# Patient Record
Sex: FEMALE | Race: WHITE | NOT HISPANIC OR LATINO | Employment: UNEMPLOYED | URBAN - METROPOLITAN AREA
[De-identification: names, ages, dates, MRNs, and addresses within clinical notes are randomized per-mention and may not be internally consistent; named-entity substitution may affect disease eponyms.]

---

## 2017-08-29 ENCOUNTER — APPOINTMENT (OUTPATIENT)
Dept: PREADMISSION TESTING | Facility: HOSPITAL | Age: 52
End: 2017-08-29

## 2017-08-29 VITALS — BODY MASS INDEX: 42.75 KG/M2 | WEIGHT: 266 LBS | HEIGHT: 66 IN

## 2017-08-29 LAB
ANION GAP SERPL CALCULATED.3IONS-SCNC: 2 MMOL/L (ref 3–11)
BUN BLD-MCNC: 9 MG/DL (ref 9–23)
BUN/CREAT SERPL: 12.9 (ref 7–25)
CALCIUM SPEC-SCNC: 9 MG/DL (ref 8.7–10.4)
CHLORIDE SERPL-SCNC: 107 MMOL/L (ref 99–109)
CO2 SERPL-SCNC: 33 MMOL/L (ref 20–31)
CREAT BLD-MCNC: 0.7 MG/DL (ref 0.6–1.3)
DEPRECATED RDW RBC AUTO: 48.1 FL (ref 37–54)
ERYTHROCYTE [DISTWIDTH] IN BLOOD BY AUTOMATED COUNT: 15.7 % (ref 11.3–14.5)
GFR SERPL CREATININE-BSD FRML MDRD: 88 ML/MIN/1.73
GLUCOSE BLD-MCNC: 108 MG/DL (ref 70–100)
HCT VFR BLD AUTO: 39.7 % (ref 34.5–44)
HGB BLD-MCNC: 12.6 G/DL (ref 11.5–15.5)
MCH RBC QN AUTO: 26.5 PG (ref 27–31)
MCHC RBC AUTO-ENTMCNC: 31.7 G/DL (ref 32–36)
MCV RBC AUTO: 83.4 FL (ref 80–99)
PLATELET # BLD AUTO: 281 10*3/MM3 (ref 150–450)
PMV BLD AUTO: 9.4 FL (ref 6–12)
POTASSIUM BLD-SCNC: 3.5 MMOL/L (ref 3.5–5.5)
RBC # BLD AUTO: 4.76 10*6/MM3 (ref 3.89–5.14)
SODIUM BLD-SCNC: 142 MMOL/L (ref 132–146)
WBC NRBC COR # BLD: 5.89 10*3/MM3 (ref 3.5–10.8)

## 2017-08-29 PROCEDURE — 93010 ELECTROCARDIOGRAM REPORT: CPT | Performed by: INTERNAL MEDICINE

## 2017-08-29 RX ORDER — SERTRALINE HYDROCHLORIDE 100 MG/1
150 TABLET, FILM COATED ORAL DAILY
COMMUNITY

## 2017-08-29 RX ORDER — ATORVASTATIN CALCIUM 10 MG/1
10 TABLET, FILM COATED ORAL DAILY
Status: ON HOLD | COMMUNITY
End: 2017-11-03 | Stop reason: ALTCHOICE

## 2017-08-29 RX ORDER — CETIRIZINE HYDROCHLORIDE 10 MG/1
5 TABLET ORAL DAILY
COMMUNITY

## 2017-08-29 RX ORDER — OMEPRAZOLE 20 MG/1
20 CAPSULE, DELAYED RELEASE ORAL DAILY
COMMUNITY
End: 2017-11-02

## 2017-08-29 RX ORDER — LEVOTHYROXINE SODIUM 0.12 MG/1
125 TABLET ORAL DAILY
COMMUNITY

## 2017-08-30 ENCOUNTER — ANESTHESIA (OUTPATIENT)
Dept: PERIOP | Facility: HOSPITAL | Age: 52
End: 2017-08-30

## 2017-08-30 ENCOUNTER — HOSPITAL ENCOUNTER (OUTPATIENT)
Facility: HOSPITAL | Age: 52
Discharge: HOME | End: 2017-08-30
Attending: OBSTETRICS & GYNECOLOGY | Admitting: OBSTETRICS & GYNECOLOGY

## 2017-08-30 ENCOUNTER — ANESTHESIA EVENT (OUTPATIENT)
Dept: PERIOP | Facility: HOSPITAL | Age: 52
End: 2017-08-30

## 2017-08-30 VITALS
OXYGEN SATURATION: 92 % | DIASTOLIC BLOOD PRESSURE: 86 MMHG | WEIGHT: 266 LBS | HEART RATE: 74 BPM | SYSTOLIC BLOOD PRESSURE: 126 MMHG | RESPIRATION RATE: 16 BRPM | TEMPERATURE: 98 F | HEIGHT: 66 IN | BODY MASS INDEX: 42.75 KG/M2

## 2017-08-30 DIAGNOSIS — N92.0 MENORRHAGIA: ICD-10-CM

## 2017-08-30 PROBLEM — N93.9 ABNORMAL UTERINE BLEEDING: Status: ACTIVE | Noted: 2017-08-30

## 2017-08-30 LAB
B-HCG UR QL: NEGATIVE
GLUCOSE BLDC GLUCOMTR-MCNC: 84 MG/DL (ref 70–130)
INTERNAL NEGATIVE CONTROL: NEGATIVE
INTERNAL POSITIVE CONTROL: POSITIVE
Lab: NORMAL

## 2017-08-30 PROCEDURE — 25010000003 CEFAZOLIN IN DEXTROSE 2-4 GM/100ML-% SOLUTION: Performed by: OBSTETRICS & GYNECOLOGY

## 2017-08-30 PROCEDURE — 25010000002 NEOSTIGMINE PER 0.5 MG: Performed by: NURSE ANESTHETIST, CERTIFIED REGISTERED

## 2017-08-30 PROCEDURE — 82962 GLUCOSE BLOOD TEST: CPT

## 2017-08-30 PROCEDURE — 25010000002 PROPOFOL 1000 MG/ML EMULSION: Performed by: NURSE ANESTHETIST, CERTIFIED REGISTERED

## 2017-08-30 PROCEDURE — 25010000002 ONDANSETRON PER 1 MG: Performed by: NURSE ANESTHETIST, CERTIFIED REGISTERED

## 2017-08-30 PROCEDURE — 88305 TISSUE EXAM BY PATHOLOGIST: CPT | Performed by: OBSTETRICS & GYNECOLOGY

## 2017-08-30 PROCEDURE — 94799 UNLISTED PULMONARY SVC/PX: CPT

## 2017-08-30 PROCEDURE — C1782 MORCELLATOR: HCPCS | Performed by: OBSTETRICS & GYNECOLOGY

## 2017-08-30 PROCEDURE — 25010000002 DEXAMETHASONE PER 1 MG: Performed by: NURSE ANESTHETIST, CERTIFIED REGISTERED

## 2017-08-30 PROCEDURE — 25010000002 KETOROLAC TROMETHAMINE PER 15 MG: Performed by: NURSE ANESTHETIST, CERTIFIED REGISTERED

## 2017-08-30 PROCEDURE — 25010000002 FENTANYL CITRATE (PF) 100 MCG/2ML SOLUTION: Performed by: NURSE ANESTHETIST, CERTIFIED REGISTERED

## 2017-08-30 PROCEDURE — 25010000002 PROPOFOL 10 MG/ML EMULSION: Performed by: NURSE ANESTHETIST, CERTIFIED REGISTERED

## 2017-08-30 RX ORDER — FAMOTIDINE 20 MG/1
20 TABLET, FILM COATED ORAL ONCE
Status: COMPLETED | OUTPATIENT
Start: 2017-08-30 | End: 2017-08-30

## 2017-08-30 RX ORDER — PROMETHAZINE HYDROCHLORIDE 25 MG/1
25 TABLET ORAL ONCE AS NEEDED
Status: DISCONTINUED | OUTPATIENT
Start: 2017-08-30 | End: 2017-08-30 | Stop reason: HOSPADM

## 2017-08-30 RX ORDER — FENTANYL CITRATE 50 UG/ML
50 INJECTION, SOLUTION INTRAMUSCULAR; INTRAVENOUS
Status: DISCONTINUED | OUTPATIENT
Start: 2017-08-30 | End: 2017-08-30 | Stop reason: HOSPADM

## 2017-08-30 RX ORDER — GLYCOPYRROLATE 0.2 MG/ML
INJECTION INTRAMUSCULAR; INTRAVENOUS AS NEEDED
Status: DISCONTINUED | OUTPATIENT
Start: 2017-08-30 | End: 2017-08-30 | Stop reason: SURG

## 2017-08-30 RX ORDER — ONDANSETRON 2 MG/ML
4 INJECTION INTRAMUSCULAR; INTRAVENOUS ONCE AS NEEDED
Status: DISCONTINUED | OUTPATIENT
Start: 2017-08-30 | End: 2017-08-30 | Stop reason: HOSPADM

## 2017-08-30 RX ORDER — PROMETHAZINE HYDROCHLORIDE 25 MG/ML
6.25 INJECTION, SOLUTION INTRAMUSCULAR; INTRAVENOUS ONCE AS NEEDED
Status: DISCONTINUED | OUTPATIENT
Start: 2017-08-30 | End: 2017-08-30 | Stop reason: HOSPADM

## 2017-08-30 RX ORDER — PROMETHAZINE HYDROCHLORIDE 25 MG/1
25 SUPPOSITORY RECTAL ONCE AS NEEDED
Status: DISCONTINUED | OUTPATIENT
Start: 2017-08-30 | End: 2017-08-30 | Stop reason: HOSPADM

## 2017-08-30 RX ORDER — LIDOCAINE HYDROCHLORIDE 40 MG/ML
SOLUTION TOPICAL AS NEEDED
Status: DISCONTINUED | OUTPATIENT
Start: 2017-08-30 | End: 2017-08-30 | Stop reason: SURG

## 2017-08-30 RX ORDER — HYDROMORPHONE HYDROCHLORIDE 1 MG/ML
0.5 INJECTION, SOLUTION INTRAMUSCULAR; INTRAVENOUS; SUBCUTANEOUS
Status: DISCONTINUED | OUTPATIENT
Start: 2017-08-30 | End: 2017-08-30 | Stop reason: HOSPADM

## 2017-08-30 RX ORDER — SODIUM CHLORIDE, SODIUM LACTATE, POTASSIUM CHLORIDE, CALCIUM CHLORIDE 600; 310; 30; 20 MG/100ML; MG/100ML; MG/100ML; MG/100ML
9 INJECTION, SOLUTION INTRAVENOUS CONTINUOUS
Status: DISCONTINUED | OUTPATIENT
Start: 2017-08-30 | End: 2017-08-30 | Stop reason: HOSPADM

## 2017-08-30 RX ORDER — FENTANYL CITRATE 50 UG/ML
INJECTION, SOLUTION INTRAMUSCULAR; INTRAVENOUS AS NEEDED
Status: DISCONTINUED | OUTPATIENT
Start: 2017-08-30 | End: 2017-08-30 | Stop reason: SURG

## 2017-08-30 RX ORDER — SCOLOPAMINE TRANSDERMAL SYSTEM 1 MG/1
1 PATCH, EXTENDED RELEASE TRANSDERMAL ONCE
Status: DISCONTINUED | OUTPATIENT
Start: 2017-08-30 | End: 2017-08-30 | Stop reason: HOSPADM

## 2017-08-30 RX ORDER — LIDOCAINE HYDROCHLORIDE 10 MG/ML
0.5 INJECTION, SOLUTION EPIDURAL; INFILTRATION; INTRACAUDAL; PERINEURAL ONCE AS NEEDED
Status: DISCONTINUED | OUTPATIENT
Start: 2017-08-30 | End: 2017-08-30 | Stop reason: HOSPADM

## 2017-08-30 RX ORDER — FAMOTIDINE 10 MG/ML
20 INJECTION, SOLUTION INTRAVENOUS ONCE
Status: DISCONTINUED | OUTPATIENT
Start: 2017-08-30 | End: 2017-08-30

## 2017-08-30 RX ORDER — CEFAZOLIN SODIUM 2 G/100ML
2 INJECTION, SOLUTION INTRAVENOUS ONCE
Status: DISCONTINUED | OUTPATIENT
Start: 2017-08-30 | End: 2017-08-30 | Stop reason: HOSPADM

## 2017-08-30 RX ORDER — HYDROCODONE BITARTRATE AND ACETAMINOPHEN 5; 325 MG/1; MG/1
1 TABLET ORAL ONCE AS NEEDED
Status: DISCONTINUED | OUTPATIENT
Start: 2017-08-30 | End: 2017-08-30 | Stop reason: HOSPADM

## 2017-08-30 RX ORDER — SODIUM CHLORIDE, SODIUM LACTATE, POTASSIUM CHLORIDE, CALCIUM CHLORIDE 600; 310; 30; 20 MG/100ML; MG/100ML; MG/100ML; MG/100ML
9 INJECTION, SOLUTION INTRAVENOUS CONTINUOUS
Status: DISCONTINUED | OUTPATIENT
Start: 2017-08-30 | End: 2017-08-30

## 2017-08-30 RX ORDER — ATRACURIUM BESYLATE 10 MG/ML
INJECTION, SOLUTION INTRAVENOUS AS NEEDED
Status: DISCONTINUED | OUTPATIENT
Start: 2017-08-30 | End: 2017-08-30 | Stop reason: SURG

## 2017-08-30 RX ORDER — SODIUM CHLORIDE 0.9 % (FLUSH) 0.9 %
1-10 SYRINGE (ML) INJECTION AS NEEDED
Status: DISCONTINUED | OUTPATIENT
Start: 2017-08-30 | End: 2017-08-30 | Stop reason: HOSPADM

## 2017-08-30 RX ORDER — LIDOCAINE HYDROCHLORIDE 10 MG/ML
0.2 INJECTION, SOLUTION INFILTRATION; PERINEURAL ONCE
Status: COMPLETED | OUTPATIENT
Start: 2017-08-30 | End: 2017-08-30

## 2017-08-30 RX ORDER — CEFAZOLIN SODIUM 2 G/100ML
2 INJECTION, SOLUTION INTRAVENOUS ONCE
Status: COMPLETED | OUTPATIENT
Start: 2017-08-30 | End: 2017-08-30

## 2017-08-30 RX ORDER — DEXAMETHASONE SODIUM PHOSPHATE 4 MG/ML
INJECTION, SOLUTION INTRA-ARTICULAR; INTRALESIONAL; INTRAMUSCULAR; INTRAVENOUS; SOFT TISSUE AS NEEDED
Status: DISCONTINUED | OUTPATIENT
Start: 2017-08-30 | End: 2017-08-30 | Stop reason: SURG

## 2017-08-30 RX ORDER — FAMOTIDINE 20 MG/1
20 TABLET, FILM COATED ORAL ONCE
Status: DISCONTINUED | OUTPATIENT
Start: 2017-08-30 | End: 2017-08-30

## 2017-08-30 RX ORDER — ONDANSETRON 2 MG/ML
INJECTION INTRAMUSCULAR; INTRAVENOUS AS NEEDED
Status: DISCONTINUED | OUTPATIENT
Start: 2017-08-30 | End: 2017-08-30 | Stop reason: SURG

## 2017-08-30 RX ORDER — LIDOCAINE HYDROCHLORIDE 10 MG/ML
INJECTION, SOLUTION INFILTRATION; PERINEURAL AS NEEDED
Status: DISCONTINUED | OUTPATIENT
Start: 2017-08-30 | End: 2017-08-30 | Stop reason: SURG

## 2017-08-30 RX ORDER — PROPOFOL 10 MG/ML
VIAL (ML) INTRAVENOUS AS NEEDED
Status: DISCONTINUED | OUTPATIENT
Start: 2017-08-30 | End: 2017-08-30 | Stop reason: SURG

## 2017-08-30 RX ORDER — KETOROLAC TROMETHAMINE 30 MG/ML
INJECTION, SOLUTION INTRAMUSCULAR; INTRAVENOUS AS NEEDED
Status: DISCONTINUED | OUTPATIENT
Start: 2017-08-30 | End: 2017-08-30 | Stop reason: SURG

## 2017-08-30 RX ADMIN — ROBINUL 0.4 MG: 0.2 INJECTION INTRAMUSCULAR; INTRAVENOUS at 10:50

## 2017-08-30 RX ADMIN — DEXAMETHASONE SODIUM PHOSPHATE 8 MG: 4 INJECTION, SOLUTION INTRAMUSCULAR; INTRAVENOUS at 10:30

## 2017-08-30 RX ADMIN — SODIUM CHLORIDE, POTASSIUM CHLORIDE, SODIUM LACTATE AND CALCIUM CHLORIDE 9 ML/HR: 600; 310; 30; 20 INJECTION, SOLUTION INTRAVENOUS at 08:55

## 2017-08-30 RX ADMIN — ATRACURIUM BESYLATE 30 MG: 10 INJECTION, SOLUTION INTRAVENOUS at 10:23

## 2017-08-30 RX ADMIN — ONDANSETRON 4 MG: 2 INJECTION INTRAMUSCULAR; INTRAVENOUS at 10:30

## 2017-08-30 RX ADMIN — Medication 2.5 MG: at 10:50

## 2017-08-30 RX ADMIN — PROPOFOL 200 MG: 10 INJECTION, EMULSION INTRAVENOUS at 10:22

## 2017-08-30 RX ADMIN — FAMOTIDINE 20 MG: 20 TABLET ORAL at 09:13

## 2017-08-30 RX ADMIN — LIDOCAINE HYDROCHLORIDE 50 MG: 10 INJECTION, SOLUTION INFILTRATION; PERINEURAL at 10:22

## 2017-08-30 RX ADMIN — KETOROLAC TROMETHAMINE 15 MG: 30 INJECTION, SOLUTION INTRAMUSCULAR at 10:47

## 2017-08-30 RX ADMIN — SCOPALAMINE 1 PATCH: 1 PATCH, EXTENDED RELEASE TRANSDERMAL at 09:00

## 2017-08-30 RX ADMIN — LIDOCAINE HYDROCHLORIDE 1 EACH: 40 SOLUTION TOPICAL at 10:25

## 2017-08-30 RX ADMIN — FENTANYL CITRATE 50 MCG: 50 INJECTION, SOLUTION INTRAMUSCULAR; INTRAVENOUS at 10:22

## 2017-08-30 RX ADMIN — CEFAZOLIN SODIUM 2 G: 2 INJECTION, SOLUTION INTRAVENOUS at 10:16

## 2017-08-30 RX ADMIN — LIDOCAINE HYDROCHLORIDE 0.2 ML: 10 INJECTION, SOLUTION EPIDURAL; INFILTRATION; INTRACAUDAL; PERINEURAL at 08:55

## 2017-08-30 RX ADMIN — FENTANYL CITRATE 50 MCG: 50 INJECTION, SOLUTION INTRAMUSCULAR; INTRAVENOUS at 10:20

## 2017-08-30 RX ADMIN — PROPOFOL 25 MCG/KG/MIN: 10 INJECTION, EMULSION INTRAVENOUS at 10:30

## 2017-08-31 LAB
CYTO UR: NORMAL
LAB AP CASE REPORT: NORMAL
LAB AP CLINICAL INFORMATION: NORMAL
Lab: NORMAL
PATH REPORT.FINAL DX SPEC: NORMAL
PATH REPORT.GROSS SPEC: NORMAL

## 2017-09-26 ENCOUNTER — PREP FOR SURGERY (OUTPATIENT)
Dept: GYNECOLOGIC ONCOLOGY | Facility: CLINIC | Age: 52
End: 2017-09-26

## 2017-09-26 ENCOUNTER — OFFICE VISIT (OUTPATIENT)
Dept: GYNECOLOGIC ONCOLOGY | Facility: CLINIC | Age: 52
End: 2017-09-26

## 2017-09-26 VITALS
HEIGHT: 66 IN | WEIGHT: 260 LBS | SYSTOLIC BLOOD PRESSURE: 142 MMHG | OXYGEN SATURATION: 98 % | TEMPERATURE: 98 F | HEART RATE: 85 BPM | BODY MASS INDEX: 41.78 KG/M2 | DIASTOLIC BLOOD PRESSURE: 83 MMHG | RESPIRATION RATE: 14 BRPM

## 2017-09-26 DIAGNOSIS — N85.02 COMPLEX ATYPICAL ENDOMETRIAL HYPERPLASIA: Primary | ICD-10-CM

## 2017-09-26 PROCEDURE — 99244 OFF/OP CNSLTJ NEW/EST MOD 40: CPT | Performed by: OBSTETRICS & GYNECOLOGY

## 2017-09-28 RX ORDER — CELECOXIB 100 MG/1
200 CAPSULE ORAL ONCE
Status: CANCELLED | OUTPATIENT
Start: 2017-09-28 | End: 2017-09-28

## 2017-09-28 RX ORDER — ACETAMINOPHEN 325 MG/1
650 TABLET ORAL ONCE
Status: CANCELLED | OUTPATIENT
Start: 2017-09-28 | End: 2017-09-28

## 2017-09-28 RX ORDER — PREGABALIN 25 MG/1
150 CAPSULE ORAL ONCE
Status: CANCELLED | OUTPATIENT
Start: 2017-09-28 | End: 2017-09-28

## 2017-09-28 RX ORDER — SODIUM CHLORIDE, SODIUM LACTATE, POTASSIUM CHLORIDE, CALCIUM CHLORIDE 600; 310; 30; 20 MG/100ML; MG/100ML; MG/100ML; MG/100ML
100 INJECTION, SOLUTION INTRAVENOUS CONTINUOUS
Status: CANCELLED | OUTPATIENT
Start: 2017-09-28

## 2017-10-02 ENCOUNTER — TELEPHONE (OUTPATIENT)
Dept: GYNECOLOGIC ONCOLOGY | Facility: CLINIC | Age: 52
End: 2017-10-02

## 2017-10-02 NOTE — TELEPHONE ENCOUNTER
Phoned pt to schedule date for surgery with Dr. Faulkner, left message to call.  Returned pt's call from her returning mine and leaving a message.  Left message to call.

## 2017-10-03 ENCOUNTER — PREP FOR SURGERY (OUTPATIENT)
Dept: GYNECOLOGIC ONCOLOGY | Facility: CLINIC | Age: 52
End: 2017-10-03

## 2017-10-03 NOTE — PATIENT INSTRUCTIONS
Gynecologic Oncology  Inpatient Pre-op Patient Education  *See checked boxes for your instructions*    Patient Name:  Maria Dolores Sandoval  [id number]  1965    Surgeon:  Dr. Faulkner    Appointment  [x]  1. Your surgery has been scheduled on 11-3-17. You will need to be at the second floor surgery registration of the [facility] hospital on that day at 6:00 AM.   [x] 2.  You have a pre-admission testing (PAT) appointment for labs and possibly chest xray and EKG, on 11-2-17 at 12:30 PM.  You will need to be at hospital registration on the first floor, 10 minutes before that time.     [x] 3.  The hospital registration department is located in the long hallway between the [address] buildings.     The Day(s) Before Surgery  [x] 1. On 11-2-17, the day prior to surgery, eat lightly.  No solid food after midnight on 11-2-17, including NO MILK, CREAM, OR ORANGE JUICE.  You may have sips of clear fluids up until two-three hours prior to your arrival to the hospital on the morning of surgery.     [] 2.  You may need to use a stool softener, the day prior to surgery to help with existing constipation and to clean out your bowels.  You can purchase Miralax over-the-counter at the pharmacy and follow the directions on the back.  (Do not do this step unless the box is checked).   [x] 3.  Do not take vitamins or full dose aspirin one week before surgery.  If you normally take a blood thinning medication such as Warfarin, Eliquis, or Xarelto, we will give you specific instructions regarding these medications and we may need to talk with your other doctors.   [x] 4.  On the morning of your surgery, you may likely take your routine prescription medications with a sip of water as reviewed with you by your surgeon.  Bring your home medications with you to the hospital as we may need to reference these.  In particular be sure to bring any inhalers.     Post-surgery Instructions  [x] 1.  The length of stay for your type of surgery is typically  one hospital night, however it is also possible that you could be discharged home in the evening on the same day depending on the nature of your surgery.  All rooms are private, so family member may stay with you.     [x] 2.  Do not take your own home prescription medication while you are in the hospital unless otherwise instructed.  These will be provided to you.         Comments:

## 2017-11-02 ENCOUNTER — HOSPITAL ENCOUNTER (OUTPATIENT)
Dept: GENERAL RADIOLOGY | Facility: HOSPITAL | Age: 52
Discharge: HOME | End: 2017-11-02
Admitting: OBSTETRICS & GYNECOLOGY

## 2017-11-02 ENCOUNTER — APPOINTMENT (OUTPATIENT)
Dept: PREADMISSION TESTING | Facility: HOSPITAL | Age: 52
End: 2017-11-02

## 2017-11-02 ENCOUNTER — ANESTHESIA EVENT (OUTPATIENT)
Dept: PERIOP | Facility: HOSPITAL | Age: 52
End: 2017-11-02

## 2017-11-02 VITALS — WEIGHT: 263.23 LBS | BODY MASS INDEX: 42.3 KG/M2 | HEIGHT: 66 IN

## 2017-11-02 DIAGNOSIS — N85.02 COMPLEX ATYPICAL ENDOMETRIAL HYPERPLASIA: ICD-10-CM

## 2017-11-02 LAB
ABO GROUP BLD: NORMAL
ALBUMIN SERPL-MCNC: 4.1 G/DL (ref 3.2–4.8)
ALBUMIN/GLOB SERPL: 1.6 G/DL (ref 1.5–2.5)
ALP SERPL-CCNC: 108 U/L (ref 25–100)
ALT SERPL W P-5'-P-CCNC: 119 U/L (ref 7–40)
ANION GAP SERPL CALCULATED.3IONS-SCNC: 9 MMOL/L (ref 3–11)
AST SERPL-CCNC: 84 U/L (ref 0–33)
BASOPHILS # BLD AUTO: 0.04 10*3/MM3 (ref 0–0.2)
BASOPHILS NFR BLD AUTO: 0.5 % (ref 0–1)
BILIRUB SERPL-MCNC: 0.4 MG/DL (ref 0.3–1.2)
BLD GP AB SCN SERPL QL: NEGATIVE
BUN BLD-MCNC: 9 MG/DL (ref 9–23)
BUN/CREAT SERPL: 11.3 (ref 7–25)
CALCIUM SPEC-SCNC: 8.7 MG/DL (ref 8.7–10.4)
CHLORIDE SERPL-SCNC: 105 MMOL/L (ref 99–109)
CO2 SERPL-SCNC: 26 MMOL/L (ref 20–31)
CREAT BLD-MCNC: 0.8 MG/DL (ref 0.6–1.3)
DEPRECATED RDW RBC AUTO: 47.4 FL (ref 37–54)
EOSINOPHIL # BLD AUTO: 0.21 10*3/MM3 (ref 0–0.3)
EOSINOPHIL NFR BLD AUTO: 2.8 % (ref 0–3)
ERYTHROCYTE [DISTWIDTH] IN BLOOD BY AUTOMATED COUNT: 15.8 % (ref 11.3–14.5)
GFR SERPL CREATININE-BSD FRML MDRD: 75 ML/MIN/1.73
GLOBULIN UR ELPH-MCNC: 2.5 GM/DL
GLUCOSE BLD-MCNC: 107 MG/DL (ref 70–100)
HBA1C MFR BLD: 6 % (ref 4.8–5.6)
HCG INTACT+B SERPL-ACNC: <5 MIU/ML
HCT VFR BLD AUTO: 40.1 % (ref 34.5–44)
HGB BLD-MCNC: 12.7 G/DL (ref 11.5–15.5)
IMM GRANULOCYTES # BLD: 0.02 10*3/MM3 (ref 0–0.03)
IMM GRANULOCYTES NFR BLD: 0.3 % (ref 0–0.6)
LYMPHOCYTES # BLD AUTO: 1.63 10*3/MM3 (ref 0.6–4.8)
LYMPHOCYTES NFR BLD AUTO: 22.1 % (ref 24–44)
MCH RBC QN AUTO: 25.9 PG (ref 27–31)
MCHC RBC AUTO-ENTMCNC: 31.7 G/DL (ref 32–36)
MCV RBC AUTO: 81.7 FL (ref 80–99)
MONOCYTES # BLD AUTO: 0.74 10*3/MM3 (ref 0–1)
MONOCYTES NFR BLD AUTO: 10 % (ref 0–12)
NEUTROPHILS # BLD AUTO: 4.74 10*3/MM3 (ref 1.5–8.3)
NEUTROPHILS NFR BLD AUTO: 64.3 % (ref 41–71)
PLATELET # BLD AUTO: 311 10*3/MM3 (ref 150–450)
PMV BLD AUTO: 9.7 FL (ref 6–12)
POTASSIUM BLD-SCNC: 3.7 MMOL/L (ref 3.5–5.5)
PROT SERPL-MCNC: 6.6 G/DL (ref 5.7–8.2)
RBC # BLD AUTO: 4.91 10*6/MM3 (ref 3.89–5.14)
RH BLD: POSITIVE
SODIUM BLD-SCNC: 140 MMOL/L (ref 132–146)
WBC NRBC COR # BLD: 7.38 10*3/MM3 (ref 3.5–10.8)

## 2017-11-02 NOTE — DISCHARGE INSTRUCTIONS
The following information and instructions were given:    NPO after MN except sips of water with routine prescribed medication (except blood thinner, diabetes, or weight reducing medication) unless otherwise instructed by your physician.  Do not eat, drink, smoke or chew gum after MN the night before surgery. This also includes no mints.    DO NOT shave, wear makeup or dark nail polish.    Remove all jewelry (advised to go to jeweler if unable to remove).    Leave anything you consider valuable at home.    Leave your suitcase in the car until after your surgery.    Bring the following with you (if applicable)   -picture ID and insurance cards   -Co-pay/deductible required by insurance   -Medications in the original bottles (not a list) including all over-the-counter  medications if not brought to PAT   -Copy of advance directive, living will or power of  documents if not  brought to PAT   -CPAP or BIPAP mask and tubing (do not bring machine)   -Skin prep instructions sheet   -PAT Pass   Education booklet, brochure, handout or binder given to patient.    Pain Control After Surgery handout given to patient.    Respirex use (handout given to patient) and pneumonia prevention.    Signs and Symptoms of infection.    DVT Prevention stressing the importance of ambulation.    Patient to apply Chlorhexadine wipes to surgical area (as instructed) the night before procedure and the AM of procedure.      Hysterectomy book given

## 2017-11-03 ENCOUNTER — ANESTHESIA (OUTPATIENT)
Dept: PERIOP | Facility: HOSPITAL | Age: 52
End: 2017-11-03

## 2017-11-03 ENCOUNTER — HOSPITAL ENCOUNTER (OUTPATIENT)
Facility: HOSPITAL | Age: 52
Setting detail: OBSERVATION
Discharge: HOME | End: 2017-11-04
Attending: OBSTETRICS & GYNECOLOGY | Admitting: OBSTETRICS & GYNECOLOGY

## 2017-11-03 DIAGNOSIS — N85.02 COMPLEX ATYPICAL ENDOMETRIAL HYPERPLASIA: ICD-10-CM

## 2017-11-03 LAB
ABO GROUP BLD: NORMAL
B-HCG UR QL: NEGATIVE
GLUCOSE BLDC GLUCOMTR-MCNC: 140 MG/DL (ref 70–130)
GLUCOSE BLDC GLUCOMTR-MCNC: 95 MG/DL (ref 70–130)
INTERNAL NEGATIVE CONTROL: NORMAL
INTERNAL POSITIVE CONTROL: REACTIVE
Lab: NORMAL
RH BLD: POSITIVE

## 2017-11-03 PROCEDURE — 88331 PATH CONSLTJ SURG 1 BLK 1SPC: CPT | Performed by: OBSTETRICS & GYNECOLOGY

## 2017-11-03 PROCEDURE — G0378 HOSPITAL OBSERVATION PER HR: HCPCS

## 2017-11-03 PROCEDURE — 25010000002 NEOSTIGMINE 10 MG/10ML SOLUTION: Performed by: NURSE ANESTHETIST, CERTIFIED REGISTERED

## 2017-11-03 PROCEDURE — 25010000002 ONDANSETRON PER 1 MG: Performed by: NURSE ANESTHETIST, CERTIFIED REGISTERED

## 2017-11-03 PROCEDURE — 82962 GLUCOSE BLOOD TEST: CPT

## 2017-11-03 PROCEDURE — 25010000002 PROPOFOL 10 MG/ML EMULSION: Performed by: NURSE ANESTHETIST, CERTIFIED REGISTERED

## 2017-11-03 PROCEDURE — 88307 TISSUE EXAM BY PATHOLOGIST: CPT | Performed by: OBSTETRICS & GYNECOLOGY

## 2017-11-03 PROCEDURE — 25010000002 FENTANYL CITRATE (PF) 100 MCG/2ML SOLUTION: Performed by: NURSE ANESTHETIST, CERTIFIED REGISTERED

## 2017-11-03 PROCEDURE — 25010000002 PROPOFOL 1000 MG/ML EMULSION: Performed by: NURSE ANESTHETIST, CERTIFIED REGISTERED

## 2017-11-03 PROCEDURE — 25010000002 DEXAMETHASONE PER 1 MG: Performed by: NURSE ANESTHETIST, CERTIFIED REGISTERED

## 2017-11-03 PROCEDURE — 86901 BLOOD TYPING SEROLOGIC RH(D): CPT

## 2017-11-03 PROCEDURE — 25010000002 CEFOXITIN PER 1 G: Performed by: OBSTETRICS & GYNECOLOGY

## 2017-11-03 PROCEDURE — 94799 UNLISTED PULMONARY SVC/PX: CPT

## 2017-11-03 PROCEDURE — 58571 TLH W/T/O 250 G OR LESS: CPT | Performed by: OBSTETRICS & GYNECOLOGY

## 2017-11-03 PROCEDURE — 86900 BLOOD TYPING SEROLOGIC ABO: CPT

## 2017-11-03 PROCEDURE — 94660 CPAP INITIATION&MGMT: CPT

## 2017-11-03 RX ORDER — DOCUSATE SODIUM 100 MG/1
100 CAPSULE, LIQUID FILLED ORAL 2 TIMES DAILY PRN
Status: DISCONTINUED | OUTPATIENT
Start: 2017-11-03 | End: 2017-11-04 | Stop reason: HOSPADM

## 2017-11-03 RX ORDER — PROMETHAZINE HYDROCHLORIDE 12.5 MG/1
12.5 TABLET ORAL EVERY 6 HOURS PRN
Status: DISCONTINUED | OUTPATIENT
Start: 2017-11-03 | End: 2017-11-04 | Stop reason: HOSPADM

## 2017-11-03 RX ORDER — FAMOTIDINE 10 MG/ML
20 INJECTION, SOLUTION INTRAVENOUS ONCE
Status: DISCONTINUED | OUTPATIENT
Start: 2017-11-03 | End: 2017-11-03 | Stop reason: HOSPADM

## 2017-11-03 RX ORDER — ONDANSETRON 4 MG/1
4 TABLET, FILM COATED ORAL EVERY 6 HOURS PRN
Status: DISCONTINUED | OUTPATIENT
Start: 2017-11-03 | End: 2017-11-04 | Stop reason: HOSPADM

## 2017-11-03 RX ORDER — SCOLOPAMINE TRANSDERMAL SYSTEM 1 MG/1
PATCH, EXTENDED RELEASE TRANSDERMAL AS NEEDED
Status: DISCONTINUED | OUTPATIENT
Start: 2017-11-03 | End: 2017-11-03 | Stop reason: SURG

## 2017-11-03 RX ORDER — LEVOTHYROXINE SODIUM 0.12 MG/1
125 TABLET ORAL
Status: DISCONTINUED | OUTPATIENT
Start: 2017-11-04 | End: 2017-11-04 | Stop reason: HOSPADM

## 2017-11-03 RX ORDER — PROMETHAZINE HYDROCHLORIDE 12.5 MG/1
12.5 SUPPOSITORY RECTAL EVERY 6 HOURS PRN
Status: DISCONTINUED | OUTPATIENT
Start: 2017-11-03 | End: 2017-11-04 | Stop reason: HOSPADM

## 2017-11-03 RX ORDER — ACETAMINOPHEN 325 MG/1
650 TABLET ORAL ONCE
Status: DISCONTINUED | OUTPATIENT
Start: 2017-11-03 | End: 2017-11-03 | Stop reason: HOSPADM

## 2017-11-03 RX ORDER — LIDOCAINE HYDROCHLORIDE 10 MG/ML
0.5 INJECTION, SOLUTION EPIDURAL; INFILTRATION; INTRACAUDAL; PERINEURAL ONCE AS NEEDED
Status: COMPLETED | OUTPATIENT
Start: 2017-11-03 | End: 2017-11-03

## 2017-11-03 RX ORDER — CALCIUM CARBONATE 200(500)MG
1 TABLET,CHEWABLE ORAL 3 TIMES DAILY PRN
Status: DISCONTINUED | OUTPATIENT
Start: 2017-11-03 | End: 2017-11-04 | Stop reason: HOSPADM

## 2017-11-03 RX ORDER — PROMETHAZINE HYDROCHLORIDE 25 MG/ML
12.5 INJECTION, SOLUTION INTRAMUSCULAR; INTRAVENOUS EVERY 6 HOURS PRN
Status: DISCONTINUED | OUTPATIENT
Start: 2017-11-03 | End: 2017-11-04 | Stop reason: HOSPADM

## 2017-11-03 RX ORDER — DEXAMETHASONE SODIUM PHOSPHATE 4 MG/ML
INJECTION, SOLUTION INTRA-ARTICULAR; INTRALESIONAL; INTRAMUSCULAR; INTRAVENOUS; SOFT TISSUE AS NEEDED
Status: DISCONTINUED | OUTPATIENT
Start: 2017-11-03 | End: 2017-11-03 | Stop reason: SURG

## 2017-11-03 RX ORDER — ONDANSETRON 2 MG/ML
INJECTION INTRAMUSCULAR; INTRAVENOUS AS NEEDED
Status: DISCONTINUED | OUTPATIENT
Start: 2017-11-03 | End: 2017-11-03 | Stop reason: SURG

## 2017-11-03 RX ORDER — SODIUM CHLORIDE, SODIUM LACTATE, POTASSIUM CHLORIDE, CALCIUM CHLORIDE 600; 310; 30; 20 MG/100ML; MG/100ML; MG/100ML; MG/100ML
100 INJECTION, SOLUTION INTRAVENOUS CONTINUOUS
Status: DISCONTINUED | OUTPATIENT
Start: 2017-11-03 | End: 2017-11-03

## 2017-11-03 RX ORDER — OXYCODONE HYDROCHLORIDE 5 MG/1
10 TABLET ORAL EVERY 4 HOURS PRN
Status: DISCONTINUED | OUTPATIENT
Start: 2017-11-03 | End: 2017-11-04 | Stop reason: HOSPADM

## 2017-11-03 RX ORDER — FAMOTIDINE 20 MG/1
20 TABLET, FILM COATED ORAL 2 TIMES DAILY
Status: DISCONTINUED | OUTPATIENT
Start: 2017-11-03 | End: 2017-11-04 | Stop reason: HOSPADM

## 2017-11-03 RX ORDER — PROPOFOL 10 MG/ML
VIAL (ML) INTRAVENOUS AS NEEDED
Status: DISCONTINUED | OUTPATIENT
Start: 2017-11-03 | End: 2017-11-03 | Stop reason: SURG

## 2017-11-03 RX ORDER — LIDOCAINE HYDROCHLORIDE 10 MG/ML
INJECTION, SOLUTION INFILTRATION; PERINEURAL AS NEEDED
Status: DISCONTINUED | OUTPATIENT
Start: 2017-11-03 | End: 2017-11-03 | Stop reason: SURG

## 2017-11-03 RX ORDER — FAMOTIDINE 20 MG/1
20 TABLET, FILM COATED ORAL ONCE
Status: COMPLETED | OUTPATIENT
Start: 2017-11-03 | End: 2017-11-03

## 2017-11-03 RX ORDER — SODIUM CHLORIDE, SODIUM LACTATE, POTASSIUM CHLORIDE, CALCIUM CHLORIDE 600; 310; 30; 20 MG/100ML; MG/100ML; MG/100ML; MG/100ML
9 INJECTION, SOLUTION INTRAVENOUS CONTINUOUS
Status: DISCONTINUED | OUTPATIENT
Start: 2017-11-03 | End: 2017-11-03

## 2017-11-03 RX ORDER — PROMETHAZINE HYDROCHLORIDE 25 MG/ML
6.25 INJECTION, SOLUTION INTRAMUSCULAR; INTRAVENOUS ONCE AS NEEDED
Status: DISCONTINUED | OUTPATIENT
Start: 2017-11-03 | End: 2017-11-03 | Stop reason: HOSPADM

## 2017-11-03 RX ORDER — PROMETHAZINE HYDROCHLORIDE 25 MG/1
25 TABLET ORAL ONCE AS NEEDED
Status: DISCONTINUED | OUTPATIENT
Start: 2017-11-03 | End: 2017-11-03 | Stop reason: HOSPADM

## 2017-11-03 RX ORDER — BUPIVACAINE HYDROCHLORIDE AND EPINEPHRINE 5; 5 MG/ML; UG/ML
INJECTION, SOLUTION PERINEURAL AS NEEDED
Status: DISCONTINUED | OUTPATIENT
Start: 2017-11-03 | End: 2017-11-03 | Stop reason: HOSPADM

## 2017-11-03 RX ORDER — FENTANYL CITRATE 50 UG/ML
50 INJECTION, SOLUTION INTRAMUSCULAR; INTRAVENOUS
Status: DISCONTINUED | OUTPATIENT
Start: 2017-11-03 | End: 2017-11-03 | Stop reason: HOSPADM

## 2017-11-03 RX ORDER — CETIRIZINE HYDROCHLORIDE 10 MG/1
5 TABLET ORAL DAILY
Status: DISCONTINUED | OUTPATIENT
Start: 2017-11-04 | End: 2017-11-04 | Stop reason: HOSPADM

## 2017-11-03 RX ORDER — GLYCOPYRROLATE 0.2 MG/ML
INJECTION INTRAMUSCULAR; INTRAVENOUS AS NEEDED
Status: DISCONTINUED | OUTPATIENT
Start: 2017-11-03 | End: 2017-11-03 | Stop reason: SURG

## 2017-11-03 RX ORDER — SODIUM CHLORIDE 9 MG/ML
INJECTION, SOLUTION INTRAVENOUS AS NEEDED
Status: DISCONTINUED | OUTPATIENT
Start: 2017-11-03 | End: 2017-11-03 | Stop reason: HOSPADM

## 2017-11-03 RX ORDER — NEOSTIGMINE METHYLSULFATE 1 MG/ML
INJECTION, SOLUTION INTRAVENOUS AS NEEDED
Status: DISCONTINUED | OUTPATIENT
Start: 2017-11-03 | End: 2017-11-03 | Stop reason: SURG

## 2017-11-03 RX ORDER — ONDANSETRON 2 MG/ML
4 INJECTION INTRAMUSCULAR; INTRAVENOUS EVERY 6 HOURS PRN
Status: DISCONTINUED | OUTPATIENT
Start: 2017-11-03 | End: 2017-11-04 | Stop reason: HOSPADM

## 2017-11-03 RX ORDER — MAGNESIUM HYDROXIDE 1200 MG/15ML
LIQUID ORAL AS NEEDED
Status: DISCONTINUED | OUTPATIENT
Start: 2017-11-03 | End: 2017-11-03 | Stop reason: HOSPADM

## 2017-11-03 RX ORDER — HYDROMORPHONE HYDROCHLORIDE 1 MG/ML
0.5 INJECTION, SOLUTION INTRAMUSCULAR; INTRAVENOUS; SUBCUTANEOUS
Status: DISCONTINUED | OUTPATIENT
Start: 2017-11-03 | End: 2017-11-03 | Stop reason: HOSPADM

## 2017-11-03 RX ORDER — SODIUM CHLORIDE, SODIUM LACTATE, POTASSIUM CHLORIDE, CALCIUM CHLORIDE 600; 310; 30; 20 MG/100ML; MG/100ML; MG/100ML; MG/100ML
75 INJECTION, SOLUTION INTRAVENOUS CONTINUOUS
Status: DISCONTINUED | OUTPATIENT
Start: 2017-11-03 | End: 2017-11-04 | Stop reason: HOSPADM

## 2017-11-03 RX ORDER — PROMETHAZINE HYDROCHLORIDE 25 MG/1
25 SUPPOSITORY RECTAL ONCE AS NEEDED
Status: DISCONTINUED | OUTPATIENT
Start: 2017-11-03 | End: 2017-11-03 | Stop reason: HOSPADM

## 2017-11-03 RX ORDER — SODIUM CHLORIDE 0.9 % (FLUSH) 0.9 %
1-10 SYRINGE (ML) INJECTION AS NEEDED
Status: DISCONTINUED | OUTPATIENT
Start: 2017-11-03 | End: 2017-11-03 | Stop reason: HOSPADM

## 2017-11-03 RX ORDER — FENTANYL CITRATE 50 UG/ML
INJECTION, SOLUTION INTRAMUSCULAR; INTRAVENOUS AS NEEDED
Status: DISCONTINUED | OUTPATIENT
Start: 2017-11-03 | End: 2017-11-03 | Stop reason: SURG

## 2017-11-03 RX ORDER — SIMETHICONE 80 MG
80 TABLET,CHEWABLE ORAL 4 TIMES DAILY PRN
Status: DISCONTINUED | OUTPATIENT
Start: 2017-11-03 | End: 2017-11-04 | Stop reason: HOSPADM

## 2017-11-03 RX ORDER — IBUPROFEN 400 MG/1
400 TABLET ORAL EVERY 6 HOURS PRN
Status: DISCONTINUED | OUTPATIENT
Start: 2017-11-03 | End: 2017-11-04 | Stop reason: HOSPADM

## 2017-11-03 RX ORDER — CELECOXIB 200 MG/1
200 CAPSULE ORAL ONCE
Status: COMPLETED | OUTPATIENT
Start: 2017-11-03 | End: 2017-11-03

## 2017-11-03 RX ORDER — PREGABALIN 75 MG/1
150 CAPSULE ORAL ONCE
Status: COMPLETED | OUTPATIENT
Start: 2017-11-03 | End: 2017-11-03

## 2017-11-03 RX ORDER — OXYCODONE HYDROCHLORIDE 5 MG/1
5 TABLET ORAL EVERY 4 HOURS PRN
Status: DISCONTINUED | OUTPATIENT
Start: 2017-11-03 | End: 2017-11-04 | Stop reason: HOSPADM

## 2017-11-03 RX ADMIN — PREGABALIN 150 MG: 75 CAPSULE ORAL at 06:39

## 2017-11-03 RX ADMIN — WATER 2 G: 1 INJECTION INTRAMUSCULAR; INTRAVENOUS; SUBCUTANEOUS at 08:06

## 2017-11-03 RX ADMIN — EPHEDRINE SULFATE 10 MG: 50 INJECTION INTRAMUSCULAR; INTRAVENOUS; SUBCUTANEOUS at 08:18

## 2017-11-03 RX ADMIN — DEXAMETHASONE SODIUM PHOSPHATE 8 MG: 4 INJECTION, SOLUTION INTRAMUSCULAR; INTRAVENOUS at 08:26

## 2017-11-03 RX ADMIN — GLYCOPYRROLATE 0.4 MG: 0.2 INJECTION, SOLUTION INTRAMUSCULAR; INTRAVENOUS at 10:09

## 2017-11-03 RX ADMIN — SODIUM CHLORIDE, POTASSIUM CHLORIDE, SODIUM LACTATE AND CALCIUM CHLORIDE 75 ML/HR: 600; 310; 30; 20 INJECTION, SOLUTION INTRAVENOUS at 11:41

## 2017-11-03 RX ADMIN — ONDANSETRON 4 MG: 2 INJECTION INTRAMUSCULAR; INTRAVENOUS at 10:09

## 2017-11-03 RX ADMIN — LIDOCAINE HYDROCHLORIDE 50 MG: 10 INJECTION, SOLUTION INFILTRATION; PERINEURAL at 08:06

## 2017-11-03 RX ADMIN — SODIUM CHLORIDE, POTASSIUM CHLORIDE, SODIUM LACTATE AND CALCIUM CHLORIDE: 600; 310; 30; 20 INJECTION, SOLUTION INTRAVENOUS at 10:03

## 2017-11-03 RX ADMIN — SCOPALAMINE 1 PATCH: 1 PATCH, EXTENDED RELEASE TRANSDERMAL at 07:41

## 2017-11-03 RX ADMIN — PROPOFOL 200 MG: 10 INJECTION, EMULSION INTRAVENOUS at 08:06

## 2017-11-03 RX ADMIN — CELECOXIB 200 MG: 200 CAPSULE ORAL at 06:44

## 2017-11-03 RX ADMIN — SODIUM CHLORIDE, POTASSIUM CHLORIDE, SODIUM LACTATE AND CALCIUM CHLORIDE 9 ML/HR: 600; 310; 30; 20 INJECTION, SOLUTION INTRAVENOUS at 06:39

## 2017-11-03 RX ADMIN — IBUPROFEN 400 MG: 400 TABLET ORAL at 16:45

## 2017-11-03 RX ADMIN — NEOSTIGMINE METHYLSULFATE 3 MG: 1 INJECTION, SOLUTION INTRAVENOUS at 10:09

## 2017-11-03 RX ADMIN — PROPOFOL 25 MCG/KG/MIN: 10 INJECTION, EMULSION INTRAVENOUS at 08:21

## 2017-11-03 RX ADMIN — SODIUM CHLORIDE, POTASSIUM CHLORIDE, SODIUM LACTATE AND CALCIUM CHLORIDE: 600; 310; 30; 20 INJECTION, SOLUTION INTRAVENOUS at 08:00

## 2017-11-03 RX ADMIN — FAMOTIDINE 20 MG: 20 TABLET, FILM COATED ORAL at 16:45

## 2017-11-03 RX ADMIN — SODIUM CHLORIDE, POTASSIUM CHLORIDE, SODIUM LACTATE AND CALCIUM CHLORIDE 75 ML/HR: 600; 310; 30; 20 INJECTION, SOLUTION INTRAVENOUS at 20:00

## 2017-11-03 RX ADMIN — FENTANYL CITRATE 100 MCG: 50 INJECTION, SOLUTION INTRAMUSCULAR; INTRAVENOUS at 08:06

## 2017-11-03 RX ADMIN — LIDOCAINE HYDROCHLORIDE 0.5 ML: 10 INJECTION, SOLUTION EPIDURAL; INFILTRATION; INTRACAUDAL; PERINEURAL at 06:39

## 2017-11-03 RX ADMIN — FAMOTIDINE 20 MG: 20 TABLET, FILM COATED ORAL at 06:39

## 2017-11-03 NOTE — ANESTHESIA PREPROCEDURE EVALUATION
Anesthesia Evaluation     Patient summary reviewed and Nursing notes reviewed          Airway   Mallampati: I  TM distance: >3 FB  Neck ROM: full  no difficulty expected  Dental      Pulmonary    (+) sleep apnea,   Cardiovascular - negative cardio ROS    ECG reviewed        Neuro/Psych  (+) headaches, psychiatric history Depression,    GI/Hepatic/Renal/Endo    (+) obesity, morbid obesity, hiatal hernia, GERD, liver disease, diabetes mellitus,     Musculoskeletal     (+) back pain,   Abdominal    Substance History - negative use     OB/GYN negative ob/gyn ROS         Other                                        Anesthesia Plan    ASA 3     general     intravenous induction     Plan discussed with CRNA.

## 2017-11-03 NOTE — ANESTHESIA POSTPROCEDURE EVALUATION
Patient: Maria Dolores Sandoval    Procedure Summary     Date Anesthesia Start Anesthesia Stop Room / Location    11/03/17 0802   [facility]       Procedure Diagnosis Surgeon Provider    TOTAL LAPAROSCOPIC HYSTERECTOMY, BILATERAL SALPINGO OOPHORECTOMY WITH DAVINCI ROBOT (N/A Abdomen) Complex atypical endometrial hyperplasia  (Complex atypical endometrial hyperplasia [N85.02]) MD Isaac Lopez MD          Anesthesia Type: general  Last vitals  BP   137/86 (11/03/17 0633)   Temp   98.4 °F (36.9 °C) (11/03/17 0633)   Pulse       Resp         SpO2         Post Anesthesia Care and Evaluation    Patient location during evaluation: PACU  Patient participation: complete - patient participated  Level of consciousness: awake and alert  Pain score: 0  Pain management: adequate  Airway patency: patent  Anesthetic complications: No anesthetic complications  PONV Status: none  Cardiovascular status: hemodynamically stable and acceptable  Respiratory status: nonlabored ventilation, acceptable and nasal cannula  Hydration status: acceptable    Comments: BP 93/60   Temp 97.6 °F (36.9 °C) SpO2 96% Resp 12 HR 69 (Temporal Artery )   [facility] 10/26/2017

## 2017-11-03 NOTE — OP NOTE
Subjective     Date of Service:  11/03/17  Time of Service:  10:17 AM    Surgical Staff: Surgeon(s) and Role:     * Ni Faulkner MD - Primary   Additional Staff: Jamie GRIFFITHS,  resident   Pre-operative diagnosis(es): Pre-Op Diagnosis Codes:     * Complex atypical endometrial hyperplasia [N85.02]  BMI = 42.49     Post-operative diagnosis(es): Post-Op Diagnosis Codes:   same     Procedure(s): Procedure(s):  TOTAL LAPAROSCOPIC HYSTERECTOMY, BILATERAL SALPINGO OOPHORECTOMY WITH DAVINCI ROBOT     Antibiotics: cefoxitin (Mefoxin) ordered on call to OR     Anesthesia: Type: General  ASA:  III     Objective      Operative findings: At laparoscopy, uterus was enlarged for age.  Adnexa were unremarkable.  Limited visualization of the abdominal cavity was unremarkable.  On frozen section, no cancer was identified.     Specimens removed:   ID Type Source Tests Collected by Time Destination   A :  Tissue Uterus with Cervix, Bilateral Tubes and Ovaries TISSUE EXAM Ni Faulkner MD 11/3/2017 0914       Fluid Intake:    Output:     I/O this shift:  In: 1000 [I.V.:1000]  Out: 300 [Urine:300]     Blood products used: No   Drains: Urethral Catheter 11/03/17 0826 100% silicone 16 (Active)          Implant Information: Nothing was implanted during the procedure   Complications: No immediate   Intraoperative consult(s):    Condition: stable   Disposition: to PACU and then admit to  medical - surgical floor       Indications:    Patient is a 52 year old woman with CAH.  Risks and benefits of procedure were discussed.  Consent was signed and on chart.  Plan was made for 23 hour observation due to patient's comorbidities including morbid obesity and obstructive sleep apnea.    Procedures:    Procedure:   After obtaining informed consent, the patient was  taken to the operating room and underwent general endotracheal anesthesia after  patient and site verification. The feet were placed in Julio César stirrups. The arms were tucked at the  sides. Steep Trendelenburg  positioning was tested and found to be adequate. The abdomen, perineum, and  vagina were prepped and draped in the usual sterile fashion. Umanzor catheter was  anchored. Retractors were used to visualize the cervix. Cervix was sounded and the appropriate uterine manipulator was called for.  Uterine manipulator was secured with out difficulty.  Attention was turned to the abdomen. Prior  to each incision, skin was injected with 0.5% Marcaine with epinephrine. A 12  mm trocar was inserted at the umbilical midline position in the open  technique without difficulty. Laparoscope was introduced to confirm  positioning. Steep Trendelenburg was called for. The abdomen was insufflated to  a pressure of 15 mmHg with CO2 gas.  2 left-sided 8 mm and 2 right-sided 8 mm trochars were all placed under direct visualization.  The above findings were noted.  [employer] robot was docked to the patient and surgeon retired to the operating console.    The peritoneum overlying the pelvic sidewalls was divided with monopolar scissors.  Infundibulopelvic ligaments were isolated from surrounding structures and  pedicles were created using bipolar cautery and scissors. Likewise, the round  ligaments were divided. Anterior and posterior leaves of the broad ligament  were divided with monopolar scissors. A bladder  flap was developed.  Uterine vessels were isolated. Pedicles were created at the level of the uterine vessels using bipolar cautery and scissors. Cardinal ligament and uterosacral ligament complexes were divided in a similar fashion. Monopolar scissors were used to perform a circumferential colpotomy and the specimen was handed off intact via the vagina for frozen section with the above noted results.     The vaginal cuff was closed with 0 Vicryl suture in a running locking fashion x2 layers.Good hemostasis was noted. Additional hemostasis  was achieved at the pelvis as needed using bipolar cautery. [employer]  robot  was undocked from the patient and the surgeon returned to the bedside.  0 Vicryl suture at the umbilical midline fascial incision. At that point, no fascial defects could be visualized.  The skin was closed with 3-0  Monocryl in subcuticular stitch and glue was placed at the skin. The vagina was  Inspected.  Occluder and all instruments had been removed from the vagina. The patient was taken to the recovery room in good condition. There were no immediate complications. All counts were correct.            Ni Faulkner MD  11/03/17  10:17 AM

## 2017-11-03 NOTE — H&P
Patient Care Team:      Chief complaint: abnormal uterine bleeding    Subjective:  Patient is a 52 y.o.female presents with long history of abnormal uterine bleeding. She denies any recent changes in presenting symptoms.  She underwent D&C  In August.  Previous biopsy has revealed atypical hyperplasia. She presents today for hysterectomy.  She denies any recent fever or chills.    Review of Systems:  General ROS: negative  Cardiovascular ROS: no chest pain or dyspnea on exertion  Respiratory ROS: positive for - shortness of breath...chronic.  Denies cough      Allergies:   Allergies   Allergen Reactions   • Codeine GI Intolerance     vomiting          Latex: no  Contrast Dye: no    Home Meds    Prescriptions Prior to Admission   Medication Sig Dispense Refill Last Dose   • cetirizine (zyrTEC) 10 MG tablet Take 5 mg by mouth Daily.   11/3/2017 at 0530   • levothyroxine (SYNTHROID, LEVOTHROID) 125 MCG tablet Take 125 mcg by mouth Daily.   11/3/2017 at 0530   • sertraline (ZOLOFT) 100 MG tablet Take 150 mg by mouth Daily.   11/3/2017 at 0530     PMH:   Past Medical History:   Diagnosis Date   • Arthritis    • Back pain    • Depression    • Diabetes mellitus     no meds currently; checks blood sugars at home occasioannly   • Diarrhea following gastrointestinal surgery     since cholecystectomy    • Disease of thyroid gland     hypothyroidism    • Fatty liver    • Fatty liver    • GERD (gastroesophageal reflux disease)    • Headache    • Hiatal hernia    • History of anemia     due to heavy periods    • History of depression    • History of migraine     early adulthood after a concussion-none recently    • Insomnia    • Polycystic disease, ovaries    • Presenile cataract    • Sleep apnea with use of continuous positive airway pressure (CPAP)     auto range of 6-16   • Wears glasses      PSH:    Past Surgical History:   Procedure Laterality Date   • CHOLECYSTECTOMY  2014   • COLONOSCOPY  2017    polyps remoed    • D&C  HYSTEROSCOPY ENDOMETRIAL ABLATION N/A 8/30/2017    Procedure: HYSTEROSCOPY DILATATION AND CURETTAGE, ENDOMETRIAL ABLATION, POSSIBLE REMOVAL OF LEIOMYOMATA;  Surgeon: Veronica Riley MD;  [facility];  Service:    • D&C HYSTEROSCOPY MYOSURE N/A 8/30/2017    Procedure: DILATATION AND CURETTAGE HYSTEROSCOPY WITH MYOSURE;  Surgeon: Veronica Riley MD;  Location:  [facility];  Service:    • DIAGNOSTIC LAPAROSCOPY  2007    removed uterine septum    • HERNIA REPAIR  2014    no mesh used   • VAGINA SURGERY      removal of polyps   • WISDOM TOOTH EXTRACTION       Immunization History: pneumo: 2016   Flu: 2017  Tetanus: 2017  Social History:   Tobacco: no   Alcohol: no      Physical Exam:/86 (BP Location: Right arm, Patient Position: Lying)  Temp 98.4 °F (36.9 °C) (Temporal Artery )   LMP 10/26/2017      General Appearance:    Alert, cooperative, no distress, appears stated age   Head:    Normocephalic, without obvious abnormality, atraumatic   Lungs:     Clear to auscultation bilaterally, respirations unlabored    Heart: Regular rate and rhythm, S1 and S2 normal, no murmur, rub    or gallop    Abdomen:    Soft without tenderness, obese   Breast Exam:    deferred   Genitalia:    deferred   Extremities:   Extremities normal, atraumatic, no cyanosis or edema   Skin:   Skin color, texture, turgor normal, no rashes or lesions   Neurologic:   Grossly intact     Results Review: Chem profile( elevated ALT,AST), CBC, EKG and CXR on chart.     Impression: Atypical hyperplasia, Mennorrhagia    Plan: For Laparoscopic hysterectomy, Bilateral Salpingo/Oopherectomy, possible lymph node dissection today.  ELISA Acosta 11/3/2017 6:51 AM          I saw and evaluated the patient. I agree with the findings and the plan of care as documented in the note.    If cancer, clinical stage I ([id number])    Ni Faulkner MD  11/03/17  7:13 AM

## 2017-11-03 NOTE — ANESTHESIA PROCEDURE NOTES
Airway  Urgency: elective    Airway not difficult    General Information and Staff    Patient location during procedure: OR  CRNA: SHANIA MARY    Indications and Patient Condition  Indications for airway management: airway protection    Preoxygenated: yes  MILS not maintained throughout  Mask difficulty assessment: 2 - vent by mask + OA or adjuvant +/- NMBA    Final Airway Details  Final airway type: endotracheal airway      Successful airway: ETT  Cuffed: yes   Successful intubation technique: direct laryngoscopy  Facilitating devices/methods: cricoid pressure  Endotracheal tube insertion site: oral  Blade: Erika  Blade size: #3  ETT size: 7.5 mm  Cormack-Lehane Classification: grade IIa - partial view of glottis  Placement verified by: chest auscultation and capnometry   Measured from: lips  ETT to lips (cm): 20  Number of attempts at approach: 1    Additional Comments  Negative epigastric sounds, Breath sound equal bilaterally with symmetric chest rise and fall, lips and teeth as preop, atraumatic

## 2017-11-03 NOTE — PLAN OF CARE
Problem: Perioperative Period (Adult)  Goal: Signs and Symptoms of Listed Potential Problems Will be Absent or Manageable (Perioperative Period)    11/03/17 1109   Perioperative Period   Problems Assessed (Perioperative Period) all   Problems Present (Perioperative Period) none

## 2017-11-04 VITALS
WEIGHT: 263 LBS | OXYGEN SATURATION: 92 % | TEMPERATURE: 98.5 F | DIASTOLIC BLOOD PRESSURE: 59 MMHG | SYSTOLIC BLOOD PRESSURE: 112 MMHG | RESPIRATION RATE: 18 BRPM | HEART RATE: 80 BPM | BODY MASS INDEX: 42.27 KG/M2 | HEIGHT: 66 IN

## 2017-11-04 PROBLEM — F32.A DEPRESSION: Status: ACTIVE | Noted: 2017-11-04

## 2017-11-04 PROBLEM — Z91.09 ENVIRONMENTAL ALLERGIES: Status: ACTIVE | Noted: 2017-11-04

## 2017-11-04 PROBLEM — G47.33 OSA (OBSTRUCTIVE SLEEP APNEA): Status: ACTIVE | Noted: 2017-11-04

## 2017-11-04 PROBLEM — E03.9 ACQUIRED HYPOTHYROIDISM: Status: ACTIVE | Noted: 2017-11-04

## 2017-11-04 LAB
ANION GAP SERPL CALCULATED.3IONS-SCNC: 8 MMOL/L (ref 3–11)
BASOPHILS # BLD AUTO: 0.02 10*3/MM3 (ref 0–0.2)
BASOPHILS NFR BLD AUTO: 0.2 % (ref 0–1)
BUN BLD-MCNC: 10 MG/DL (ref 9–23)
BUN/CREAT SERPL: 12.5 (ref 7–25)
CALCIUM SPEC-SCNC: 8.7 MG/DL (ref 8.7–10.4)
CHLORIDE SERPL-SCNC: 104 MMOL/L (ref 99–109)
CO2 SERPL-SCNC: 26 MMOL/L (ref 20–31)
CREAT BLD-MCNC: 0.8 MG/DL (ref 0.6–1.3)
DEPRECATED RDW RBC AUTO: 49.1 FL (ref 37–54)
EOSINOPHIL # BLD AUTO: 0.12 10*3/MM3 (ref 0–0.3)
EOSINOPHIL NFR BLD AUTO: 1 % (ref 0–3)
ERYTHROCYTE [DISTWIDTH] IN BLOOD BY AUTOMATED COUNT: 16.2 % (ref 11.3–14.5)
GFR SERPL CREATININE-BSD FRML MDRD: 75 ML/MIN/1.73
GLUCOSE BLD-MCNC: 113 MG/DL (ref 70–100)
HCT VFR BLD AUTO: 36.8 % (ref 34.5–44)
HGB BLD-MCNC: 11.1 G/DL (ref 11.5–15.5)
IMM GRANULOCYTES # BLD: 0.04 10*3/MM3 (ref 0–0.03)
IMM GRANULOCYTES NFR BLD: 0.3 % (ref 0–0.6)
LYMPHOCYTES # BLD AUTO: 1.24 10*3/MM3 (ref 0.6–4.8)
LYMPHOCYTES NFR BLD AUTO: 9.9 % (ref 24–44)
MCH RBC QN AUTO: 24.9 PG (ref 27–31)
MCHC RBC AUTO-ENTMCNC: 30.2 G/DL (ref 32–36)
MCV RBC AUTO: 82.5 FL (ref 80–99)
MONOCYTES # BLD AUTO: 0.9 10*3/MM3 (ref 0–1)
MONOCYTES NFR BLD AUTO: 7.2 % (ref 0–12)
NEUTROPHILS # BLD AUTO: 10.25 10*3/MM3 (ref 1.5–8.3)
NEUTROPHILS NFR BLD AUTO: 81.4 % (ref 41–71)
PLATELET # BLD AUTO: 294 10*3/MM3 (ref 150–450)
PMV BLD AUTO: 9.6 FL (ref 6–12)
POTASSIUM BLD-SCNC: 3.3 MMOL/L (ref 3.5–5.5)
RBC # BLD AUTO: 4.46 10*6/MM3 (ref 3.89–5.14)
SODIUM BLD-SCNC: 138 MMOL/L (ref 132–146)
WBC NRBC COR # BLD: 12.57 10*3/MM3 (ref 3.5–10.8)

## 2017-11-04 PROCEDURE — 94799 UNLISTED PULMONARY SVC/PX: CPT

## 2017-11-04 PROCEDURE — 94660 CPAP INITIATION&MGMT: CPT

## 2017-11-04 PROCEDURE — 85025 COMPLETE CBC W/AUTO DIFF WBC: CPT | Performed by: OBSTETRICS & GYNECOLOGY

## 2017-11-04 PROCEDURE — 80048 BASIC METABOLIC PNL TOTAL CA: CPT | Performed by: OBSTETRICS & GYNECOLOGY

## 2017-11-04 PROCEDURE — G0378 HOSPITAL OBSERVATION PER HR: HCPCS

## 2017-11-04 PROCEDURE — 25010000002 ENOXAPARIN PER 10 MG: Performed by: OBSTETRICS & GYNECOLOGY

## 2017-11-04 RX ORDER — DOCUSATE SODIUM 250 MG
250 CAPSULE ORAL 2 TIMES DAILY PRN
Qty: 60 CAPSULE | Refills: 3 | Status: SHIPPED | OUTPATIENT
Start: 2017-11-04

## 2017-11-04 RX ORDER — POTASSIUM CHLORIDE 750 MG/1
40 CAPSULE, EXTENDED RELEASE ORAL AS NEEDED
Status: DISCONTINUED | OUTPATIENT
Start: 2017-11-04 | End: 2017-11-04 | Stop reason: HOSPADM

## 2017-11-04 RX ORDER — IBUPROFEN 600 MG/1
600 TABLET ORAL EVERY 6 HOURS PRN
Qty: 30 TABLET | Refills: 1 | Status: SHIPPED | OUTPATIENT
Start: 2017-11-04

## 2017-11-04 RX ORDER — POTASSIUM CHLORIDE 750 MG/1
40 CAPSULE, EXTENDED RELEASE ORAL ONCE
Qty: 4 CAPSULE | Refills: 0 | Status: SHIPPED | OUTPATIENT
Start: 2017-11-04 | End: 2017-11-04

## 2017-11-04 RX ORDER — POTASSIUM CHLORIDE 1.5 G/1.77G
40 POWDER, FOR SOLUTION ORAL AS NEEDED
Status: DISCONTINUED | OUTPATIENT
Start: 2017-11-04 | End: 2017-11-04 | Stop reason: HOSPADM

## 2017-11-04 RX ORDER — OXYCODONE HYDROCHLORIDE 5 MG/1
5 TABLET ORAL EVERY 4 HOURS PRN
Qty: 20 TABLET | Refills: 0 | Status: SHIPPED | OUTPATIENT
Start: 2017-11-04 | End: 2017-11-13

## 2017-11-04 RX ADMIN — CETIRIZINE HYDROCHLORIDE 5 MG: 10 TABLET, FILM COATED ORAL at 08:58

## 2017-11-04 RX ADMIN — FAMOTIDINE 20 MG: 20 TABLET, FILM COATED ORAL at 08:58

## 2017-11-04 RX ADMIN — IBUPROFEN 400 MG: 400 TABLET ORAL at 08:58

## 2017-11-04 RX ADMIN — ENOXAPARIN SODIUM 40 MG: 40 INJECTION SUBCUTANEOUS at 08:58

## 2017-11-04 RX ADMIN — LEVOTHYROXINE SODIUM 125 MCG: 125 TABLET ORAL at 05:57

## 2017-11-04 RX ADMIN — OXYCODONE HYDROCHLORIDE 5 MG: 5 TABLET ORAL at 04:18

## 2017-11-04 RX ADMIN — SERTRALINE HYDROCHLORIDE 150 MG: 100 TABLET ORAL at 08:56

## 2017-11-04 RX ADMIN — DOCUSATE SODIUM 100 MG: 100 CAPSULE, LIQUID FILLED ORAL at 08:58

## 2017-11-04 NOTE — PROGRESS NOTES
Gynecologic Oncology   Daily Progress Note    Chief Complaint: post op    Subjective   Patient did well overnight.  Her pain is controlled.  She is not having nausea or emesis.  She is tolerating a regular diet.  She is voiding spontaneously and is passing gas.  She is ambulating.      Objective   Temp:  [97 °F (36.1 °C)-98.5 °F (36.9 °C)] 98.5 °F (36.9 °C)  Heart Rate:  [65-86] 80  Resp:  [12-18] 18  BP: ()/(58-82) 112/59  Vitals:    11/04/17 0906   BP: 112/59   Pulse: 80   Resp: 18   Temp: 98.5 °F (36.9 °C)   SpO2: 92%     I/O last 3 completed shifts:  In: 2097.7 [P.O.:420; I.V.:1677.7]  Out: 2880 [Urine:2850; Blood:30]     GENERAL: Alert, well-appearing female in no apparent distress.    CARDIOVASCULAR: Normal rate, regular rhythm, no murmurs, rubs, or gallops.    RESPIRATORY: Clear to auscultation bilaterally, normal respiratory effort  GASTROINTESTINAL:  Soft, appropriately tender, non-distended, no rebound or guarding.  Positive bowel sounds.  Incision(s) c/d/i.  GENITOURINARY: guzman previously removed.    SKIN:  Warm, dry, well-perfused.    PSYCHIATRIC: AO x3, with appropriate affect, normal thought processes  EXREMITIES: Symmetric. No peripheral edema.    Lab Results   Component Value Date    WBC 12.57 (H) 11/04/2017    HGB 11.1 (L) 11/04/2017    HCT 36.8 11/04/2017    MCV 82.5 11/04/2017     11/04/2017    NEUTROABS 10.25 (H) 11/04/2017    GLUCOSE 113 (H) 11/04/2017    BUN 10 11/04/2017    CREATININE 0.80 11/04/2017    EGFRIFNONA 75 11/04/2017     11/04/2017    K 3.3 (L) 11/04/2017     11/04/2017    CO2 26.0 11/04/2017    CALCIUM 8.7 11/04/2017         Assessment/Plan   Maria Dolores Sandoval is a 52 y.o. female POD1 s/p Robotic assisted total laparoscopic hysterectomy, bilateral salpingo-oophorectomy complex atypical hyperplasia    1.  Post-operative care  -Routine care (encourage ambulation, IS use, advance diet as tolerated, saline lock IV, bowel regimen, VTE prophylaxis)    2.  Medical  problems being addressed this hospital stay  Depression- Zoloft  Hypothyroid - synthroid  Allergies - zyrtec  MELISA- cpap last pm  Fatty liver - no tylenol  Hypokalemia - replacement ordered    3.  Disposition  -d/c home today     OR and precautions reviewed        Ni Faulkner MD  11/04/17  10:18 AM

## 2017-11-04 NOTE — DISCHARGE INSTRUCTIONS
1) No driving for 1-2 weeks and no longer taking narcotics.   2) May shower / sponge bathe >24 hours after surgery, No tub baths/soaking  3) Do not lift / push / pull more then 20 lb. for 6 weeks  4) Pelvic rest for 6 weeks

## 2017-11-04 NOTE — DISCHARGE SUMMARY
Gynecologic Oncology   [facility]   Discharge Summary    Date of Admission: 11/3/2017    Date of Discharge: 11/4/2017    Admission Diagnoses:  Complex atypical hyperplasia  Morbid obesity  Hypothyroidism  Depression  Obstructive sleep apnea  Environmental allergies  Fatty liver      Discharge Diagnoses: Same + hypokalemia      Hospital Course:  Maria Dolores Sandoval is a 52 y.o. female who presented with a diagnosis of complex atypical hyperplasia and plan was made for surgical intervention.      On 11/3/2017 she was admitted and underwent robotic-assisted total laparoscopic hysterectomy, bilateral salpingo-oophorectomy.  Please refer to dictated operative note for further details.  Post-operatively she did well.  Her diet was advanced.  Due to fatty liver patient was not given Tylenol.  Nonsurgical medical illnesses were appropriately managed during her hospital stay.  CPAP was administered overnight.  Her POD1 labs showed stable H/H.  Chemistries were remarkable for hypokalemia.  Replacement was ordered..  By POD1 she was tolerating a general diet, voiding spontaneously, having her pain controlled with oral medications, and otherwise meeting criteria for discharge and she was discharged home in stable condition.    Discharge Medications:   Maria Dolores Sandoval   Home Medication Instructions SHAVONNE:[id number]    Printed on:11/04/17 1030   Medication Information                      cetirizine (zyrTEC) 10 MG tablet  Take 5 mg by mouth Daily.             docusate sodium (COLACE) 250 MG capsule  Take 1 capsule by mouth 2 (Two) Times a Day As Needed for Constipation.             ibuprofen (ADVIL,MOTRIN) 600 MG tablet  Take 1 tablet by mouth Every 6 (Six) Hours As Needed for Mild Pain .             levothyroxine (SYNTHROID, LEVOTHROID) 125 MCG tablet  Take 125 mcg by mouth Daily.             oxyCODONE (ROXICODONE) 5 MG immediate release tablet  Take 1 tablet by mouth Every 4 (Four) Hours As Needed for Severe Pain  for up  to 9 days.             potassium chloride (MICRO-K) 10 MEQ CR capsule  Take 4 capsules by mouth 1 (One) Time for 1 dose. 4 hours after previous dose             sertraline (ZOLOFT) 100 MG tablet  Take 150 mg by mouth Daily.                 Discharge Instructions:  She was instructed to call or return to medical attention for fever, severe pain, persistent nausea and vomiting, questions regarding medications, concerns regarding her incisions, excessive vaginal bleeding or discharge, or for any other acute concerns.  She was also instructed not to drive while taking narcotic pain medications, to abide by pelvic rest, avoid tub baths, and to avoid heavy lifting for at least 6 weeks.  Patient was educated regarding constipation prevention.      Condition at Discharge: Stable    Discharge Destination: Home    Results pending at time of discharge: Final surgical pathology     Follow Up: 3 weeks with Lucie    Electronically Signed by: Ni Faulkner MD  Date: 11/4/2017      Time:  10:30 AM

## 2017-11-06 LAB
CYTO UR: NORMAL
LAB AP CASE REPORT: NORMAL
LAB AP CLINICAL INFORMATION: NORMAL
Lab: NORMAL
Lab: NORMAL
PATH REPORT.FINAL DX SPEC: NORMAL
PATH REPORT.GROSS SPEC: NORMAL

## 2017-11-07 ENCOUNTER — TELEPHONE (OUTPATIENT)
Dept: GYNECOLOGIC ONCOLOGY | Facility: CLINIC | Age: 52
End: 2017-11-07

## 2017-11-07 NOTE — TELEPHONE ENCOUNTER
Phoned pt per Dr. Faulkner's request, informed her of pathology showing no cancer, only hyperplasia, same as before surgery.  Pt v/u, asked her about her post op appt needing to be scheduled, pt says she will call back.  She will call before if needed.

## 2017-11-21 ENCOUNTER — TELEPHONE (OUTPATIENT)
Dept: GYNECOLOGIC ONCOLOGY | Facility: CLINIC | Age: 52
End: 2017-11-21

## 2017-11-21 NOTE — TELEPHONE ENCOUNTER
"Patient had called and stated that she fell. She took the brunt of the fall on her hands and elbow. After that she had some light spotting. She denies pain or saturating a pad. She denies fever. Her bleeding has slowed down. She states she is having drainage from her belly button. It is not hard nor hot to touch with no fever. She notes it seems superficial with a little \"pus\". I told her to clean with normal soap and water, watch for changes and fever. She has a follow up appointment on 11/30/2017. She is to call if any of her symptoms worsen before then.   "

## 2017-11-30 ENCOUNTER — OFFICE VISIT (OUTPATIENT)
Dept: GYNECOLOGIC ONCOLOGY | Facility: CLINIC | Age: 52
End: 2017-11-30

## 2017-11-30 VITALS
WEIGHT: 259 LBS | SYSTOLIC BLOOD PRESSURE: 150 MMHG | TEMPERATURE: 97.7 F | OXYGEN SATURATION: 97 % | DIASTOLIC BLOOD PRESSURE: 81 MMHG | BODY MASS INDEX: 41.8 KG/M2 | RESPIRATION RATE: 16 BRPM | HEART RATE: 84 BPM

## 2017-11-30 DIAGNOSIS — Z98.890 POST-OPERATIVE STATE: Primary | ICD-10-CM

## 2017-11-30 PROCEDURE — 99024 POSTOP FOLLOW-UP VISIT: CPT | Performed by: OBSTETRICS & GYNECOLOGY

## 2017-12-02 PROBLEM — Z98.890 POST-OPERATIVE STATE: Status: ACTIVE | Noted: 2017-12-02

## 2024-07-29 ENCOUNTER — APPOINTMENT (OUTPATIENT)
Dept: CT IMAGING | Facility: HOSPITAL | Age: 59
End: 2024-07-29
Payer: COMMERCIAL

## 2024-07-29 ENCOUNTER — APPOINTMENT (OUTPATIENT)
Dept: GENERAL RADIOLOGY | Facility: HOSPITAL | Age: 59
End: 2024-07-29
Payer: COMMERCIAL

## 2024-07-29 ENCOUNTER — HOSPITAL ENCOUNTER (EMERGENCY)
Facility: HOSPITAL | Age: 59
Discharge: HOME | End: 2024-07-29
Attending: STUDENT IN AN ORGANIZED HEALTH CARE EDUCATION/TRAINING PROGRAM
Payer: COMMERCIAL

## 2024-07-29 VITALS
RESPIRATION RATE: 18 BRPM | BODY MASS INDEX: 34.39 KG/M2 | DIASTOLIC BLOOD PRESSURE: 82 MMHG | HEART RATE: 77 BPM | HEIGHT: 66 IN | WEIGHT: 214 LBS | SYSTOLIC BLOOD PRESSURE: 116 MMHG | TEMPERATURE: 98.2 F | OXYGEN SATURATION: 98 %

## 2024-07-29 DIAGNOSIS — K21.9 GASTROESOPHAGEAL REFLUX DISEASE, UNSPECIFIED WHETHER ESOPHAGITIS PRESENT: ICD-10-CM

## 2024-07-29 DIAGNOSIS — R07.9 CHEST PAIN, UNSPECIFIED TYPE: Primary | ICD-10-CM

## 2024-07-29 DIAGNOSIS — R91.1 LUNG NODULE: ICD-10-CM

## 2024-07-29 LAB
ALBUMIN SERPL-MCNC: 4 G/DL (ref 3.5–5.2)
ALBUMIN/GLOB SERPL: 1.7 G/DL
ALP SERPL-CCNC: 94 U/L (ref 39–117)
ALT SERPL W P-5'-P-CCNC: 22 U/L (ref 1–33)
ANION GAP SERPL CALCULATED.3IONS-SCNC: 13 MMOL/L (ref 5–15)
AST SERPL-CCNC: 23 U/L (ref 1–32)
ATMOSPHERIC PRESS: ABNORMAL MM[HG]
BASE EXCESS BLDV CALC-SCNC: 2.4 MMOL/L (ref -2–2)
BASOPHILS # BLD AUTO: 0.03 10*3/MM3 (ref 0–0.2)
BASOPHILS NFR BLD AUTO: 0.4 % (ref 0–1.5)
BDY SITE: ABNORMAL
BILIRUB SERPL-MCNC: 0.3 MG/DL (ref 0–1.2)
BODY TEMPERATURE: 37
BUN SERPL-MCNC: 13 MG/DL (ref 6–20)
BUN/CREAT SERPL: 13.1 (ref 7–25)
CALCIUM SPEC-SCNC: 10.1 MG/DL (ref 8.6–10.5)
CHLORIDE SERPL-SCNC: 106 MMOL/L (ref 98–107)
CO2 BLDA-SCNC: 28.3 MMOL/L (ref 22–33)
CO2 SERPL-SCNC: 23 MMOL/L (ref 22–29)
COHGB MFR BLD: 1.1 %
CREAT SERPL-MCNC: 0.99 MG/DL (ref 0.57–1)
D DIMER PPP FEU-MCNC: <0.27 MCGFEU/ML (ref 0–0.59)
DEPRECATED RDW RBC AUTO: 44.4 FL (ref 37–54)
EGFRCR SERPLBLD CKD-EPI 2021: 65.8 ML/MIN/1.73
EOSINOPHIL # BLD AUTO: 0.42 10*3/MM3 (ref 0–0.4)
EOSINOPHIL NFR BLD AUTO: 6.2 % (ref 0.3–6.2)
EPAP: 0
ERYTHROCYTE [DISTWIDTH] IN BLOOD BY AUTOMATED COUNT: 13.8 % (ref 12.3–15.4)
GLOBULIN UR ELPH-MCNC: 2.3 GM/DL
GLUCOSE SERPL-MCNC: 169 MG/DL (ref 65–99)
HCO3 BLDV-SCNC: 27 MMOL/L (ref 22–28)
HCT VFR BLD AUTO: 44.4 % (ref 34–46.6)
HGB BLD-MCNC: 14.2 G/DL (ref 12–15.9)
HGB BLDA-MCNC: 14.9 G/DL (ref 14–18)
HOLD SPECIMEN: NORMAL
IMM GRANULOCYTES # BLD AUTO: 0.04 10*3/MM3 (ref 0–0.05)
IMM GRANULOCYTES NFR BLD AUTO: 0.6 % (ref 0–0.5)
INHALED O2 CONCENTRATION: 21 %
IPAP: 0
LIPASE SERPL-CCNC: 41 U/L (ref 13–60)
LYMPHOCYTES # BLD AUTO: 1.55 10*3/MM3 (ref 0.7–3.1)
LYMPHOCYTES NFR BLD AUTO: 22.8 % (ref 19.6–45.3)
MAGNESIUM SERPL-MCNC: 2 MG/DL (ref 1.6–2.6)
MCH RBC QN AUTO: 27.8 PG (ref 26.6–33)
MCHC RBC AUTO-ENTMCNC: 32 G/DL (ref 31.5–35.7)
MCV RBC AUTO: 86.9 FL (ref 79–97)
METHGB BLD QL: 0.1 %
MODALITY: ABNORMAL
MONOCYTES # BLD AUTO: 0.38 10*3/MM3 (ref 0.1–0.9)
MONOCYTES NFR BLD AUTO: 5.6 % (ref 5–12)
NEUTROPHILS NFR BLD AUTO: 4.38 10*3/MM3 (ref 1.7–7)
NEUTROPHILS NFR BLD AUTO: 64.4 % (ref 42.7–76)
NRBC BLD AUTO-RTO: 0 /100 WBC (ref 0–0.2)
NT-PROBNP SERPL-MCNC: <36 PG/ML (ref 0–900)
OXYHGB MFR BLDV: 90.6 %
PAW @ PEAK INSP FLOW SETTING VENT: 0 CMH2O
PCO2 BLDV: 41 MM HG (ref 41–51)
PH BLDV: 7.43 PH UNITS (ref 7.31–7.41)
PLATELET # BLD AUTO: 286 10*3/MM3 (ref 140–450)
PMV BLD AUTO: 9.6 FL (ref 6–12)
PO2 BLDV: 59.4 MM HG (ref 27–53)
POTASSIUM SERPL-SCNC: 4 MMOL/L (ref 3.5–5.2)
PROT SERPL-MCNC: 6.3 G/DL (ref 6–8.5)
RBC # BLD AUTO: 5.11 10*6/MM3 (ref 3.77–5.28)
SODIUM SERPL-SCNC: 142 MMOL/L (ref 136–145)
T4 FREE SERPL-MCNC: 1.11 NG/DL (ref 0.92–1.68)
TOTAL RATE: 0 BREATHS/MINUTE
TROPONIN T SERPL HS-MCNC: 8 NG/L
TSH SERPL DL<=0.05 MIU/L-ACNC: 0.45 UIU/ML (ref 0.27–4.2)
WBC NRBC COR # BLD AUTO: 6.8 10*3/MM3 (ref 3.4–10.8)
WHOLE BLOOD HOLD COAG: NORMAL
WHOLE BLOOD HOLD SPECIMEN: NORMAL

## 2024-07-29 PROCEDURE — 71045 X-RAY EXAM CHEST 1 VIEW: CPT

## 2024-07-29 PROCEDURE — 71260 CT THORAX DX C+: CPT

## 2024-07-29 PROCEDURE — 82805 BLOOD GASES W/O2 SATURATION: CPT

## 2024-07-29 PROCEDURE — 25510000001 IOPAMIDOL 61 % SOLUTION: Performed by: STUDENT IN AN ORGANIZED HEALTH CARE EDUCATION/TRAINING PROGRAM

## 2024-07-29 PROCEDURE — 83880 ASSAY OF NATRIURETIC PEPTIDE: CPT | Performed by: STUDENT IN AN ORGANIZED HEALTH CARE EDUCATION/TRAINING PROGRAM

## 2024-07-29 PROCEDURE — 80050 GENERAL HEALTH PANEL: CPT | Performed by: STUDENT IN AN ORGANIZED HEALTH CARE EDUCATION/TRAINING PROGRAM

## 2024-07-29 PROCEDURE — 85379 FIBRIN DEGRADATION QUANT: CPT | Performed by: STUDENT IN AN ORGANIZED HEALTH CARE EDUCATION/TRAINING PROGRAM

## 2024-07-29 PROCEDURE — 93005 ELECTROCARDIOGRAM TRACING: CPT | Performed by: STUDENT IN AN ORGANIZED HEALTH CARE EDUCATION/TRAINING PROGRAM

## 2024-07-29 PROCEDURE — 83735 ASSAY OF MAGNESIUM: CPT | Performed by: STUDENT IN AN ORGANIZED HEALTH CARE EDUCATION/TRAINING PROGRAM

## 2024-07-29 PROCEDURE — 83690 ASSAY OF LIPASE: CPT | Performed by: STUDENT IN AN ORGANIZED HEALTH CARE EDUCATION/TRAINING PROGRAM

## 2024-07-29 PROCEDURE — 84484 ASSAY OF TROPONIN QUANT: CPT | Performed by: STUDENT IN AN ORGANIZED HEALTH CARE EDUCATION/TRAINING PROGRAM

## 2024-07-29 PROCEDURE — 84439 ASSAY OF FREE THYROXINE: CPT | Performed by: STUDENT IN AN ORGANIZED HEALTH CARE EDUCATION/TRAINING PROGRAM

## 2024-07-29 PROCEDURE — 99285 EMERGENCY DEPT VISIT HI MDM: CPT

## 2024-07-29 RX ORDER — ASPIRIN 81 MG/1
324 TABLET, CHEWABLE ORAL ONCE
Status: COMPLETED | OUTPATIENT
Start: 2024-07-29 | End: 2024-07-29

## 2024-07-29 RX ORDER — SODIUM CHLORIDE 0.9 % (FLUSH) 0.9 %
10 SYRINGE (ML) INJECTION AS NEEDED
Status: DISCONTINUED | OUTPATIENT
Start: 2024-07-29 | End: 2024-07-29 | Stop reason: HOSPADM

## 2024-07-29 RX ADMIN — IOPAMIDOL 80 ML: 612 INJECTION, SOLUTION INTRAVENOUS at 14:56

## 2024-07-29 RX ADMIN — ASPIRIN 324 MG: 81 TABLET, CHEWABLE ORAL at 12:46

## 2024-07-29 NOTE — DISCHARGE INSTRUCTIONS
Follow-up with your primary care doctor discussed both the lung nodule with them as well as whether it would be beneficial to follow-up with a GI doctor and perhaps have an endoscopy as the GERD symptoms seem to be worsening.  You will be contacted with follow-up in the lung nodule clinic and with cardiology follow-up.  While today's workup was reassuring if your symptoms change or worsen please return to the ED or seek other medical care.

## 2024-07-29 NOTE — ED PROVIDER NOTES
EMERGENCY DEPARTMENT ENCOUNTER    Pt Name: Maria Dolores Sandoval  MRN: [id number]  Pt :   1965  Room Number:    Date of encounter:  2024  PCP: Christiano Lamb DO  ED Provider: Seferino Baumann MD    Historian: Patient      HPI:  Chief Complaint: Chest pain        Context: Maria Dolores Sandoval is a 59-year-old woman who presents the emergency department for evaluation of now resolved chest pain.  She has history of obesity, hypertension, prediabetes, hypothyroidism.  She also has history of GERD and thinks that may be what the pain was she was woken at 1:30 AM with severe substernal chest pain as well as diaphoresis.  The pain did not radiate and she did not appreciate to be exertional and did get better with Tums. She has been pain-free since the 30-minute episode of pain but family wanted her to be checked out she currently denies symptoms.  No other complaints at this time       PAST MEDICAL HISTORY  Past Medical History:   Diagnosis Date    Arthritis     Back pain     Depression     Diabetes mellitus     no meds currently; checks blood sugars at home occasioannly    Diarrhea following gastrointestinal surgery     since cholecystectomy     Disease of thyroid gland     hypothyroidism     Fatty liver     Fatty liver     GERD (gastroesophageal reflux disease)     Headache     Hiatal hernia     History of anemia     due to heavy periods     History of depression     History of migraine     early adulthood after a concussion-none recently     Insomnia     Polycystic disease, ovaries     Presenile cataract     Sleep apnea with use of continuous positive airway pressure (CPAP)     auto range of 6-16    Wears glasses          PAST SURGICAL HISTORY  Past Surgical History:   Procedure Laterality Date    CHOLECYSTECTOMY      COLONOSCOPY  2017    polyps remoed     D & C HYSTEROSCOPY ENDOMETRIAL ABLATION N/A 2017    Procedure: HYSTEROSCOPY DILATATION AND CURETTAGE, ENDOMETRIAL ABLATION, POSSIBLE  REMOVAL OF LEIOMYOMATA;  Surgeon: Veronica Riley MD;  Location:  [facility];  Service:     D & C HYSTEROSCOPY MYOSURE N/A 8/30/2017    Procedure: DILATATION AND CURETTAGE HYSTEROSCOPY WITH MYOSURE;  Surgeon: Veronica Riley MD;  Location:  [facility];  Service:     DIAGNOSTIC LAPAROSCOPY  2007    removed uterine septum     HERNIA REPAIR  2014    no mesh used    TOTAL LAPAROSCOPIC HYSTERECTOMY N/A 11/3/2017    Procedure: TOTAL LAPAROSCOPIC HYSTERECTOMY, BILATERAL SALPINGO OOPHORECTOMY WITH DAVINCI ROBOT;  Surgeon: Ni Faulkner MD;  Location:  [facility];  Service:     VAGINA SURGERY      removal of polyps    WISDOM TOOTH EXTRACTION           FAMILY HISTORY  Family History   Problem Relation Age of Onset    Diabetes Mother     Heart disease Mother     Cirrhosis Father     Ovarian cancer Sister 30        half sister    Breast cancer Paternal Aunt 69    Colon cancer Maternal Uncle          SOCIAL HISTORY  Social History     Socioeconomic History    Marital status:     Number of children: 0   Tobacco Use    Smoking status: Never    Smokeless tobacco: Never   Substance and Sexual Activity    Alcohol use: No    Drug use: No    Sexual activity: Defer         ALLERGIES  Codeine        REVIEW OF SYSTEMS  Review of Systems       All systems reviewed and negative except for those discussed in HPI.       PHYSICAL EXAM    I have reviewed the triage vital signs and nursing notes.    ED Triage Vitals [07/29/24 1230]   Temp Heart Rate Resp BP SpO2   98.2 °F (36.8 °C) 97 18 142/95 99 %      Temp src Heart Rate Source Patient Position BP Location FiO2 (%)   Oral Monitor -- Right arm --       Physical Exam  GENERAL:   Appears in no acute distress.   HENT: Nares patent.  EYES: No scleral icterus.  CV: Regular rhythm, regular rate.  RESPIRATORY: Normal effort.  No audible wheezes, rales or rhonchi.  ABDOMEN: Soft, nontender  MUSCULOSKELETAL: No deformities.   NEURO: Alert, moves all extremities, follows  commands.  SKIN: Warm, dry, no rash visualized.      LAB RESULTS  Recent Results (from the past 24 hour(s))   ECG 12 Lead ED Triage Standing Order; Chest Pain    Collection Time: 07/29/24 12:37 PM   Result Value Ref Range    QT Interval 386 ms    QTC Interval 436 ms   High Sensitivity Troponin T    Collection Time: 07/29/24 12:45 PM    Specimen: Blood   Result Value Ref Range    HS Troponin T 8 <14 ng/L   Comprehensive Metabolic Panel    Collection Time: 07/29/24 12:45 PM    Specimen: Blood   Result Value Ref Range    Glucose 169 (H) 65 - 99 mg/dL    BUN 13 6 - 20 mg/dL    Creatinine 0.99 0.57 - 1.00 mg/dL    Sodium 142 136 - 145 mmol/L    Potassium 4.0 3.5 - 5.2 mmol/L    Chloride 106 98 - 107 mmol/L    CO2 23.0 22.0 - 29.0 mmol/L    Calcium 10.1 8.6 - 10.5 mg/dL    Total Protein 6.3 6.0 - 8.5 g/dL    Albumin 4.0 3.5 - 5.2 g/dL    ALT (SGPT) 22 1 - 33 U/L    AST (SGOT) 23 1 - 32 U/L    Alkaline Phosphatase 94 39 - 117 U/L    Total Bilirubin 0.3 0.0 - 1.2 mg/dL    Globulin 2.3 gm/dL    A/G Ratio 1.7 g/dL    BUN/Creatinine Ratio 13.1 7.0 - 25.0    Anion Gap 13.0 5.0 - 15.0 mmol/L    eGFR 65.8 >60.0 mL/min/1.73   Lipase    Collection Time: 07/29/24 12:45 PM    Specimen: Blood   Result Value Ref Range    Lipase 41 13 - 60 U/L   BNP    Collection Time: 07/29/24 12:45 PM    Specimen: Blood   Result Value Ref Range    proBNP <36.0 0.0 - 900.0 pg/mL   Green Top (Gel)    Collection Time: 07/29/24 12:45 PM   Result Value Ref Range    Extra Tube Hold for add-ons.    Lavender Top    Collection Time: 07/29/24 12:45 PM   Result Value Ref Range    Extra Tube hold for add-on    Gold Top - SST    Collection Time: 07/29/24 12:45 PM   Result Value Ref Range    Extra Tube Hold for add-ons.    Gray Top    Collection Time: 07/29/24 12:45 PM   Result Value Ref Range    Extra Tube Hold for add-ons.    Light Blue Top    Collection Time: 07/29/24 12:45 PM   Result Value Ref Range    Extra Tube Hold for add-ons.    CBC Auto Differential     Collection Time: 07/29/24 12:45 PM    Specimen: Blood   Result Value Ref Range    WBC 6.80 3.40 - 10.80 10*3/mm3    RBC 5.11 3.77 - 5.28 10*6/mm3    Hemoglobin 14.2 12.0 - 15.9 g/dL    Hematocrit 44.4 34.0 - 46.6 %    MCV 86.9 79.0 - 97.0 fL    MCH 27.8 26.6 - 33.0 pg    MCHC 32.0 31.5 - 35.7 g/dL    RDW 13.8 12.3 - 15.4 %    RDW-SD 44.4 37.0 - 54.0 fl    MPV 9.6 6.0 - 12.0 fL    Platelets 286 140 - 450 10*3/mm3    Neutrophil % 64.4 42.7 - 76.0 %    Lymphocyte % 22.8 19.6 - 45.3 %    Monocyte % 5.6 5.0 - 12.0 %    Eosinophil % 6.2 0.3 - 6.2 %    Basophil % 0.4 0.0 - 1.5 %    Immature Grans % 0.6 (H) 0.0 - 0.5 %    Neutrophils, Absolute 4.38 1.70 - 7.00 10*3/mm3    Lymphocytes, Absolute 1.55 0.70 - 3.10 10*3/mm3    Monocytes, Absolute 0.38 0.10 - 0.90 10*3/mm3    Eosinophils, Absolute 0.42 (H) 0.00 - 0.40 10*3/mm3    Basophils, Absolute 0.03 0.00 - 0.20 10*3/mm3    Immature Grans, Absolute 0.04 0.00 - 0.05 10*3/mm3    nRBC 0.0 0.0 - 0.2 /100 WBC   D-dimer, Quantitative    Collection Time: 07/29/24 12:45 PM    Specimen: Blood   Result Value Ref Range    D-Dimer, Quantitative <0.27 0.00 - 0.59 MCGFEU/mL   Magnesium    Collection Time: 07/29/24 12:45 PM    Specimen: Blood   Result Value Ref Range    Magnesium 2.0 1.6 - 2.6 mg/dL   TSH    Collection Time: 07/29/24 12:45 PM    Specimen: Blood   Result Value Ref Range    TSH 0.445 0.270 - 4.200 uIU/mL   T4, Free    Collection Time: 07/29/24 12:45 PM    Specimen: Blood   Result Value Ref Range    Free T4 1.11 0.92 - 1.68 ng/dL   Blood Gas, Venous With Co-Ox    Collection Time: 07/29/24  1:56 PM    Specimen: Venous Blood   Result Value Ref Range    Site Nurse/Dr Draw     pH, Venous 7.427 (H) 7.310 - 7.410 pH Units    pCO2, Venous 41.0 41.0 - 51.0 mm Hg    pO2, Venous 59.4 (H) 27.0 - 53.0 mm Hg    HCO3, Venous 27.0 22.0 - 28.0 mmol/L    Base Excess, Venous 2.4 (H) -2.0 - 2.0 mmol/L    Hemoglobin, Blood Gas 14.9 14 - 18 g/dL    Oxyhemoglobin Venous 90.6 %    Methemoglobin  Venous 0.1 %    Carboxyhemoglobin Venous 1.1 %    CO2 Content 28.3 22 - 33 mmol/L    Temperature 37.0     Barometric Pressure for Blood Gas      Modality Room Air     FIO2 21 %    Rate 0 Breaths/minute    PIP 0 cmH2O    IPAP 0     EPAP 0        If labs were ordered, I independently reviewed the results and considered them in treating the patient.        RADIOLOGY  CT Chest With Contrast Diagnostic    Result Date: 7/29/2024  CT CHEST W CONTRAST DIAGNOSTIC Date of Exam: 7/29/2024 2:53 PM EDT Indication: chest pain rresplved, right midlung mass on xray, read requested CT scan.. Comparison: None available. Technique: Axial CT images were obtained of the chest after the uneventful intravenous administration of 80 cc Isovue-300.  Reconstructed coronal and sagittal images were also obtained. Automated exposure control and iterative construction methods were used. Findings: Maddie/mediastinum: No adenopathy. Thoracic aorta normal in caliber. No pericardial effusion. No definite coronary calcification Lungs/pleura: 1.6 cm ovoid well-circumscribed noncalcified nodule in the inferior right upper lobe. No acute pulmonary abnormality. No pleural effusion Upper Abdomen: Unremarkable Bones/soft tissues: No suspicious bone lesion     1. 1.6 cm right upper lobe nodule. The lesion does not have overtly suspicious characteristics. Depending on patient risk factors, consider 3-month follow-up chest CT, PET/CT, or tissue diagnosis. 2. No acute process demonstrated. Electronically Signed: José Miguel Hill  7/29/2024 3:20 PM EDT  Workstation ID: [id number]    XR Chest 1 View    Result Date: 7/29/2024  XR CHEST 1 VW Date of Exam: 7/29/2024 12:38 PM EDT Indication: Chest Pain Triage Protocol Comparison: 11/2/2017 Findings: There is an 11 mm nodular density projecting in the right midlung. This appears new from prior study. CT is recommended for further evaluation. Lungs are otherwise clear. No pleural effusion or pneumothorax. Heart size and  pulmonary vessels are within normal limits. Bony structures are unremarkable.     Impression: 1. 11 mm nodular density projecting within the right midlung. CT scan of the chest is recommended for further evaluation. 2. No other acute cardiopulmonary disease identified. Electronically Signed: Benny Pruett MD  7/29/2024 12:52 PM EDT  Workstation ID: [id number]     I ordered and independently reviewed the above noted radiographic studies.      I viewed images of chest x-ray which showed no acute pathology that I can appreciate per my independent interpretation, however formal overread read as nodular midlung abnormality recommended CT scan which has been added.    See radiologist's dictation for official interpretation.        PROCEDURES    Procedures    ECG 12 Lead ED Triage Standing Order; Chest Pain   Preliminary Result   Test Reason : ED Triage Standing Order~   Blood Pressure :   */*   mmHG   Vent. Rate :  77 BPM     Atrial Rate :  77 BPM      P-R Int : 138 ms          QRS Dur : 104 ms       QT Int : 386 ms       P-R-T Axes :  20 -20  40 degrees      QTc Int : 436 ms      Normal sinus rhythm with sinus arrhythmia   Low voltage QRS   Borderline ECG   When compared with ECG of 29-AUG-2017 13:52,   No significant change was found      Referred By: EDMD           Confirmed By:       ECG 12 Lead ED Triage Standing Order; Chest Pain    (Results Pending)       MEDICATIONS GIVEN IN ER    Medications   sodium chloride 0.9 % flush 10 mL (has no administration in time range)   aspirin chewable tablet 324 mg (324 mg Oral Given 7/29/24 1246)   iopamidol (ISOVUE-300) 61 % injection 100 mL (80 mL Intravenous Given 7/29/24 1456)         MEDICAL DECISION MAKING, PROGRESS, and CONSULTS    All labs, if obtained, have been independently reviewed by me.  All radiology studies, if obtained, have been reviewed by me and the radiologist dictating the report.  All EKG's, if obtained, have been independently viewed and interpreted by me/my  attending physician.      Discussion below represents my analysis of pertinent findings related to patient's condition, differential diagnosis, treatment plan and final disposition.          HEART Score: 3                Differential diagnosis:    Myocardial infarction, angina, pulmonary embolism, pneumonia, pneumothorax, sepsis, anemia, electrolyte abnormality      Additional sources:    - Discussed/ obtained information from independent historians:      - External (non-ED) record review: Patient PCP note show history of sleep apnea, overweight, restless leg syndrome, hypertension, GERD, hypothyroidism, fatty liver    - Chronic or social conditions impacting care: Obesity, hypertension    - Shared decision making: Patient/patient representative in complete agreement with current plans for evaluation and management.      Orders placed during this visit:  Orders Placed This Encounter   Procedures    XR Chest 1 View    CT Chest With Contrast Diagnostic    [place] Draw    High Sensitivity Troponin T    Comprehensive Metabolic Panel    Lipase    BNP    CBC Auto Differential    D-dimer, Quantitative    Magnesium    TSH    T4, Free    Blood Gas, Venous -With Co-Ox Panel: Yes    High Sensitivity Troponin T 2Hr    Blood Gas, Venous With Co-Ox    Ambulatory Referral to [facility] - [place]    Ambulatory Referral to [facility]    NPO Diet NPO Type: Strict NPO    Undress & Gown    Continuous Pulse Oximetry    Oxygen Therapy- Nasal Cannula; Titrate 1-6 LPM Per SpO2; 90 - 95%    ECG 12 Lead ED Triage Standing Order; Chest Pain    ECG 12 Lead ED Triage Standing Order; Chest Pain    Insert Peripheral IV    CBC & Differential    Green Top (Gel)    Lavender Top    Gold Top - SST    Gray Top    Light Blue Top         Additional orders considered but not ordered:      ED Course:    Consultants:      ED Course as of 07/29/24 1541   Mon Jul 29, 2024   1247 This a very nice 59-year-old woman who presents the  emergency department for evaluation of now resolved chest pain.  She has history of obesity, hypertension, prediabetes, hypothyroidism.  She also has history of GERD and thinks that may be what the pain was she was woken at 1:30 AM with severe substernal chest pain [CC]   1248 ECG 12 Lead ED Triage Standing Order; Chest Pain   as well as diaphoresis.  The pain did not radiate and she did not appreciate to be exertional and did get better with Tums. She has been pain-free since the 30-minute episode of pain but family wanted her to be checked out she currently denies symptoms.  No other complaints at this time [CC]   1335 CBC reassuring nonactionable metabolic panel does show mild hyperglycemia 169 she has history of prediabetes but with recent stress state I am not comfortable making diagnosis of diabetes today this will need monitored by her PCP I suspect is a stress response no elevation lipase pointing away from pancreatitis no elevation in high-sensitivity troponin and pain was 12 hours ago at this point repeat troponin not required.  Negative dimer, normal thyroid function and BNP.  X-ray however does show 11 mm nodular mass in the right midlung request CT scan which I have added on. [CC]   1540 CT scan better demonstrates the lung nodule in the right upper lobe but otherwise no acute pathology formal overread comments on it being not overly concerning for malignancy but still needs a repeat scan have given her referral to our lung nodule clinic.  We discussed the reassuring workup she says she had oatmeal immediately prior to arrival and thinks that is the reason for the slightly elevated blood sugar.  Discharged in stable condition with strict return precautions given both lung nodule and cardiology follow-up. [CC]      ED Course User Index  [CC] Seferino Baumann MD              Shared Decision Making:  After my consideration of clinical presentation and any laboratory/radiology studies obtained, I  discussed the findings with the patient/patient representative who is in agreement with the treatment plan and the final disposition.   Risks and benefits of discharge and/or observation/admission were discussed.       AS OF 15:41 EDT VITALS:    BP - 116/82  HR - 77  TEMP - 98.2 °F (36.8 °C) (Oral)  O2 SATS - 98%                  DIAGNOSIS  Final diagnoses:   Chest pain, unspecified type   Lung nodule   Gastroesophageal reflux disease, unspecified whether esophagitis present         DISPOSITION  DISCHARGE    Patient discharged in stable condition.    Reviewed implications of results, diagnosis, meds, responsibility to follow up, warning signs and symptoms of possible worsening, potential complications and reasons to return to ER.    Patient/Family voiced understanding of above instructions.    Discussed plan for discharge, as there is no emergent indication for admission.  Pt/family is agreeable and understands need for follow up and possible repeat testing.  Pt/family is aware that discharge does not mean that nothing is wrong but that it indicates no emergency is currently present that requires admission and they must continue care with follow-up as given below or with a physician of their choice.     FOLLOW-UP  [facility] PULMONARY & CRITICAL CARE MEDICINE  [address]  [facility] [id number] [phone]        Christiano Lamb DO  [address]   [facility] [id number] [phone]          [facility] CARDIOLOGY  [address]  [address]  [facility] [id number] [phone]             Medication List      No changes were made to your prescriptions during this visit.             Please note that portions of this document were completed with voice recognition software.        Seferino Baumann MD  07/29/24 0980

## 2024-07-30 ENCOUNTER — OFFICE VISIT (OUTPATIENT)
Dept: CARDIOLOGY | Facility: HOSPITAL | Age: 59
End: 2024-07-30
Payer: COMMERCIAL

## 2024-07-30 VITALS
SYSTOLIC BLOOD PRESSURE: 122 MMHG | WEIGHT: 220.38 LBS | DIASTOLIC BLOOD PRESSURE: 70 MMHG | HEIGHT: 66 IN | HEART RATE: 76 BPM | TEMPERATURE: 97.5 F | RESPIRATION RATE: 18 BRPM | BODY MASS INDEX: 35.42 KG/M2 | OXYGEN SATURATION: 97 %

## 2024-07-30 DIAGNOSIS — R06.09 DOE (DYSPNEA ON EXERTION): ICD-10-CM

## 2024-07-30 DIAGNOSIS — R07.89 CHEST PAIN, ATYPICAL: Primary | ICD-10-CM

## 2024-07-30 LAB
QT INTERVAL: 386 MS
QTC INTERVAL: 436 MS

## 2024-08-01 NOTE — PROGRESS NOTES
"Chief Complaint  Establish Care and Chest Pain    Subjective    History of Present Illness {CC  Problem List  Visit  Diagnosis   Encounters  Notes  Medications  Labs  Result Review Imaging  Media :23}       History of Present Illness   59 year old female presents to the office at the request of [facility] ED for ongoing evaluation of her chest pain. She reports that chest pain was sharp in nature and located in the center and left of her chest . She had associated GARCIAS as well as fatigue. She notes that she has lost 50 lbs intentionally with diet and exercise. Hx of orthostatic hypotension in her 30s.   Objective     Vital Signs:   Vitals:    07/30/24 1020 07/30/24 1022   BP: 119/73 122/70   BP Location: Left arm Left arm   Patient Position: Standing Sitting   Cuff Size: Adult Adult   Pulse: 81 76   Resp:  18   Temp:  97.5 °F (36.4 °C)   TempSrc:  Temporal   SpO2: 96% 97%   Weight:  100 kg (220 lb 6 oz)   Height:  167.6 cm (66\")     Body mass index is 35.57 kg/m².  Physical Exam  Vitals and nursing note reviewed.   Constitutional:       Appearance: Normal appearance.   HENT:      Head: Normocephalic.   Eyes:      Pupils: Pupils are equal, round, and reactive to light.   Cardiovascular:      Rate and Rhythm: Normal rate and regular rhythm.      Pulses: Normal pulses.      Heart sounds: Normal heart sounds. No murmur heard.  Pulmonary:      Effort: Pulmonary effort is normal.      Breath sounds: Normal breath sounds.   Abdominal:      General: Bowel sounds are normal.      Palpations: Abdomen is soft.   Musculoskeletal:         General: Normal range of motion.      Cervical back: Normal range of motion.      Right lower leg: No edema.      Left lower leg: No edema.   Skin:     General: Skin is warm and dry.      Capillary Refill: Capillary refill takes less than 2 seconds.   Neurological:      Mental Status: She is alert and oriented to person, place, and time.   Psychiatric:         Mood and Affect: Mood normal.    "      Thought Content: Thought content normal.            Result Review  Data Reviewed:{ Labs  Result Review  Imaging  Med Tab  Media :23}   ECG 12 Lead ED Triage Standing Order; Chest Pain (07/29/2024 12:37)   High Sensitivity Troponin T (07/29/2024 12:45)  CBC & Differential (07/29/2024 12:45)  Comprehensive Metabolic Panel (07/29/2024 12:45)  Lipase (07/29/2024 12:45)  BNP (07/29/2024 12:45)  D-dimer, Quantitative (07/29/2024 12:45)  Magnesium (07/29/2024 12:45)  TSH (07/29/2024 12:45)  T4, Free (07/29/2024 12:45)           Assessment and Plan {CC Problem List  Visit Diagnosis  ROS  Review (Popup)  Health Maintenance  Quality  BestPractice  Medications  SmartSets  SnapShot Encounters  Media :23}   1. Chest pain, atypical  Cardiac risk factors; age, obesity, htn  - Treadmill Stress Test; Future    2. GARCIAS (dyspnea on exertion)    - Treadmill Stress Test; Future          Follow Up {Instructions Charge Capture  Follow-up Communications :23}   Return if symptoms worsen or fail to improve.    Patient was given instructions and counseling regarding her condition or for health maintenance advice. Please see specific information pulled into the AVS if appropriate.  Patient was instructed to call the [facility] with any questions, concerns, or worsening symptoms.

## 2024-08-06 ENCOUNTER — PATIENT OUTREACH (OUTPATIENT)
Dept: ONCOLOGY | Facility: CLINIC | Age: 59
End: 2024-08-06
Payer: COMMERCIAL

## 2024-08-06 ENCOUNTER — OFFICE VISIT (OUTPATIENT)
Dept: PULMONOLOGY | Facility: CLINIC | Age: 59
End: 2024-08-06
Payer: COMMERCIAL

## 2024-08-06 VITALS
HEIGHT: 66 IN | TEMPERATURE: 97.7 F | DIASTOLIC BLOOD PRESSURE: 80 MMHG | SYSTOLIC BLOOD PRESSURE: 128 MMHG | BODY MASS INDEX: 35.68 KG/M2 | WEIGHT: 222 LBS | HEART RATE: 71 BPM | OXYGEN SATURATION: 97 %

## 2024-08-06 DIAGNOSIS — R91.1 INCIDENTAL LUNG NODULE, GREATER THAN OR EQUAL TO 8MM: Primary | ICD-10-CM

## 2024-08-06 PROBLEM — E66.9 OBESITY (BMI 30-39.9): Status: ACTIVE | Noted: 2017-10-01

## 2024-08-06 PROCEDURE — 94729 DIFFUSING CAPACITY: CPT | Performed by: INTERNAL MEDICINE

## 2024-08-06 PROCEDURE — 99204 OFFICE O/P NEW MOD 45 MIN: CPT | Performed by: INTERNAL MEDICINE

## 2024-08-06 PROCEDURE — 94010 BREATHING CAPACITY TEST: CPT | Performed by: INTERNAL MEDICINE

## 2024-08-06 PROCEDURE — 94726 PLETHYSMOGRAPHY LUNG VOLUMES: CPT | Performed by: INTERNAL MEDICINE

## 2024-08-06 NOTE — H&P (VIEW-ONLY)
Subjective:     Chief Complaint:   Chief Complaint   Patient presents with    Lung Nodule       HPI:    Maria Dolores Sandoval is a 59 y.o. female seen in consultation at the request of Dr. Baumann for evaluation of the above    She presented to the urgent treatment center for chest pain on 7/29/2024.  She was referred to our ER.  She underwent a cardiac workup which was negative for acute MI.  A chest x-ray was obtained as part of the protocol and an incidental 11 mm nodule was suspected in the right midlung.  By the time she presented to the ER her chest pain had resolved with Tums and it was suspected she had a GI origin of her pain though she has seen cardiology and a stress test is planned.    She had a chest x-ray in 2017 which did not reveal this nodule.  She underwent a CT scan of the chest while in the ER which revealed a 1.6 cm well-circumscribed ovoid right upper lobe nodule.  There is no adenopathy or other lesions.  There is no spiculation.    She knows of no prior mention of pulmonary nodules.  She does not have any chest symptoms such as pleuritic chest pain, cough, or wheezing.  She does have chronic sinus drainage.  She had a remote negative PPD as a child.  She has no history of lung disease.  She is a lifelong non-smoker.    She does have chronic dyspnea on exertion which she has attributed to her weight but this is chronic and stable.    Current medications are:   Current Outpatient Medications:     buPROPion XL (WELLBUTRIN XL) 150 MG 24 hr tablet, , Disp: , Rfl:     ibuprofen (ADVIL,MOTRIN) 600 MG tablet, Take 1 tablet by mouth Every 6 (Six) Hours As Needed for Mild Pain ., Disp: 30 tablet, Rfl: 1    levothyroxine (SYNTHROID, LEVOTHROID) 125 MCG tablet, Take 1 tablet by mouth Daily., Disp: , Rfl:     lisinopril (PRINIVIL,ZESTRIL) 2.5 MG tablet, , Disp: , Rfl:     OneTouch Ultra test strip, , Disp: , Rfl: .      The patient's relevant past medical, surgical, family and social history were reviewed and  updated in Epic as appropriate.     ROS:    Review of Systems  ROS documented in patient questionnaire ×14 systems.  Reviewed with patient.  Otherwise negative except as noted in HPI.    Objective:    Physical Exam  Vitals and nursing note reviewed.   Constitutional:       Appearance: Normal appearance. She is well-developed.   HENT:      Head: Normocephalic and atraumatic.      Nose: Nose normal.      Mouth/Throat:      Mouth: Mucous membranes are moist.      Pharynx: Oropharynx is clear. No oropharyngeal exudate.   Eyes:      General: No scleral icterus.     Conjunctiva/sclera: Conjunctivae normal.   Neck:      Thyroid: No thyromegaly.      Trachea: No tracheal deviation.   Cardiovascular:      Rate and Rhythm: Normal rate and regular rhythm.      Heart sounds: No murmur heard.     No friction rub. No gallop.   Pulmonary:      Effort: Pulmonary effort is normal. No respiratory distress.      Breath sounds: No wheezing or rales.   Musculoskeletal:         General: No deformity. Normal range of motion.   Skin:     General: Skin is warm and dry.      Findings: No rash.   Neurological:      Mental Status: She is alert and oriented to person, place, and time.   Psychiatric:         Behavior: Behavior normal.         Thought Content: Thought content normal.         Data:     PFT: Normal spirometry.  Normal lung volumes.  Normal diffusing capacity    CT chest images and reports reviewed.  Agree with the report.    Assessment:    Problem List Items Addressed This Visit          Pulmonary Problems    Incidental lung nodule, greater than or equal to 8mm - Primary    Overview     Incidental 1.6 cm right upper lobe well-circumscribed nodule on chest CT 7/29/2024 also noted on chest x-ray that same day in ER.  Not present on chest x-ray dated 2017         Relevant Orders    NM PET/CT Skull Base to Mid Thigh       59-year-old female with an incidentally discovered 1.6 cm well-circumscribed ovoid right upper lobe nodule at the  time of an ER visit for chest pain that was suspected of GI origin on 7/29.  This was apparent on a chest x-ray done that same day and was not apparent on a chest x-ray in 2017, which was the most recent comparison study.  She is a lifelong non-smoker.    A [facility] risk calculation reveals her risk of malignancy to be 25% putting her in the intermediate range.  I recommended proceeding with a PET scan and then likely a biopsy procedure versus resection due to the high malignancy risk.  I showed her the images and discussed this with her clearly and gave her written documentation of the malignancy risk    Plan:     PET imaging as above  Likely schedule biopsy procedure after the above  Discussed in detail with patient in clinic and she was given our number and the number for our lung cancer nurse navigator if she had any questions or was not contacted about scheduling    Level of service justified based on 46 minutes spent in patient care on this date of service including, but not limited to: preparing to see the patient, obtaining and/or reviewing history, performing medically appropriate examination, ordering tests/medicine/procedures, independently interpreting results, documenting clinical information in EHR, and counseling/education of patient/family/caregiver.  This is exclusive of time spent on other separate services such as performing procedures or test interpretation, if applicable.  (Level 4 45-59 minutes; Level 5 60-74 minutes)      Signed by  Rajiv Rucker MD

## 2024-08-06 NOTE — PROGRESS NOTES
Subjective:     Chief Complaint:   Chief Complaint   Patient presents with    Lung Nodule       HPI:    Maria Dolores Sandoval is a 59 y.o. female seen in consultation at the request of Dr. Baumann for evaluation of the above    She presented to the urgent treatment center for chest pain on 7/29/2024.  She was referred to our ER.  She underwent a cardiac workup which was negative for acute MI.  A chest x-ray was obtained as part of the protocol and an incidental 11 mm nodule was suspected in the right midlung.  By the time she presented to the ER her chest pain had resolved with Tums and it was suspected she had a GI origin of her pain though she has seen cardiology and a stress test is planned.    She had a chest x-ray in 2017 which did not reveal this nodule.  She underwent a CT scan of the chest while in the ER which revealed a 1.6 cm well-circumscribed ovoid right upper lobe nodule.  There is no adenopathy or other lesions.  There is no spiculation.    She knows of no prior mention of pulmonary nodules.  She does not have any chest symptoms such as pleuritic chest pain, cough, or wheezing.  She does have chronic sinus drainage.  She had a remote negative PPD as a child.  She has no history of lung disease.  She is a lifelong non-smoker.    She does have chronic dyspnea on exertion which she has attributed to her weight but this is chronic and stable.    Current medications are:   Current Outpatient Medications:     buPROPion XL (WELLBUTRIN XL) 150 MG 24 hr tablet, , Disp: , Rfl:     ibuprofen (ADVIL,MOTRIN) 600 MG tablet, Take 1 tablet by mouth Every 6 (Six) Hours As Needed for Mild Pain ., Disp: 30 tablet, Rfl: 1    levothyroxine (SYNTHROID, LEVOTHROID) 125 MCG tablet, Take 1 tablet by mouth Daily., Disp: , Rfl:     lisinopril (PRINIVIL,ZESTRIL) 2.5 MG tablet, , Disp: , Rfl:     OneTouch Ultra test strip, , Disp: , Rfl: .      The patient's relevant past medical, surgical, family and social history were reviewed and  updated in Epic as appropriate.     ROS:    Review of Systems  ROS documented in patient questionnaire ×14 systems.  Reviewed with patient.  Otherwise negative except as noted in HPI.    Objective:    Physical Exam  Vitals and nursing note reviewed.   Constitutional:       Appearance: Normal appearance. She is well-developed.   HENT:      Head: Normocephalic and atraumatic.      Nose: Nose normal.      Mouth/Throat:      Mouth: Mucous membranes are moist.      Pharynx: Oropharynx is clear. No oropharyngeal exudate.   Eyes:      General: No scleral icterus.     Conjunctiva/sclera: Conjunctivae normal.   Neck:      Thyroid: No thyromegaly.      Trachea: No tracheal deviation.   Cardiovascular:      Rate and Rhythm: Normal rate and regular rhythm.      Heart sounds: No murmur heard.     No friction rub. No gallop.   Pulmonary:      Effort: Pulmonary effort is normal. No respiratory distress.      Breath sounds: No wheezing or rales.   Musculoskeletal:         General: No deformity. Normal range of motion.   Skin:     General: Skin is warm and dry.      Findings: No rash.   Neurological:      Mental Status: She is alert and oriented to person, place, and time.   Psychiatric:         Behavior: Behavior normal.         Thought Content: Thought content normal.         Data:     PFT: Normal spirometry.  Normal lung volumes.  Normal diffusing capacity    CT chest images and reports reviewed.  Agree with the report.    Assessment:    Problem List Items Addressed This Visit          Pulmonary Problems    Incidental lung nodule, greater than or equal to 8mm - Primary    Overview     Incidental 1.6 cm right upper lobe well-circumscribed nodule on chest CT 7/29/2024 also noted on chest x-ray that same day in ER.  Not present on chest x-ray dated 2017         Relevant Orders    NM PET/CT Skull Base to Mid Thigh       59-year-old female with an incidentally discovered 1.6 cm well-circumscribed ovoid right upper lobe nodule at the  time of an ER visit for chest pain that was suspected of GI origin on 7/29.  This was apparent on a chest x-ray done that same day and was not apparent on a chest x-ray in 2017, which was the most recent comparison study.  She is a lifelong non-smoker.    A [facility] risk calculation reveals her risk of malignancy to be 25% putting her in the intermediate range.  I recommended proceeding with a PET scan and then likely a biopsy procedure versus resection due to the high malignancy risk.  I showed her the images and discussed this with her clearly and gave her written documentation of the malignancy risk    Plan:     PET imaging as above  Likely schedule biopsy procedure after the above  Discussed in detail with patient in clinic and she was given our number and the number for our lung cancer nurse navigator if she had any questions or was not contacted about scheduling    Level of service justified based on 46 minutes spent in patient care on this date of service including, but not limited to: preparing to see the patient, obtaining and/or reviewing history, performing medically appropriate examination, ordering tests/medicine/procedures, independently interpreting results, documenting clinical information in EHR, and counseling/education of patient/family/caregiver.  This is exclusive of time spent on other separate services such as performing procedures or test interpretation, if applicable.  (Level 4 45-59 minutes; Level 5 60-74 minutes)      Signed by  Rajiv Rucker MD

## 2024-08-09 ENCOUNTER — HOSPITAL ENCOUNTER (OUTPATIENT)
Dept: CARDIOLOGY | Facility: HOSPITAL | Age: 59
Discharge: HOME | End: 2024-08-09
Admitting: NURSE PRACTITIONER
Payer: COMMERCIAL

## 2024-08-09 ENCOUNTER — PATIENT OUTREACH (OUTPATIENT)
Dept: ONCOLOGY | Facility: CLINIC | Age: 59
End: 2024-08-09
Payer: COMMERCIAL

## 2024-08-09 VITALS — WEIGHT: 222 LBS | HEIGHT: 66 IN | BODY MASS INDEX: 35.68 KG/M2

## 2024-08-09 DIAGNOSIS — R06.09 DOE (DYSPNEA ON EXERTION): ICD-10-CM

## 2024-08-09 DIAGNOSIS — R07.89 CHEST PAIN, ATYPICAL: ICD-10-CM

## 2024-08-09 LAB
BH CV STRESS BP STAGE 1: NORMAL
BH CV STRESS BP STAGE 2: NORMAL
BH CV STRESS DURATION MIN STAGE 1: 3
BH CV STRESS DURATION MIN STAGE 2: 3
BH CV STRESS DURATION MIN STAGE 3: 1
BH CV STRESS DURATION SEC STAGE 1: 0
BH CV STRESS DURATION SEC STAGE 2: 0
BH CV STRESS DURATION SEC STAGE 3: 0
BH CV STRESS GRADE STAGE 1: 10
BH CV STRESS GRADE STAGE 2: 12
BH CV STRESS GRADE STAGE 3: 14
BH CV STRESS HR STAGE 1: 128
BH CV STRESS HR STAGE 2: 157
BH CV STRESS HR STAGE 3: 151
BH CV STRESS METS STAGE 1: 5
BH CV STRESS METS STAGE 2: 7.5
BH CV STRESS METS STAGE 3: 10
BH CV STRESS O2 STAGE 1: 97
BH CV STRESS O2 STAGE 2: 97
BH CV STRESS O2 STAGE 3: 97
BH CV STRESS PROTOCOL 1: NORMAL
BH CV STRESS RECOVERY BP: NORMAL MMHG
BH CV STRESS RECOVERY HR: 104 BPM
BH CV STRESS RECOVERY O2: 95 %
BH CV STRESS SPEED STAGE 1: 1.7
BH CV STRESS SPEED STAGE 2: 2.5
BH CV STRESS SPEED STAGE 3: 3.4
BH CV STRESS STAGE 1: 1
BH CV STRESS STAGE 2: 2
BH CV STRESS STAGE 3: 3
MAXIMAL PREDICTED HEART RATE: 161 BPM
PERCENT MAX PREDICTED HR: 103.11 %
STRESS BASELINE BP: NORMAL MMHG
STRESS BASELINE HR: 90 BPM
STRESS O2 SAT REST: 95 %
STRESS PERCENT HR: 121 %
STRESS POST ESTIMATED WORKLOAD: 8.5 METS
STRESS POST EXERCISE DUR MIN: 7 MIN
STRESS POST EXERCISE DUR SEC: 0 SEC
STRESS POST O2 SAT PEAK: 97 %
STRESS POST PEAK BP: NORMAL MMHG
STRESS POST PEAK HR: 166 BPM
STRESS TARGET HR: 137 BPM

## 2024-08-09 PROCEDURE — 93017 CV STRESS TEST TRACING ONLY: CPT

## 2024-08-16 ENCOUNTER — HOSPITAL ENCOUNTER (OUTPATIENT)
Dept: PET IMAGING | Facility: HOSPITAL | Age: 59
Discharge: HOME | End: 2024-08-16
Payer: COMMERCIAL

## 2024-08-16 DIAGNOSIS — R91.1 INCIDENTAL LUNG NODULE, GREATER THAN OR EQUAL TO 8MM: ICD-10-CM

## 2024-08-16 LAB — GLUCOSE BLDC GLUCOMTR-MCNC: 73 MG/DL (ref 70–130)

## 2024-08-16 PROCEDURE — 0 FLUDEOXYGLUCOSE F18 SOLUTION: Performed by: INTERNAL MEDICINE

## 2024-08-16 PROCEDURE — 82948 REAGENT STRIP/BLOOD GLUCOSE: CPT

## 2024-08-16 PROCEDURE — 78815 PET IMAGE W/CT SKULL-THIGH: CPT

## 2024-08-16 PROCEDURE — A9552 F18 FDG: HCPCS | Performed by: INTERNAL MEDICINE

## 2024-08-16 RX ADMIN — FLUDEOXYGLUCOSE F 18 1 DOSE: 200 INJECTION, SOLUTION INTRAVENOUS at 14:32

## 2024-08-21 ENCOUNTER — PREP FOR SURGERY (OUTPATIENT)
Dept: OTHER | Facility: HOSPITAL | Age: 59
End: 2024-08-21
Payer: COMMERCIAL

## 2024-08-21 DIAGNOSIS — R91.1 INCIDENTAL LUNG NODULE, GREATER THAN OR EQUAL TO 8MM: Primary | ICD-10-CM

## 2024-08-21 RX ORDER — SODIUM CHLORIDE 0.9 % (FLUSH) 0.9 %
10 SYRINGE (ML) INJECTION AS NEEDED
OUTPATIENT
Start: 2024-08-21

## 2024-08-21 RX ORDER — LIDOCAINE HYDROCHLORIDE 40 MG/ML
4 INJECTION, SOLUTION RETROBULBAR; TOPICAL ONCE
OUTPATIENT
Start: 2024-08-21 | End: 2024-08-21

## 2024-08-21 RX ORDER — SODIUM CHLORIDE 9 MG/ML
40 INJECTION, SOLUTION INTRAVENOUS AS NEEDED
OUTPATIENT
Start: 2024-08-21

## 2024-08-21 RX ORDER — SODIUM CHLORIDE 0.9 % (FLUSH) 0.9 %
10 SYRINGE (ML) INJECTION EVERY 12 HOURS SCHEDULED
OUTPATIENT
Start: 2024-08-21

## 2024-08-22 DIAGNOSIS — R91.1 INCIDENTAL LUNG NODULE, GREATER THAN OR EQUAL TO 8MM: Primary | ICD-10-CM

## 2024-08-26 ENCOUNTER — PRE-ADMISSION TESTING (OUTPATIENT)
Dept: PREADMISSION TESTING | Facility: HOSPITAL | Age: 59
End: 2024-08-26
Payer: COMMERCIAL

## 2024-08-26 VITALS — WEIGHT: 221.56 LBS | HEIGHT: 66 IN | BODY MASS INDEX: 35.61 KG/M2

## 2024-08-26 DIAGNOSIS — R91.1 INCIDENTAL LUNG NODULE, GREATER THAN OR EQUAL TO 8MM: ICD-10-CM

## 2024-08-26 LAB
ALBUMIN SERPL-MCNC: 4 G/DL (ref 3.5–5.2)
ALBUMIN/GLOB SERPL: 1.4 G/DL
ALP SERPL-CCNC: 83 U/L (ref 39–117)
ALT SERPL W P-5'-P-CCNC: 22 U/L (ref 1–33)
ANION GAP SERPL CALCULATED.3IONS-SCNC: 10 MMOL/L (ref 5–15)
APTT PPP: 31.1 SECONDS (ref 22–39)
AST SERPL-CCNC: 27 U/L (ref 1–32)
BASOPHILS # BLD AUTO: 0.06 10*3/MM3 (ref 0–0.2)
BASOPHILS NFR BLD AUTO: 0.8 % (ref 0–1.5)
BILIRUB SERPL-MCNC: 0.3 MG/DL (ref 0–1.2)
BUN SERPL-MCNC: 10 MG/DL (ref 6–20)
BUN/CREAT SERPL: 11 (ref 7–25)
CALCIUM SPEC-SCNC: 9.3 MG/DL (ref 8.6–10.5)
CHLORIDE SERPL-SCNC: 106 MMOL/L (ref 98–107)
CO2 SERPL-SCNC: 25 MMOL/L (ref 22–29)
CREAT SERPL-MCNC: 0.91 MG/DL (ref 0.57–1)
DEPRECATED RDW RBC AUTO: 44.3 FL (ref 37–54)
EGFRCR SERPLBLD CKD-EPI 2021: 72.8 ML/MIN/1.73
EOSINOPHIL # BLD AUTO: 0.24 10*3/MM3 (ref 0–0.4)
EOSINOPHIL NFR BLD AUTO: 3.4 % (ref 0.3–6.2)
ERYTHROCYTE [DISTWIDTH] IN BLOOD BY AUTOMATED COUNT: 13.9 % (ref 12.3–15.4)
GLOBULIN UR ELPH-MCNC: 2.9 GM/DL
GLUCOSE SERPL-MCNC: 96 MG/DL (ref 65–99)
HCT VFR BLD AUTO: 42.9 % (ref 34–46.6)
HGB BLD-MCNC: 13.7 G/DL (ref 12–15.9)
IMM GRANULOCYTES # BLD AUTO: 0.02 10*3/MM3 (ref 0–0.05)
IMM GRANULOCYTES NFR BLD AUTO: 0.3 % (ref 0–0.5)
INR PPP: 0.94 (ref 0.89–1.12)
LYMPHOCYTES # BLD AUTO: 1.6 10*3/MM3 (ref 0.7–3.1)
LYMPHOCYTES NFR BLD AUTO: 22.4 % (ref 19.6–45.3)
MCH RBC QN AUTO: 27.8 PG (ref 26.6–33)
MCHC RBC AUTO-ENTMCNC: 31.9 G/DL (ref 31.5–35.7)
MCV RBC AUTO: 87 FL (ref 79–97)
MONOCYTES # BLD AUTO: 0.66 10*3/MM3 (ref 0.1–0.9)
MONOCYTES NFR BLD AUTO: 9.2 % (ref 5–12)
NEUTROPHILS NFR BLD AUTO: 4.56 10*3/MM3 (ref 1.7–7)
NEUTROPHILS NFR BLD AUTO: 63.9 % (ref 42.7–76)
NRBC BLD AUTO-RTO: 0 /100 WBC (ref 0–0.2)
PLATELET # BLD AUTO: 272 10*3/MM3 (ref 140–450)
PMV BLD AUTO: 9.2 FL (ref 6–12)
POTASSIUM SERPL-SCNC: 4.3 MMOL/L (ref 3.5–5.2)
PROT SERPL-MCNC: 6.9 G/DL (ref 6–8.5)
PROTHROMBIN TIME: 12.7 SECONDS (ref 12.2–14.5)
RBC # BLD AUTO: 4.93 10*6/MM3 (ref 3.77–5.28)
SODIUM SERPL-SCNC: 141 MMOL/L (ref 136–145)
WBC NRBC COR # BLD AUTO: 7.14 10*3/MM3 (ref 3.4–10.8)

## 2024-08-26 PROCEDURE — 80053 COMPREHEN METABOLIC PANEL: CPT

## 2024-08-26 PROCEDURE — 85025 COMPLETE CBC W/AUTO DIFF WBC: CPT

## 2024-08-26 PROCEDURE — 85730 THROMBOPLASTIN TIME PARTIAL: CPT

## 2024-08-26 PROCEDURE — 36415 COLL VENOUS BLD VENIPUNCTURE: CPT

## 2024-08-26 PROCEDURE — 85610 PROTHROMBIN TIME: CPT

## 2024-08-26 RX ORDER — LEVOTHYROXINE SODIUM 112 UG/1
112 TABLET ORAL NIGHTLY
COMMUNITY

## 2024-08-26 NOTE — PAT
EKG on chart form July 29 24    Per Anesthesia Request, patient instructed not to take their ACE/ARB medications on the AM of surgery.

## 2024-08-28 ENCOUNTER — HOSPITAL ENCOUNTER (OUTPATIENT)
Facility: HOSPITAL | Age: 59
Discharge: HOME | End: 2024-08-28
Admitting: INTERNAL MEDICINE
Payer: COMMERCIAL

## 2024-08-28 DIAGNOSIS — R91.1 INCIDENTAL LUNG NODULE, GREATER THAN OR EQUAL TO 8MM: ICD-10-CM

## 2024-08-28 PROCEDURE — 71250 CT THORAX DX C-: CPT

## 2024-08-30 ENCOUNTER — ANESTHESIA EVENT (OUTPATIENT)
Dept: GASTROENTEROLOGY | Facility: HOSPITAL | Age: 59
End: 2024-08-30
Payer: COMMERCIAL

## 2024-08-30 ENCOUNTER — APPOINTMENT (OUTPATIENT)
Dept: GENERAL RADIOLOGY | Facility: HOSPITAL | Age: 59
End: 2024-08-30
Payer: COMMERCIAL

## 2024-08-30 ENCOUNTER — ANESTHESIA (OUTPATIENT)
Dept: GASTROENTEROLOGY | Facility: HOSPITAL | Age: 59
End: 2024-08-30
Payer: COMMERCIAL

## 2024-08-30 ENCOUNTER — HOSPITAL ENCOUNTER (OUTPATIENT)
Facility: HOSPITAL | Age: 59
Setting detail: HOSPITAL OUTPATIENT SURGERY
Discharge: HOME | End: 2024-08-30
Attending: INTERNAL MEDICINE | Admitting: INTERNAL MEDICINE
Payer: COMMERCIAL

## 2024-08-30 VITALS
WEIGHT: 220 LBS | HEART RATE: 64 BPM | RESPIRATION RATE: 17 BRPM | OXYGEN SATURATION: 98 % | DIASTOLIC BLOOD PRESSURE: 72 MMHG | TEMPERATURE: 97.7 F | BODY MASS INDEX: 36.65 KG/M2 | SYSTOLIC BLOOD PRESSURE: 113 MMHG | HEIGHT: 65 IN

## 2024-08-30 DIAGNOSIS — R91.1 INCIDENTAL LUNG NODULE, GREATER THAN OR EQUAL TO 8MM: ICD-10-CM

## 2024-08-30 LAB — GLUCOSE BLDC GLUCOMTR-MCNC: 83 MG/DL (ref 70–130)

## 2024-08-30 PROCEDURE — 25010000002 SUGAMMADEX 200 MG/2ML SOLUTION

## 2024-08-30 PROCEDURE — 31623 DX BRONCHOSCOPE/BRUSH: CPT | Performed by: INTERNAL MEDICINE

## 2024-08-30 PROCEDURE — 25010000002 ONDANSETRON PER 1 MG

## 2024-08-30 PROCEDURE — 87070 CULTURE OTHR SPECIMN AEROBIC: CPT | Performed by: INTERNAL MEDICINE

## 2024-08-30 PROCEDURE — 87206 SMEAR FLUORESCENT/ACID STAI: CPT | Performed by: INTERNAL MEDICINE

## 2024-08-30 PROCEDURE — 82948 REAGENT STRIP/BLOOD GLUCOSE: CPT

## 2024-08-30 PROCEDURE — 31628 BRONCHOSCOPY/LUNG BX EACH: CPT | Performed by: INTERNAL MEDICINE

## 2024-08-30 PROCEDURE — 25010000002 PROPOFOL 10 MG/ML EMULSION

## 2024-08-30 PROCEDURE — 88341 IMHCHEM/IMCYTCHM EA ADD ANTB: CPT | Performed by: INTERNAL MEDICINE

## 2024-08-30 PROCEDURE — 31654 BRONCH EBUS IVNTJ PERPH LES: CPT | Performed by: INTERNAL MEDICINE

## 2024-08-30 PROCEDURE — 87205 SMEAR GRAM STAIN: CPT | Performed by: INTERNAL MEDICINE

## 2024-08-30 PROCEDURE — 25010000002 ESMOLOL 100 MG/10ML SOLUTION

## 2024-08-30 PROCEDURE — 31624 DX BRONCHOSCOPE/LAVAGE: CPT | Performed by: INTERNAL MEDICINE

## 2024-08-30 PROCEDURE — 76000 FLUOROSCOPY <1 HR PHYS/QHP: CPT | Performed by: INTERNAL MEDICINE

## 2024-08-30 PROCEDURE — 71045 X-RAY EXAM CHEST 1 VIEW: CPT

## 2024-08-30 PROCEDURE — 25010000002 DEXAMETHASONE PER 1 MG

## 2024-08-30 PROCEDURE — 31627 NAVIGATIONAL BRONCHOSCOPY: CPT | Performed by: INTERNAL MEDICINE

## 2024-08-30 PROCEDURE — 87116 MYCOBACTERIA CULTURE: CPT | Performed by: INTERNAL MEDICINE

## 2024-08-30 PROCEDURE — 31629 BRONCHOSCOPY/NEEDLE BX EACH: CPT | Performed by: INTERNAL MEDICINE

## 2024-08-30 PROCEDURE — 88360 TUMOR IMMUNOHISTOCHEM/MANUAL: CPT | Performed by: INTERNAL MEDICINE

## 2024-08-30 PROCEDURE — 87102 FUNGUS ISOLATION CULTURE: CPT | Performed by: INTERNAL MEDICINE

## 2024-08-30 PROCEDURE — 76000 FLUOROSCOPY <1 HR PHYS/QHP: CPT

## 2024-08-30 PROCEDURE — 25810000003 LACTATED RINGERS PER 1000 ML: Performed by: ANESTHESIOLOGY

## 2024-08-30 PROCEDURE — 88305 TISSUE EXAM BY PATHOLOGIST: CPT | Performed by: INTERNAL MEDICINE

## 2024-08-30 RX ORDER — ESMOLOL HYDROCHLORIDE 10 MG/ML
INJECTION INTRAVENOUS AS NEEDED
Status: DISCONTINUED | OUTPATIENT
Start: 2024-08-30 | End: 2024-08-30 | Stop reason: SURG

## 2024-08-30 RX ORDER — LIDOCAINE HYDROCHLORIDE 10 MG/ML
INJECTION, SOLUTION EPIDURAL; INFILTRATION; INTRACAUDAL; PERINEURAL AS NEEDED
Status: DISCONTINUED | OUTPATIENT
Start: 2024-08-30 | End: 2024-08-30 | Stop reason: SURG

## 2024-08-30 RX ORDER — DEXAMETHASONE SODIUM PHOSPHATE 4 MG/ML
INJECTION, SOLUTION INTRA-ARTICULAR; INTRALESIONAL; INTRAMUSCULAR; INTRAVENOUS; SOFT TISSUE AS NEEDED
Status: DISCONTINUED | OUTPATIENT
Start: 2024-08-30 | End: 2024-08-30 | Stop reason: SURG

## 2024-08-30 RX ORDER — ONDANSETRON 2 MG/ML
INJECTION INTRAMUSCULAR; INTRAVENOUS AS NEEDED
Status: DISCONTINUED | OUTPATIENT
Start: 2024-08-30 | End: 2024-08-30 | Stop reason: SURG

## 2024-08-30 RX ORDER — ROCURONIUM BROMIDE 10 MG/ML
INJECTION, SOLUTION INTRAVENOUS AS NEEDED
Status: DISCONTINUED | OUTPATIENT
Start: 2024-08-30 | End: 2024-08-30 | Stop reason: SURG

## 2024-08-30 RX ORDER — ONDANSETRON 2 MG/ML
4 INJECTION INTRAMUSCULAR; INTRAVENOUS ONCE AS NEEDED
Status: DISCONTINUED | OUTPATIENT
Start: 2024-08-30 | End: 2024-08-30 | Stop reason: HOSPADM

## 2024-08-30 RX ORDER — LIDOCAINE HYDROCHLORIDE 40 MG/ML
4 INJECTION, SOLUTION RETROBULBAR; TOPICAL ONCE
Status: DISCONTINUED | OUTPATIENT
Start: 2024-08-30 | End: 2024-08-30 | Stop reason: HOSPADM

## 2024-08-30 RX ORDER — LIDOCAINE HYDROCHLORIDE 40 MG/ML
SOLUTION TOPICAL AS NEEDED
Status: DISCONTINUED | OUTPATIENT
Start: 2024-08-30 | End: 2024-08-30 | Stop reason: SURG

## 2024-08-30 RX ORDER — SODIUM CHLORIDE, SODIUM LACTATE, POTASSIUM CHLORIDE, CALCIUM CHLORIDE 600; 310; 30; 20 MG/100ML; MG/100ML; MG/100ML; MG/100ML
20 INJECTION, SOLUTION INTRAVENOUS CONTINUOUS
Status: DISCONTINUED | OUTPATIENT
Start: 2024-08-30 | End: 2024-08-30 | Stop reason: HOSPADM

## 2024-08-30 RX ORDER — PHENYLEPHRINE HCL IN 0.9% NACL 1 MG/10 ML
SYRINGE (ML) INTRAVENOUS AS NEEDED
Status: DISCONTINUED | OUTPATIENT
Start: 2024-08-30 | End: 2024-08-30 | Stop reason: SURG

## 2024-08-30 RX ORDER — IPRATROPIUM BROMIDE AND ALBUTEROL SULFATE 2.5; .5 MG/3ML; MG/3ML
3 SOLUTION RESPIRATORY (INHALATION) ONCE AS NEEDED
Status: DISCONTINUED | OUTPATIENT
Start: 2024-08-30 | End: 2024-08-30 | Stop reason: HOSPADM

## 2024-08-30 RX ORDER — SODIUM CHLORIDE 0.9 % (FLUSH) 0.9 %
10 SYRINGE (ML) INJECTION AS NEEDED
Status: DISCONTINUED | OUTPATIENT
Start: 2024-08-30 | End: 2024-08-30 | Stop reason: HOSPADM

## 2024-08-30 RX ORDER — SODIUM CHLORIDE 9 MG/ML
40 INJECTION, SOLUTION INTRAVENOUS AS NEEDED
Status: DISCONTINUED | OUTPATIENT
Start: 2024-08-30 | End: 2024-08-30 | Stop reason: HOSPADM

## 2024-08-30 RX ORDER — PROPOFOL 10 MG/ML
VIAL (ML) INTRAVENOUS AS NEEDED
Status: DISCONTINUED | OUTPATIENT
Start: 2024-08-30 | End: 2024-08-30 | Stop reason: SURG

## 2024-08-30 RX ORDER — SODIUM CHLORIDE 0.9 % (FLUSH) 0.9 %
10 SYRINGE (ML) INJECTION EVERY 12 HOURS SCHEDULED
Status: DISCONTINUED | OUTPATIENT
Start: 2024-08-30 | End: 2024-08-30 | Stop reason: HOSPADM

## 2024-08-30 RX ADMIN — DEXAMETHASONE SODIUM PHOSPHATE 4 MG: 4 INJECTION INTRA-ARTICULAR; INTRALESIONAL; INTRAMUSCULAR; INTRAVENOUS; SOFT TISSUE at 10:08

## 2024-08-30 RX ADMIN — ESMOLOL HYDROCHLORIDE 70 MG: 100 INJECTION, SOLUTION INTRAVENOUS at 10:05

## 2024-08-30 RX ADMIN — LIDOCAINE HYDROCHLORIDE 50 MG: 10 INJECTION, SOLUTION EPIDURAL; INFILTRATION; INTRACAUDAL; PERINEURAL at 10:05

## 2024-08-30 RX ADMIN — Medication 200 MCG: at 10:18

## 2024-08-30 RX ADMIN — ONDANSETRON 4 MG: 2 INJECTION INTRAMUSCULAR; INTRAVENOUS at 10:08

## 2024-08-30 RX ADMIN — PROPOFOL 250 MG: 10 INJECTION, EMULSION INTRAVENOUS at 10:05

## 2024-08-30 RX ADMIN — ROCURONIUM BROMIDE 50 MG: 10 INJECTION INTRAVENOUS at 10:05

## 2024-08-30 RX ADMIN — SUGAMMADEX 200 MG: 100 INJECTION, SOLUTION INTRAVENOUS at 10:40

## 2024-08-30 RX ADMIN — LIDOCAINE HYDROCHLORIDE 1 EACH: 40 SOLUTION TOPICAL at 10:06

## 2024-08-30 RX ADMIN — SODIUM CHLORIDE, POTASSIUM CHLORIDE, SODIUM LACTATE AND CALCIUM CHLORIDE 20 ML/HR: 600; 310; 30; 20 INJECTION, SOLUTION INTRAVENOUS at 09:51

## 2024-08-30 NOTE — ANESTHESIA PREPROCEDURE EVALUATION
Anesthesia Evaluation     Patient summary reviewed and Nursing notes reviewed   NPO Solid Status: > 8 hours  NPO Liquid Status: > 2 hours           Airway   Mallampati: III  TM distance: >3 FB  Neck ROM: full  Possible difficult intubation  Dental      Pulmonary    (+) ,sleep apnea on CPAP  (-) asthma, shortness of breath, recent URI, not a smoker    ROS comment: RUL Nodule    Cardiovascular     ECG reviewed    (+) hypertension, valvular problems/murmurs murmur  (-) past MI, dysrhythmias, angina, cardiac stents    ROS comment: ECG SR SA   GXT  Negative clinical evidence of myocardial ischemia.  ·  Findings consistent with a normal ECG stress test.      Neuro/Psych  (-) seizures, CVA  GI/Hepatic/Renal/Endo    (+) obesity, hiatal hernia, GERD, liver disease fatty liver disease, diabetes mellitus (A1C 6) type 2 well controlled, thyroid problem hypothyroidism  (-) morbid obesity    Musculoskeletal     (+) back pain  Abdominal    Substance History      OB/GYN          Other                      Anesthesia Plan    ASA 2     general     intravenous induction     Anesthetic plan, risks, benefits, and alternatives have been provided, discussed and informed consent has been obtained with: patient.    Plan discussed with CRNA.      CODE STATUS:

## 2024-08-30 NOTE — ANESTHESIA PROCEDURE NOTES
Airway  Urgency: elective    Date/Time: 8/30/2024 10:06 AM  Airway not difficult    General Information and Staff    Patient location during procedure: OR  CRNA/CAA: Cynthia Kitchen CRNA    Indications and Patient Condition  Indications for airway management: airway protection    Preoxygenated: yes  MILS not maintained throughout  Mask difficulty assessment: 1 - vent by mask    Final Airway Details  Final airway type: endotracheal airway      Successful airway: ETT  Cuffed: yes   Successful intubation technique: direct laryngoscopy and video laryngoscopy  Endotracheal tube insertion site: oral  Blade: Ortega  Blade size: 3  ETT size (mm): 8.5  Cormack-Lehane Classification: grade I - full view of glottis  Placement verified by: chest auscultation and capnometry   Cuff volume (mL): 8  Measured from: lips  ETT/EBT  to lips (cm): 22  Number of attempts at approach: 1  Assessment: lips, teeth, and gum same as pre-op and atraumatic intubation    Additional Comments  Negative epigastric sounds, Breath sound equal bilaterally with symmetric chest rise and fall

## 2024-08-30 NOTE — OP NOTE
"Bronchoscopy Procedure Note    Pre-op Diagnosis: Hypermetabolic right upper lobe lesion    Post-op Diagnosis: Same.  Visible endobronchial tumor.  Mobile, \"glistening\", very smooth-walled    Surgeon: Christian Condon MD    Anesthesia: General    Operation: Flexible fiberoptic bronchoscopy    Findings: Minimal secretions.  There was a endobronchial tumor that appeared to be a pedunculated nodule that was very mobile, smooth, glistening walls suggestive of a carcinoid    Specimen(s): Transbronchial needle aspiration, transbronchial biopsy, brush, and BAL right upper lobe    Estimated Blood Loss: Minimal/None    Complications: No immediate.  Chest x-ray pending at the time of this dictation    Indications and History:  Maria Dolores Sandoval is a 59 y.o. female  with a hypermetabolic right upper lobe lesion.  The risks, benefits, complications, treatment options and expected outcomes were discussed with the patient.  The possibilities of reaction to medication, pulmonary aspiration, perforation of a viscus, bleeding, collapsed lung requiring chest tube and hospitalization, failure to diagnose a condition and creating a complication requiring transfusion or operation were discussed with the patient who freely signed the consent.      Description of Procedure:  The patient was seen in the Holding Room and an immediate patient assessment was done prior to the administration of moderate and/or deep conscious sedation.  The patient was taken to the Endoscopy Suite, identified as Maria Dolores Sandoval  and the procedure verified as Flexible Fiberoptic Bronchoscopy.  A Time Out was held and the above information confirmed.    After the induction of general anesthesia the patient was intubated with an 8.5 endotracheal tube.    The bronchoscope was introduced via the endotracheal tube which confirmed good position in the distal one third of the trachea.    The scope was advanced in to the left mainstem bronchus.  The left upper lobe, " "lingula, left lower lobe, and superior segment left lower lobe revealed no discrete endobronchial lesions.    The scope was advanced into the right mainstem bronchus.  The right upper lobe, bronchus intermedius, right middle lobe, right lower lobe, and superior segment of the right lower lobe revealed no discrete endobronchial lesions.    The regular scope was removed and the navigational camera and catheter were introduced.  Registration was performed using the navigational software.    Then, the scope and camera were advanced out to the lesion using a previously plotted course.  There was a mobile, smooth walled, \"glistening\" tumor in the airway.  It was somewhat difficult to biopsy because it would move out of the way with the tools and it was hard to \"park\" the working channel directly on top of it without it moving.  Fluoroscopy was used under my direct supervision without a radiologist present for positioning the catheter and obtaining specimens.  Radial ultrasound was utilized to help identify the location of best concentric signal.  Multiple passes were taken with a 19-gauge needle and 21-gauge needle, multiple transbronchial biopsies, a cytology brushing specimen and a BAL with 60 mL instilled and 30 mL returned.    The navigational equipment was removed the regular scope was reintroduced.  The airways were suctioned clear and at the end of the procedure there was no significant residual blood and no evidence of active bleeding.  The scope was withdrawn without difficulty.    Fluoroscopy    Fluoroscopy was performed under my direct supervision without a radiologist present for obtaining transbronchial biopsy and brushing specimens.  Fluoroscopy was used at the end the procedure to exclude a pneumothorax.  None was identified.  Less than 7 minutes of fluoroscopy time was used in total.    The Patient was taken to the Endoscopy Recovery area in satisfactory condition.    Attestation: I performed the " procedure    Christian Condon MD, [facility]

## 2024-08-30 NOTE — INTERVAL H&P NOTE
The patient is referred for a navigational bronchoscopy for a small hypermetabolic lung lesion.  We discussed her chest imaging and the procedure in detail.  We discussed the risks which are not limited to the risk of pneumothorax which if it did occur would require hospitalization and could require chest tube placement and resulted in a prolonged hospital stay.      H&P reviewed. The patient was examined and there are no changes to the H&P.

## 2024-08-30 NOTE — ANESTHESIA POSTPROCEDURE EVALUATION
Patient: Maria Dolores Sandoval    Procedure Summary       Date: 08/30/24 Room / Location:  [facility]    Anesthesia Start: 0959 Anesthesia Stop: 1054    Procedure: BRONCHOSCOPY NAVIGATION WITH ENDOBRONCHIAL ULTRASOUND AND ION ROBOT Diagnosis:       Incidental lung nodule, greater than or equal to 8mm      (Incidental lung nodule, greater than or equal to 8mm [R91.1])    Surgeons: Christian Condon MD Provider: Demetris Rivas MD    Anesthesia Type: general ASA Status: 2            Anesthesia Type: general    Vitals  Vitals Value Taken Time   /85 08/30/24 1057   Temp 97.4 °F (36.3 °C) 08/30/24 1057   Pulse 87 08/30/24 1057   Resp 15 08/30/24 1057   SpO2 97 % 08/30/24 1057           Post Anesthesia Care and Evaluation    Patient location during evaluation: PACU  Patient participation: complete - patient participated  Level of consciousness: awake and alert  Pain management: adequate    Airway patency: patent  Anesthetic complications: No anesthetic complications  PONV Status: none  Cardiovascular status: hemodynamically stable and acceptable  Respiratory status: nonlabored ventilation, acceptable and face mask  Hydration status: acceptable

## 2024-08-31 LAB — NIGHT BLUE STAIN TISS: NORMAL

## 2024-09-01 LAB
BACTERIA SPEC RESP CULT: NORMAL
GRAM STN SPEC: NORMAL

## 2024-09-03 LAB — GIE STN SPEC: NORMAL

## 2024-09-04 LAB
CYTO UR: NORMAL
LAB AP CASE REPORT: NORMAL
LAB AP CLINICAL INFORMATION: NORMAL
LAB AP DIAGNOSIS COMMENT: NORMAL
LAB AP SPECIAL STAINS: NORMAL
PATH REPORT.FINAL DX SPEC: NORMAL
PATH REPORT.GROSS SPEC: NORMAL
REF LAB TEST METHOD: NORMAL

## 2024-09-05 ENCOUNTER — PATIENT OUTREACH (OUTPATIENT)
Dept: ONCOLOGY | Facility: CLINIC | Age: 59
End: 2024-09-05
Payer: COMMERCIAL

## 2024-09-05 DIAGNOSIS — C7A.090 MALIGNANT CARCINOID TUMOR OF LUNG: Primary | ICD-10-CM

## 2024-09-06 LAB
FUNGUS WND CULT: NORMAL
MYCOBACTERIUM SPEC CULT: NORMAL
NIGHT BLUE STAIN TISS: NORMAL

## 2024-09-13 LAB
FUNGUS WND CULT: NORMAL
MYCOBACTERIUM SPEC CULT: NORMAL
NIGHT BLUE STAIN TISS: NORMAL

## 2024-09-16 NOTE — H&P (VIEW-ONLY)
[facility] Cardiothoracic Surgery New Patient Office Note     Date of Encounter: 2024     Name: Maria Dolores Sandoval  : 1965     Referred By: Enedina Sepulveda APRN  PCP: Christiano Lamb DO    Chief Complaint:    Chief Complaint   Patient presents with    Consult     Dr. Condon (pulmonary) with recent diagnosis of carcinoid tumor from RUL nodule.         Subjective      History of Present Illness:    Maria Dolores Sandoval is a 59 y.o. female referred to Dr. Rao per Pulmonology Dr. Marcelo Condon/Enedina GANT for consideration surgical resection right upper lobe neuroendocrine tumor.  Tumor was incidentally noted on imaging @ ER visit 24 during chest pain evaluation.  PMH: Lifelong non-smoker,  MELISA on CPAP, type 2 DM, RLS, HTN, GERD, fatty liver disease, hypothyroid, obesity, and RUL neuroendocrine tumor.  NM PET 2024 demonstrated 1.6 cm RUL nodule with SUV 5.53.  She underwent bronchoscopy 2024.  Tissue sampling confirmed well differentiated neuroendocrine tumor.  Current symptoms include cough, chest heaviness, and fatigue.  Recent weight loss has been intentional with diet/exercise.      Review of Systems:  Review of Systems   Constitutional: Positive for weight loss (50 lb intentional wt loss over ). Negative for chills, decreased appetite, diaphoresis, fever, malaise/fatigue, night sweats and weight gain.   HENT:  Positive for hoarse voice (with overuse).    Eyes: Negative.  Negative for blurred vision, double vision and visual disturbance.   Cardiovascular:  Positive for chest pain (heaviness in middle of chest) and dyspnea on exertion (deconditioning). Negative for claudication, irregular heartbeat, leg swelling, near-syncope, orthopnea, palpitations, paroxysmal nocturnal dyspnea and syncope.   Respiratory:  Positive for cough (tickle from drainage). Negative for hemoptysis, shortness of breath, sputum production and wheezing.    Hematologic/Lymphatic:  Negative.  Negative for adenopathy and bleeding problem. Does not bruise/bleed easily.   Skin: Negative.  Negative for color change, nail changes, poor wound healing and rash.   Musculoskeletal:  Positive for neck pain (chronic). Negative for back pain, falls and muscle cramps.   Gastrointestinal:  Positive for diarrhea and heartburn. Negative for abdominal pain and dysphagia.   Genitourinary: Negative.  Negative for flank pain.   Neurological:  Positive for dizziness (unsteadiness) and numbness (decreased sensation in fingertips and BLE -minimal). Negative for brief paralysis, disturbances in coordination, focal weakness, headaches, light-headedness, loss of balance, paresthesias, sensory change, vertigo and weakness.   Psychiatric/Behavioral:  Negative for depression and suicidal ideas. The patient is nervous/anxious (due to recent testing and results).    Allergic/Immunologic: Negative for persistent infections.       I have reviewed the following portions of the patient's history: problem list, current medications, allergies, past surgical history, past medical history, past social history, past family history, and ROS and confirm it's accurate.    Allergies:  Allergies   Allergen Reactions    Codeine GI Intolerance     vomiting    Adhesive Tape Unknown - Low Severity     Surgical tape and bandaids adhesives   - long exposure     Chlorhexidine Unknown - Low Severity     Possible-  had reaction to bandaid after surgery dosent know if prep caused issues also or not     Phenyleph-Dexchlorphen-Codeine Other (See Comments)     unknown       Medications:      Current Outpatient Medications:     buPROPion XL (WELLBUTRIN XL) 150 MG 24 hr tablet, Take 1 tablet by mouth Every Evening., Disp: , Rfl:     esomeprazole (nexIUM) 20 MG capsule, Take 1 capsule by mouth Every Morning Before Breakfast., Disp: , Rfl:     levothyroxine (SYNTHROID, LEVOTHROID) 112 MCG tablet, Take 1 tablet by mouth Every Night., Disp: , Rfl:      lisinopril (PRINIVIL,ZESTRIL) 2.5 MG tablet, 1 tablet Every Night., Disp: , Rfl:     OneTouch Ultra test strip, , Disp: , Rfl:     omeprazole (priLOSEC) 20 MG capsule, Take 1 capsule by mouth Daily. (Patient not taking: Reported on 9/17/2024), Disp: , Rfl:     History:   Past Medical History:   Diagnosis Date    Arthritis     Back pain     Depression     Diabetes mellitus     no meds currently; checks blood sugars at home occasioannly    Diarrhea following gastrointestinal surgery     since cholecystectomy     Disease of thyroid gland     hypothyroidism     [facility]    Fatty liver     GERD (gastroesophageal reflux disease)     Headache     Hiatal hernia     still present    History of anemia     due to heavy periods     History of depression     History of migraine     early adulthood after a concussion-none recently     Hypertension     Insomnia     Murmur     dx cardiologist heard    Polycystic disease, ovaries     Presenile cataract     not ripe yet    Sleep apnea with use of continuous positive airway pressure (CPAP)     auto range of 6-16    Wears glasses        Past Surgical History:   Procedure Laterality Date    BRONCHOSCOPY WITH ION ROBOTIC ASSIST N/A 08/30/2024    Procedure: BRONCHOSCOPY NAVIGATION WITH ENDOBRONCHIAL ULTRASOUND AND ION ROBOT;  Surgeon: Christian Condon MD;  Location: [facility] ENDOSCOPY;  Service: Robotics - Pulmonary;  Laterality: N/A;    CHOLECYSTECTOMY  2014    COLONOSCOPY  2017    polyps remoed     D & C HYSTEROSCOPY ENDOMETRIAL ABLATION N/A 08/30/2017    Procedure: HYSTEROSCOPY DILATATION AND CURETTAGE, ENDOMETRIAL ABLATION, POSSIBLE REMOVAL OF LEIOMYOMATA;  Surgeon: Veronica Riley MD;  Location:  [facility];  Service:     D & C HYSTEROSCOPY MYOSURE N/A 08/30/2017    Procedure: DILATATION AND CURETTAGE HYSTEROSCOPY WITH MYOSURE;  Surgeon: Veronica Riley MD;  Location:  [facility];  Service:     DIAGNOSTIC LAPAROSCOPY  2007     "removed uterine septum     ENDOSCOPY      HERNIA REPAIR  2014    no mesh used- incisional hernia- mesh in place    LIVER BIOPSY  2012    TOTAL LAPAROSCOPIC HYSTERECTOMY N/A 11/03/2017    Procedure: TOTAL LAPAROSCOPIC HYSTERECTOMY, BILATERAL SALPINGO OOPHORECTOMY WITH DAVINCI ROBOT;  Surgeon: Ni Faulkner MD;  Location: [facility];  Service:     UMBILICAL HERNIA REPAIR      mesh in place    VAGINA SURGERY      removal of polyps    WISDOM TOOTH EXTRACTION         Social History     Socioeconomic History    Marital status:     Number of children: 1   Tobacco Use    Smoking status: Never    Smokeless tobacco: Never   Vaping Use    Vaping status: Never Used   Substance and Sexual Activity    Alcohol use: No    Drug use: No    Sexual activity: Defer        Family History   Problem Relation Age of Onset    Diabetes Mother     Heart disease Mother     Cirrhosis Father     Ovarian cancer Sister 30        half sister    Colon cancer Maternal Uncle     Breast cancer Paternal Aunt 69       Objective   Physical Exam:  Vitals:    09/17/24 1432   BP: 142/84   BP Location: Right arm   Patient Position: Sitting   Pulse: 88   Temp: 97.3 °F (36.3 °C)   SpO2: 98%   Weight: 102 kg (224 lb 12.8 oz)   Height: 165.1 cm (65\")  Comment: pt reported      Body mass index is 37.41 kg/m².    Physical Exam  Vitals reviewed.   Constitutional:       General: She is not in acute distress.     Appearance: She is well-developed and well-groomed. She is obese. She is not toxic-appearing.   HENT:      Head: Normocephalic and atraumatic.   Eyes:      General: Lids are normal.      Conjunctiva/sclera: Conjunctivae normal.      Pupils: Pupils are equal, round, and reactive to light.   Neck:      Vascular: No carotid bruit or JVD.   Cardiovascular:      Rate and Rhythm: Normal rate and regular rhythm.      Pulses:           Radial pulses are 2+ on the right side and 2+ on the left side.        Dorsalis pedis pulses are 2+ on the right side and 2+ " on the left side.        Posterior tibial pulses are 2+ on the right side and 2+ on the left side.      Heart sounds: S1 normal and S2 normal. No murmur heard.  Pulmonary:      Effort: Pulmonary effort is normal. No respiratory distress.      Breath sounds: Normal breath sounds.   Musculoskeletal:         General: Normal range of motion.      Cervical back: Normal range of motion and neck supple.      Right lower leg: No edema.      Left lower leg: No edema.   Lymphadenopathy:      Cervical: No cervical adenopathy.      Upper Body:      Right upper body: No supraclavicular or axillary adenopathy.      Left upper body: No supraclavicular or axillary adenopathy.   Skin:     General: Skin is warm and dry.      Capillary Refill: Capillary refill takes less than 2 seconds.   Neurological:      General: No focal deficit present.      Mental Status: She is alert and oriented to person, place, and time.      GCS: GCS eye subscore is 4. GCS verbal subscore is 5. GCS motor subscore is 6.   Psychiatric:         Attention and Perception: Attention normal.         Mood and Affect: Mood normal.         Speech: Speech normal.         Behavior: Behavior is cooperative.         Thought Content: Thought content normal.         Cognition and Memory: Cognition normal.         Judgment: Judgment normal.         Imaging/Labs:  Brain MRI: pending/scheduled 9/23/2024      CT Chest Without Contrast Diagnostic: 8/29/2024 (Personally reviewed)  Impression: 1.7 x 1.3 cm ovoid nodule in the right upper lobe. Please see PET scan report 8/16/2024. 2. Additional findings as given above.  Electronically Signed: Janes Mesa MD  8/29/2024 9:03 AM EDT            Bronchoscopy Pathology 8/30/2024  Clinical Information    Incidental lung nodule, greater than or equal to 8mm   Final Diagnosis   Lung, right upper lobe, TB BX:  Well-differentiated neuroendocrine tumor/carcinoid tumor, see comment       Electronically signed by Milka Walton DO on  9/4/2024 at 0945     NM PET/CT SKULL BASE TO MID THIGH: 8/16/2024  (Personally reviewed and agree w/ Radiology assessment)  Findings:   HEAD AND NECK:   No abnormal FDG accumulation is seen within the head or neck. No adenopathy is seen.   CHEST: 1.6 cm noncalcified nodule in the central right upper lobe is hypermetabolic with SUV max 5.53 (image 115), and is suspicious for malignancy. No additional pulmonary nodules are identified. No pathologically enlarged or hypermetabolic lymph nodes   are evident. Mild scarring in the inferior lingular segment left upper lobe.   ABDOMEN AND PELVIS:   No abnormal radiopharmaceutical uptake is seen within the abdomen or pelvis. No soft tissue mass, adenopathy or ascites is seen. Hysterectomy. Mild uncomplicated colonic diverticulosis. Cholecystectomy. Noncontrast appearance of the liver, spleen,   pancreas, adrenals, kidneys is within normal limits. Urinary bladder and rectum are normal. Hysterectomy.   SKELETAL:  No abnormal FDG accumulation within the skeleton. No suspicious osteolytic or osteoblastic lesions identified.   IMPRESSION:  1. 1.6 cm right upper lobe nodule is hypermetabolic and suspicious for malignancy. Soft tissue sampling is suggested.   Electronically Signed: Adriana George MD 8/20/2024 12:06 PM EDT     CT CHEST W CONTRAST DIAGNOSTIC: 7/29/2024  (Personally reviewed and agree w/ Radiology assessment)  IMPRESSION:   1. 1.6 cm right upper lobe nodule. The lesion does not have overtly suspicious characteristics. Depending on patient risk factors, consider 3-month follow-up chest CT, PET/CT, or tissue diagnosis.  2. No acute process demonstrated.   Electronically Signed: José Miguel Hill  7/29/2024 3:20 PM EDT     Assessment / Plan      Assessment / Plan:  1. Carcinoid tumor RUL  - 59 y.o. female referred to Dr. Rao per Pulmonology Dr. Marcelo Condon/Enedina GANT for consideration surgical resection right upper lobe neuroendocrine tumor.    - PMH: Lifelong  non-smoker, MELISA on CPAP, type 2 DM, RLS, HTN, GERD, fatty liver disease, hypothyroid, obesity, and RUL neuroendocrine tumor.   - NM PET 8/16/2024 demonstrated 1.6 cm RUL nodule with SUV 5.53.    - Bronchoscopy 8/30/2024: Tissue sampling confirmed well differentiated neuroendocrine tumor.   - PFT 8/6/2024: normal  - MRI brain: pending/scheduled 9/23/24  - Symptoms: cough, chest heaviness, and fatigue.    - Reviewed pathology and PET imaging.  Discussed robotic VATS RUL wedge resection/possible right upper lobectomy with MLND procedure details, post op expectations and risks (including death, MI, CVA, anesthesia complications, pain, infection, bleeding, blood clots).  Ms. Sandoval is a reasonable candidate for robotic VATS RUL wedge vs RUL lobectomy and wishes to proceed.  Patient is agreeable to meet Dr. Rao in pre-op room.    - Will plan to see patient back in clinic 6 weeks post op    Patient Education: see surgical risk discussion above      Follow Up:   Return in about 6 weeks (around 10/29/2024) for post op Robotic VATS for right upper lobe wedge resection poss right upper lobectomy wMLND per [facility].   Or sooner for any further concerns or worsening sign and symptoms. If unable to reach us in the office please dial 911 or go to the nearest emergency department.      STALIN Rodriguez  [facility] Cardiothoracic Surgery    Time Spent: I spent 50 minutes caring for Maria Dolores on this date of service. This time includes time spent by me in the following activities: preparing for the visit, reviewing tests, obtaining and/or reviewing a separately obtained history, performing a medically appropriate examination and/or evaluation, counseling and educating the patient/family/caregiver, ordering medications, tests, or procedures, documenting information in the medical record, independently interpreting results and communicating that information with the patient/family/caregiver, and care coordination.

## 2024-09-17 ENCOUNTER — OFFICE VISIT (OUTPATIENT)
Dept: CARDIAC SURGERY | Facility: CLINIC | Age: 59
End: 2024-09-17
Payer: COMMERCIAL

## 2024-09-17 VITALS
DIASTOLIC BLOOD PRESSURE: 84 MMHG | HEIGHT: 65 IN | HEART RATE: 88 BPM | TEMPERATURE: 97.3 F | OXYGEN SATURATION: 98 % | SYSTOLIC BLOOD PRESSURE: 142 MMHG | BODY MASS INDEX: 37.45 KG/M2 | WEIGHT: 224.8 LBS

## 2024-09-17 DIAGNOSIS — R91.1 INCIDENTAL LUNG NODULE, GREATER THAN OR EQUAL TO 8MM: Primary | ICD-10-CM

## 2024-09-17 PROCEDURE — 99214 OFFICE O/P EST MOD 30 MIN: CPT | Performed by: NURSE PRACTITIONER

## 2024-09-18 ENCOUNTER — PREP FOR SURGERY (OUTPATIENT)
Dept: CARDIAC SURGERY | Facility: CLINIC | Age: 59
End: 2024-09-18
Payer: COMMERCIAL

## 2024-09-18 DIAGNOSIS — R91.1 INCIDENTAL LUNG NODULE, GREATER THAN OR EQUAL TO 8MM: Primary | ICD-10-CM

## 2024-09-18 DIAGNOSIS — C34.91 MALIGNANT NEOPLASM OF RIGHT LUNG, UNSPECIFIED PART OF LUNG: Primary | ICD-10-CM

## 2024-09-20 ENCOUNTER — PATIENT ROUNDING (BHMG ONLY) (OUTPATIENT)
Dept: CARDIAC SURGERY | Facility: CLINIC | Age: 59
End: 2024-09-20
Payer: COMMERCIAL

## 2024-09-20 LAB
FUNGUS WND CULT: NORMAL
MYCOBACTERIUM SPEC CULT: NORMAL
NIGHT BLUE STAIN TISS: NORMAL

## 2024-09-20 RX ORDER — KETOROLAC TROMETHAMINE 15 MG/ML
30 INJECTION, SOLUTION INTRAMUSCULAR; INTRAVENOUS ONCE
OUTPATIENT
Start: 2024-09-20 | End: 2024-09-20

## 2024-09-20 RX ORDER — CHLORHEXIDINE GLUCONATE 500 MG/1
CLOTH TOPICAL EVERY 12 HOURS PRN
OUTPATIENT
Start: 2024-09-27

## 2024-09-20 RX ORDER — GABAPENTIN 100 MG/1
100 CAPSULE ORAL ONCE
OUTPATIENT
Start: 2024-09-20 | End: 2024-09-20

## 2024-09-20 RX ORDER — GABAPENTIN 100 MG/1
100 CAPSULE ORAL ONCE
OUTPATIENT
Start: 2024-09-30 | End: 2024-09-30

## 2024-09-23 ENCOUNTER — HOSPITAL ENCOUNTER (OUTPATIENT)
Dept: MRI IMAGING | Facility: HOSPITAL | Age: 59
Discharge: HOME | End: 2024-09-23
Admitting: NURSE PRACTITIONER
Payer: COMMERCIAL

## 2024-09-23 DIAGNOSIS — C7A.090 MALIGNANT CARCINOID TUMOR OF LUNG: ICD-10-CM

## 2024-09-23 PROCEDURE — 70553 MRI BRAIN STEM W/O & W/DYE: CPT

## 2024-09-23 PROCEDURE — A9577 INJ MULTIHANCE: HCPCS | Performed by: NURSE PRACTITIONER

## 2024-09-23 PROCEDURE — 0 GADOBENATE DIMEGLUMINE 529 MG/ML SOLUTION: Performed by: NURSE PRACTITIONER

## 2024-09-23 RX ADMIN — GADOBENATE DIMEGLUMINE 20 ML: 529 INJECTION, SOLUTION INTRAVENOUS at 07:56

## 2024-09-27 ENCOUNTER — PRE-ADMISSION TESTING (OUTPATIENT)
Dept: PREADMISSION TESTING | Facility: HOSPITAL | Age: 59
DRG: 165 | End: 2024-09-27
Payer: COMMERCIAL

## 2024-09-27 ENCOUNTER — HOSPITAL ENCOUNTER (OUTPATIENT)
Dept: PULMONOLOGY | Facility: HOSPITAL | Age: 59
Discharge: HOME | End: 2024-09-27
Payer: COMMERCIAL

## 2024-09-27 ENCOUNTER — HOSPITAL ENCOUNTER (OUTPATIENT)
Dept: GENERAL RADIOLOGY | Facility: HOSPITAL | Age: 59
Discharge: HOME | End: 2024-09-27
Payer: COMMERCIAL

## 2024-09-27 VITALS — BODY MASS INDEX: 37.43 KG/M2 | OXYGEN SATURATION: 94 % | WEIGHT: 224.65 LBS | HEIGHT: 65 IN

## 2024-09-27 DIAGNOSIS — C34.91 MALIGNANT NEOPLASM OF RIGHT LUNG, UNSPECIFIED PART OF LUNG: ICD-10-CM

## 2024-09-27 LAB
ABO GROUP BLD: NORMAL
ALBUMIN SERPL-MCNC: 4.2 G/DL (ref 3.5–5.2)
ALP SERPL-CCNC: 86 U/L (ref 39–117)
ALT SERPL W P-5'-P-CCNC: 18 U/L (ref 1–33)
ANION GAP SERPL CALCULATED.3IONS-SCNC: 11 MMOL/L (ref 5–15)
AST SERPL-CCNC: 29 U/L (ref 1–32)
BASOPHILS # BLD AUTO: 0.06 10*3/MM3 (ref 0–0.2)
BASOPHILS NFR BLD AUTO: 1 % (ref 0–1.5)
BILIRUB CONJ SERPL-MCNC: 0.2 MG/DL (ref 0–0.3)
BILIRUB INDIRECT SERPL-MCNC: 0.2 MG/DL
BILIRUB SERPL-MCNC: 0.4 MG/DL (ref 0–1.2)
BLD GP AB SCN SERPL QL: NEGATIVE
BUN SERPL-MCNC: 12 MG/DL (ref 6–20)
BUN/CREAT SERPL: 12 (ref 7–25)
CALCIUM SPEC-SCNC: 8.5 MG/DL (ref 8.6–10.5)
CHLORIDE SERPL-SCNC: 104 MMOL/L (ref 98–107)
CO2 SERPL-SCNC: 25 MMOL/L (ref 22–29)
CREAT SERPL-MCNC: 1 MG/DL (ref 0.57–1)
DEPRECATED RDW RBC AUTO: 43.9 FL (ref 37–54)
EGFRCR SERPLBLD CKD-EPI 2021: 65 ML/MIN/1.73
EOSINOPHIL # BLD AUTO: 0.29 10*3/MM3 (ref 0–0.4)
EOSINOPHIL NFR BLD AUTO: 5 % (ref 0.3–6.2)
ERYTHROCYTE [DISTWIDTH] IN BLOOD BY AUTOMATED COUNT: 14 % (ref 12.3–15.4)
FUNGUS WND CULT: NORMAL
GLUCOSE SERPL-MCNC: 89 MG/DL (ref 65–99)
HBA1C MFR BLD: 5.4 % (ref 4.8–5.6)
HCT VFR BLD AUTO: 42.7 % (ref 34–46.6)
HGB BLD-MCNC: 13.9 G/DL (ref 12–15.9)
IMM GRANULOCYTES # BLD AUTO: 0.02 10*3/MM3 (ref 0–0.05)
IMM GRANULOCYTES NFR BLD AUTO: 0.3 % (ref 0–0.5)
INR PPP: 0.94 (ref 0.89–1.12)
LYMPHOCYTES # BLD AUTO: 1.34 10*3/MM3 (ref 0.7–3.1)
LYMPHOCYTES NFR BLD AUTO: 23.1 % (ref 19.6–45.3)
MCH RBC QN AUTO: 28 PG (ref 26.6–33)
MCHC RBC AUTO-ENTMCNC: 32.6 G/DL (ref 31.5–35.7)
MCV RBC AUTO: 86.1 FL (ref 79–97)
MONOCYTES # BLD AUTO: 0.55 10*3/MM3 (ref 0.1–0.9)
MONOCYTES NFR BLD AUTO: 9.5 % (ref 5–12)
MYCOBACTERIUM SPEC CULT: NORMAL
NEUTROPHILS NFR BLD AUTO: 3.54 10*3/MM3 (ref 1.7–7)
NEUTROPHILS NFR BLD AUTO: 61.1 % (ref 42.7–76)
NIGHT BLUE STAIN TISS: NORMAL
NRBC BLD AUTO-RTO: 0 /100 WBC (ref 0–0.2)
PLATELET # BLD AUTO: 263 10*3/MM3 (ref 140–450)
PMV BLD AUTO: 9.2 FL (ref 6–12)
POTASSIUM SERPL-SCNC: 3.8 MMOL/L (ref 3.5–5.2)
PROT SERPL-MCNC: 6.8 G/DL (ref 6–8.5)
PROTHROMBIN TIME: 12.6 SECONDS (ref 12.2–14.5)
RBC # BLD AUTO: 4.96 10*6/MM3 (ref 3.77–5.28)
RH BLD: POSITIVE
SODIUM SERPL-SCNC: 140 MMOL/L (ref 136–145)
T&S EXPIRATION DATE: NORMAL
WBC NRBC COR # BLD AUTO: 5.8 10*3/MM3 (ref 3.4–10.8)

## 2024-09-27 PROCEDURE — 86850 RBC ANTIBODY SCREEN: CPT

## 2024-09-27 PROCEDURE — 36415 COLL VENOUS BLD VENIPUNCTURE: CPT

## 2024-09-27 PROCEDURE — 86923 COMPATIBILITY TEST ELECTRIC: CPT

## 2024-09-27 PROCEDURE — 71046 X-RAY EXAM CHEST 2 VIEWS: CPT

## 2024-09-27 PROCEDURE — 80048 BASIC METABOLIC PNL TOTAL CA: CPT

## 2024-09-27 PROCEDURE — 86901 BLOOD TYPING SEROLOGIC RH(D): CPT

## 2024-09-27 PROCEDURE — 85025 COMPLETE CBC W/AUTO DIFF WBC: CPT

## 2024-09-27 PROCEDURE — 86900 BLOOD TYPING SEROLOGIC ABO: CPT

## 2024-09-27 PROCEDURE — 80076 HEPATIC FUNCTION PANEL: CPT

## 2024-09-27 PROCEDURE — 85610 PROTHROMBIN TIME: CPT

## 2024-09-27 PROCEDURE — 94010 BREATHING CAPACITY TEST: CPT

## 2024-09-27 PROCEDURE — 83036 HEMOGLOBIN GLYCOSYLATED A1C: CPT

## 2024-09-27 RX ORDER — LEVOTHYROXINE SODIUM 100 UG/1
100 TABLET ORAL DAILY
Status: ON HOLD | COMMUNITY

## 2024-09-27 RX ORDER — CALCIUM CARBONATE 500 MG/1
1 TABLET, CHEWABLE ORAL AS NEEDED
Status: ON HOLD | COMMUNITY

## 2024-09-27 NOTE — PAT
EKG on chart from 7/29/24.     Bactroban to be applied to each nostril during PAT visit if surgery the following day.  If surgery NOT the following day, then Bactroban supplied to patient with instructions both written and verbally to insert Bactroban into each nares the night before surgery.    Per Anesthesia Request, patient instructed not to take their ACE/ARB medications on the AM of surgery.    Patient directed to Radiology Department for CXR and pft after Pre Admission Testing Appointment.     94% ra so abg not needed.     Patient to apply PURPLE wipes  to surgical area (as instructed) the night before procedure and the AM of procedure. Wipes provided.- D/T ALLERGY

## 2024-09-29 ENCOUNTER — ANESTHESIA EVENT (OUTPATIENT)
Dept: PERIOP | Facility: HOSPITAL | Age: 59
DRG: 165 | End: 2024-09-29
Payer: COMMERCIAL

## 2024-09-29 RX ORDER — FAMOTIDINE 10 MG/ML
20 INJECTION, SOLUTION INTRAVENOUS ONCE
Status: CANCELLED | OUTPATIENT
Start: 2024-09-29 | End: 2024-09-29

## 2024-09-29 RX ORDER — SODIUM CHLORIDE 0.9 % (FLUSH) 0.9 %
10 SYRINGE (ML) INJECTION EVERY 12 HOURS SCHEDULED
Status: CANCELLED | OUTPATIENT
Start: 2024-09-29

## 2024-09-30 ENCOUNTER — ANESTHESIA (OUTPATIENT)
Dept: PERIOP | Facility: HOSPITAL | Age: 59
DRG: 165 | End: 2024-09-30
Payer: COMMERCIAL

## 2024-09-30 ENCOUNTER — HOSPITAL ENCOUNTER (INPATIENT)
Facility: HOSPITAL | Age: 59
LOS: 3 days | Discharge: HOME | DRG: 165 | End: 2024-10-03
Attending: THORACIC SURGERY (CARDIOTHORACIC VASCULAR SURGERY) | Admitting: THORACIC SURGERY (CARDIOTHORACIC VASCULAR SURGERY)
Payer: COMMERCIAL

## 2024-09-30 DIAGNOSIS — R91.1 INCIDENTAL LUNG NODULE, GREATER THAN OR EQUAL TO 8MM: Primary | ICD-10-CM

## 2024-09-30 DIAGNOSIS — C34.91 MALIGNANT NEOPLASM OF RIGHT LUNG, UNSPECIFIED PART OF LUNG: ICD-10-CM

## 2024-09-30 DIAGNOSIS — R91.1 INCIDENTAL LUNG NODULE, GREATER THAN OR EQUAL TO 8MM: ICD-10-CM

## 2024-09-30 LAB — GLUCOSE BLDC GLUCOMTR-MCNC: 77 MG/DL (ref 70–130)

## 2024-09-30 PROCEDURE — 25810000003 LACTATED RINGERS PER 1000 ML: Performed by: ANESTHESIOLOGY

## 2024-09-30 PROCEDURE — 25010000002 KETOROLAC TROMETHAMINE PER 15 MG

## 2024-09-30 PROCEDURE — 82948 REAGENT STRIP/BLOOD GLUCOSE: CPT

## 2024-09-30 RX ORDER — LIDOCAINE HYDROCHLORIDE 10 MG/ML
0.5 INJECTION, SOLUTION EPIDURAL; INFILTRATION; INTRACAUDAL; PERINEURAL ONCE AS NEEDED
Status: COMPLETED | OUTPATIENT
Start: 2024-09-30 | End: 2024-09-30

## 2024-09-30 RX ORDER — SODIUM CHLORIDE 9 MG/ML
40 INJECTION, SOLUTION INTRAVENOUS AS NEEDED
Status: DISCONTINUED | OUTPATIENT
Start: 2024-09-30 | End: 2024-09-30 | Stop reason: HOSPADM

## 2024-09-30 RX ORDER — KETOROLAC TROMETHAMINE 30 MG/ML
30 INJECTION, SOLUTION INTRAMUSCULAR; INTRAVENOUS ONCE
Status: COMPLETED | OUTPATIENT
Start: 2024-09-30 | End: 2024-09-30

## 2024-09-30 RX ORDER — CHLORHEXIDINE GLUCONATE 500 MG/1
CLOTH TOPICAL EVERY 12 HOURS PRN
Status: DISCONTINUED | OUTPATIENT
Start: 2024-09-30 | End: 2024-09-30 | Stop reason: HOSPADM

## 2024-09-30 RX ORDER — SODIUM CHLORIDE 0.9 % (FLUSH) 0.9 %
10 SYRINGE (ML) INJECTION AS NEEDED
Status: DISCONTINUED | OUTPATIENT
Start: 2024-09-30 | End: 2024-09-30 | Stop reason: HOSPADM

## 2024-09-30 RX ORDER — LISINOPRIL 5 MG/1
2.5 TABLET ORAL
Status: DISCONTINUED | OUTPATIENT
Start: 2024-09-30 | End: 2024-10-03 | Stop reason: HOSPADM

## 2024-09-30 RX ORDER — FAMOTIDINE 20 MG/1
20 TABLET, FILM COATED ORAL ONCE
Status: DISCONTINUED | OUTPATIENT
Start: 2024-09-30 | End: 2024-09-30 | Stop reason: HOSPADM

## 2024-09-30 RX ORDER — LIDOCAINE HYDROCHLORIDE 10 MG/ML
0.5 INJECTION, SOLUTION EPIDURAL; INFILTRATION; INTRACAUDAL; PERINEURAL ONCE AS NEEDED
Status: DISCONTINUED | OUTPATIENT
Start: 2024-09-30 | End: 2024-09-30 | Stop reason: HOSPADM

## 2024-09-30 RX ORDER — BUPROPION HYDROCHLORIDE 150 MG/1
150 TABLET ORAL EVERY EVENING
Status: DISCONTINUED | OUTPATIENT
Start: 2024-09-30 | End: 2024-10-03 | Stop reason: HOSPADM

## 2024-09-30 RX ORDER — ACETAMINOPHEN 325 MG/1
650 TABLET ORAL EVERY 4 HOURS PRN
Status: DISCONTINUED | OUTPATIENT
Start: 2024-09-30 | End: 2024-10-03 | Stop reason: HOSPADM

## 2024-09-30 RX ORDER — FAMOTIDINE 20 MG/1
20 TABLET, FILM COATED ORAL ONCE
Status: COMPLETED | OUTPATIENT
Start: 2024-09-30 | End: 2024-09-30

## 2024-09-30 RX ORDER — GABAPENTIN 100 MG/1
100 CAPSULE ORAL ONCE
Status: DISCONTINUED | OUTPATIENT
Start: 2024-09-30 | End: 2024-09-30 | Stop reason: HOSPADM

## 2024-09-30 RX ORDER — GABAPENTIN 100 MG/1
100 CAPSULE ORAL ONCE
Status: COMPLETED | OUTPATIENT
Start: 2024-09-30 | End: 2024-09-30

## 2024-09-30 RX ORDER — SODIUM CHLORIDE, SODIUM LACTATE, POTASSIUM CHLORIDE, CALCIUM CHLORIDE 600; 310; 30; 20 MG/100ML; MG/100ML; MG/100ML; MG/100ML
9 INJECTION, SOLUTION INTRAVENOUS CONTINUOUS
Status: DISCONTINUED | OUTPATIENT
Start: 2024-09-30 | End: 2024-10-01

## 2024-09-30 RX ORDER — FAMOTIDINE 10 MG/ML
20 INJECTION, SOLUTION INTRAVENOUS ONCE
Status: DISCONTINUED | OUTPATIENT
Start: 2024-09-30 | End: 2024-09-30 | Stop reason: HOSPADM

## 2024-09-30 RX ORDER — LEVOTHYROXINE SODIUM 100 UG/1
100 TABLET ORAL DAILY
Status: DISCONTINUED | OUTPATIENT
Start: 2024-10-01 | End: 2024-10-03 | Stop reason: HOSPADM

## 2024-09-30 RX ORDER — MIDAZOLAM HYDROCHLORIDE 1 MG/ML
1 INJECTION INTRAMUSCULAR; INTRAVENOUS
Status: DISCONTINUED | OUTPATIENT
Start: 2024-09-30 | End: 2024-09-30 | Stop reason: HOSPADM

## 2024-09-30 RX ORDER — MIDAZOLAM HYDROCHLORIDE 1 MG/ML
1 INJECTION INTRAMUSCULAR; INTRAVENOUS
Status: DISCONTINUED | OUTPATIENT
Start: 2024-09-30 | End: 2024-09-30 | Stop reason: SDUPTHER

## 2024-09-30 RX ORDER — SODIUM CHLORIDE 0.9 % (FLUSH) 0.9 %
10 SYRINGE (ML) INJECTION EVERY 12 HOURS SCHEDULED
Status: DISCONTINUED | OUTPATIENT
Start: 2024-09-30 | End: 2024-09-30 | Stop reason: HOSPADM

## 2024-09-30 RX ADMIN — BUPROPION HYDROCHLORIDE 150 MG: 150 TABLET, EXTENDED RELEASE ORAL at 20:55

## 2024-09-30 RX ADMIN — MUPIROCIN 1 APPLICATION: 20 OINTMENT TOPICAL at 11:44

## 2024-09-30 RX ADMIN — LISINOPRIL 2.5 MG: 2.5 TABLET ORAL at 20:57

## 2024-09-30 RX ADMIN — FAMOTIDINE 20 MG: 20 TABLET, FILM COATED ORAL at 11:34

## 2024-09-30 RX ADMIN — GABAPENTIN 100 MG: 100 CAPSULE ORAL at 11:44

## 2024-09-30 RX ADMIN — LIDOCAINE HYDROCHLORIDE 0.5 ML: 10 INJECTION, SOLUTION EPIDURAL; INFILTRATION; INTRACAUDAL; PERINEURAL at 11:34

## 2024-09-30 RX ADMIN — KETOROLAC TROMETHAMINE 30 MG: 30 INJECTION, SOLUTION INTRAMUSCULAR; INTRAVENOUS at 11:44

## 2024-09-30 RX ADMIN — SODIUM CHLORIDE, POTASSIUM CHLORIDE, SODIUM LACTATE AND CALCIUM CHLORIDE 9 ML/HR: 600; 310; 30; 20 INJECTION, SOLUTION INTRAVENOUS at 11:34

## 2024-09-30 RX ADMIN — ACETAMINOPHEN 650 MG: 325 TABLET ORAL at 23:58

## 2024-09-30 NOTE — INTERVAL H&P NOTE
"[facility] Pre-op    Full history and physical note from office is attached.    /90 (BP Location: Right arm, Patient Position: Lying)   Pulse 74   Temp 97 °F (36.1 °C) (Temporal)   Resp 18   Ht 165.1 cm (65\")   Wt 102 kg (224 lb 13.9 oz)   LMP 10/26/2017   SpO2 97%   BMI 37.42 kg/m²     Immunizations:  Influenza:  UTD  Pneumococcal:  UTD  Tetanus:  UTD    Review of Systems:  Constitutional-- No fever, chills or sweats. No fatigue.  CV-- No chest pain, palpitation or syncope  Resp-- No SOB, cough, hemoptysis  Skin--No rashes or lesions    Physical Exam:  Heart:   Regular rate and rhythm, S1 and S2 normal  Lungs: Clear to auscultation bilaterally, respirations unlabored    LAB Results:  Lab Results   Component Value Date    WBC 5.80 09/27/2024    HGB 13.9 09/27/2024    HCT 42.7 09/27/2024    MCV 86.1 09/27/2024     09/27/2024    NEUTROABS 3.54 09/27/2024    GLUCOSE 89 09/27/2024    BUN 12 09/27/2024    CREATININE 1.00 09/27/2024    EGFRIFNONA 75 11/04/2017     09/27/2024    K 3.8 09/27/2024     09/27/2024    CO2 25.0 09/27/2024    MG 2.0 07/29/2024    CALCIUM 8.5 (L) 09/27/2024    ALBUMIN 4.2 09/27/2024    AST 29 09/27/2024    ALT 18 09/27/2024    BILITOT 0.4 09/27/2024    PTT 31.1 08/26/2024    INR 0.94 09/27/2024     Clinical Information    Incidental lung nodule, greater than or equal to 8mm   Final Diagnosis   Lung, right upper lobe, TB BX:  Well-differentiated neuroendocrine tumor/carcinoid tumor, see comment       Electronically signed by Milka Walton DO on 9/4/2024 at 0945   Comment    Histologic sections show sheets of a bland epithelioid proliferation underlying benign bronchial epithelium.  No necrosis or increased mitoses are identified.  The overall morphology and immunophenotype are compatible with well-differentiated neuroendocrine tumor/carcinoid tumor, WHO grade 1.    Gross Description    1. Lung, Right Upper Lobe.  Received in formalin labeled \"RUL " "TB BX for pathology\", is a 1.3 x 0.6 x 0.2 cm aggregate of pink-red, ragged, minute soft tissue fragments.  The specimen is filtered and entirely submitted as received in cassette 1A. [facility]     Cancer Staging (if applicable)  Cancer Patient: __ yes __no __unknown__N/A; If yes, clinical stage T:__ N:__M:__, stage group or __N/A      Impression: Incidental lung nodule, greater than or equal to 8mm       Plan: BRONCHOSCOPY; THORASCOPIC VIDEO ASSISTED RIGHT UPPER LOBE WEDGE RESECTION POSSIBLE RIGHT UPPER LOBECTOMY; MEDIASTINAL LYMPH NODE DISSECTION WITH DAVINCI ROBOT       Hanny Mujica, STALIN   9/30/2024   11:36 EDT  "

## 2024-09-30 NOTE — ANESTHESIA PREPROCEDURE EVALUATION
Anesthesia Evaluation     Patient summary reviewed and Nursing notes reviewed   NPO Solid Status: > 8 hours  NPO Liquid Status: > 8 hours           Airway   Mallampati: II  TM distance: >3 FB  Neck ROM: full  No difficulty expected  Dental - normal exam     Pulmonary - normal exam   Cardiovascular   Exercise tolerance: good (4-7 METS)    Rhythm: regular  Rate: normal        Neuro/Psych  GI/Hepatic/Renal/Endo      Musculoskeletal     Abdominal    Substance History      OB/GYN          Other                    Anesthesia Plan    ASA 3     general     intravenous induction     Anesthetic plan, risks, benefits, and alternatives have been provided, discussed and informed consent has been obtained with: patient.    CODE STATUS:

## 2024-10-01 ENCOUNTER — ANESTHESIA (OUTPATIENT)
Dept: PERIOP | Facility: HOSPITAL | Age: 59
End: 2024-10-01
Payer: COMMERCIAL

## 2024-10-01 ENCOUNTER — ANESTHESIA EVENT (OUTPATIENT)
Dept: PERIOP | Facility: HOSPITAL | Age: 59
End: 2024-10-01
Payer: COMMERCIAL

## 2024-10-01 ENCOUNTER — ANESTHESIA EVENT CONVERTED (OUTPATIENT)
Dept: ANESTHESIOLOGY | Facility: HOSPITAL | Age: 59
DRG: 165 | End: 2024-10-01
Payer: COMMERCIAL

## 2024-10-01 ENCOUNTER — APPOINTMENT (OUTPATIENT)
Dept: GENERAL RADIOLOGY | Facility: HOSPITAL | Age: 59
DRG: 165 | End: 2024-10-01
Payer: COMMERCIAL

## 2024-10-01 LAB
ABO GROUP BLD: NORMAL
ANION GAP SERPL CALCULATED.3IONS-SCNC: 11 MMOL/L (ref 5–15)
BH BB BLOOD EXPIRATION DATE: NORMAL
BH BB BLOOD EXPIRATION DATE: NORMAL
BH BB BLOOD TYPE BARCODE: 6200
BH BB BLOOD TYPE BARCODE: 6200
BH BB DISPENSE STATUS: NORMAL
BH BB DISPENSE STATUS: NORMAL
BH BB PRODUCT CODE: NORMAL
BH BB PRODUCT CODE: NORMAL
BH BB UNIT NUMBER: NORMAL
BH BB UNIT NUMBER: NORMAL
BLD GP AB SCN SERPL QL: NEGATIVE
BUN SERPL-MCNC: 15 MG/DL (ref 6–20)
BUN/CREAT SERPL: 16.7 (ref 7–25)
CALCIUM SPEC-SCNC: 8.9 MG/DL (ref 8.6–10.5)
CHLORIDE SERPL-SCNC: 105 MMOL/L (ref 98–107)
CO2 SERPL-SCNC: 25 MMOL/L (ref 22–29)
CREAT SERPL-MCNC: 0.9 MG/DL (ref 0.57–1)
CROSSMATCH INTERPRETATION: NORMAL
CROSSMATCH INTERPRETATION: NORMAL
DEPRECATED RDW RBC AUTO: 45.1 FL (ref 37–54)
EGFRCR SERPLBLD CKD-EPI 2021: 73.8 ML/MIN/1.73
ERYTHROCYTE [DISTWIDTH] IN BLOOD BY AUTOMATED COUNT: 14.1 % (ref 12.3–15.4)
GLUCOSE SERPL-MCNC: 84 MG/DL (ref 65–99)
HCT VFR BLD AUTO: 41.7 % (ref 34–46.6)
HGB BLD-MCNC: 13.5 G/DL (ref 12–15.9)
MCH RBC QN AUTO: 28.2 PG (ref 26.6–33)
MCHC RBC AUTO-ENTMCNC: 32.4 G/DL (ref 31.5–35.7)
MCV RBC AUTO: 87.1 FL (ref 79–97)
PLATELET # BLD AUTO: 245 10*3/MM3 (ref 140–450)
PMV BLD AUTO: 9.7 FL (ref 6–12)
POTASSIUM SERPL-SCNC: 4.2 MMOL/L (ref 3.5–5.2)
RBC # BLD AUTO: 4.79 10*6/MM3 (ref 3.77–5.28)
RH BLD: POSITIVE
SODIUM SERPL-SCNC: 141 MMOL/L (ref 136–145)
T&S EXPIRATION DATE: NORMAL
UNIT  ABO: NORMAL
UNIT  ABO: NORMAL
UNIT  RH: NORMAL
UNIT  RH: NORMAL
WBC NRBC COR # BLD AUTO: 5.94 10*3/MM3 (ref 3.4–10.8)

## 2024-10-01 PROCEDURE — 25010000002 FENTANYL CITRATE (PF) 100 MCG/2ML SOLUTION: Performed by: ANESTHESIOLOGY

## 2024-10-01 PROCEDURE — 86900 BLOOD TYPING SEROLOGIC ABO: CPT | Performed by: PHYSICIAN ASSISTANT

## 2024-10-01 PROCEDURE — 32663 THORACOSCOPY W/LOBECTOMY: CPT | Performed by: PHYSICIAN ASSISTANT

## 2024-10-01 PROCEDURE — 25010000002 DEXAMETHASONE PER 1 MG: Performed by: ANESTHESIOLOGY

## 2024-10-01 PROCEDURE — 71045 X-RAY EXAM CHEST 1 VIEW: CPT

## 2024-10-01 PROCEDURE — 88305 TISSUE EXAM BY PATHOLOGIST: CPT | Performed by: THORACIC SURGERY (CARDIOTHORACIC VASCULAR SURGERY)

## 2024-10-01 PROCEDURE — 88341 IMHCHEM/IMCYTCHM EA ADD ANTB: CPT | Performed by: THORACIC SURGERY (CARDIOTHORACIC VASCULAR SURGERY)

## 2024-10-01 PROCEDURE — 25010000002 CEFAZOLIN PER 500 MG: Performed by: PHYSICIAN ASSISTANT

## 2024-10-01 PROCEDURE — 25010000002 KETOROLAC TROMETHAMINE PER 15 MG: Performed by: THORACIC SURGERY (CARDIOTHORACIC VASCULAR SURGERY)

## 2024-10-01 PROCEDURE — 88309 TISSUE EXAM BY PATHOLOGIST: CPT | Performed by: THORACIC SURGERY (CARDIOTHORACIC VASCULAR SURGERY)

## 2024-10-01 PROCEDURE — 25810000003 SODIUM CHLORIDE PER 500 ML: Performed by: THORACIC SURGERY (CARDIOTHORACIC VASCULAR SURGERY)

## 2024-10-01 PROCEDURE — 32674 THORACOSCOPY LYMPH NODE EXC: CPT | Performed by: PHYSICIAN ASSISTANT

## 2024-10-01 PROCEDURE — 88331 PATH CONSLTJ SURG 1 BLK 1SPC: CPT | Performed by: PATHOLOGY

## 2024-10-01 PROCEDURE — 0 BUPIVACAINE LIPOSOME 1.3 % SUSPENSION 20 ML VIAL: Performed by: THORACIC SURGERY (CARDIOTHORACIC VASCULAR SURGERY)

## 2024-10-01 PROCEDURE — 25010000002 CEFAZOLIN PER 500 MG: Performed by: THORACIC SURGERY (CARDIOTHORACIC VASCULAR SURGERY)

## 2024-10-01 PROCEDURE — 32674 THORACOSCOPY LYMPH NODE EXC: CPT | Performed by: THORACIC SURGERY (CARDIOTHORACIC VASCULAR SURGERY)

## 2024-10-01 PROCEDURE — 80048 BASIC METABOLIC PNL TOTAL CA: CPT | Performed by: PHYSICIAN ASSISTANT

## 2024-10-01 PROCEDURE — 25010000002 BUPIVACAINE (PF) 0.5 % SOLUTION 10 ML VIAL: Performed by: THORACIC SURGERY (CARDIOTHORACIC VASCULAR SURGERY)

## 2024-10-01 PROCEDURE — 32663 THORACOSCOPY W/LOBECTOMY: CPT | Performed by: THORACIC SURGERY (CARDIOTHORACIC VASCULAR SURGERY)

## 2024-10-01 PROCEDURE — 07B74ZX EXCISION OF THORAX LYMPHATIC, PERCUTANEOUS ENDOSCOPIC APPROACH, DIAGNOSTIC: ICD-10-PCS | Performed by: THORACIC SURGERY (CARDIOTHORACIC VASCULAR SURGERY)

## 2024-10-01 PROCEDURE — 88342 IMHCHEM/IMCYTCHM 1ST ANTB: CPT | Performed by: THORACIC SURGERY (CARDIOTHORACIC VASCULAR SURGERY)

## 2024-10-01 PROCEDURE — 25010000002 PROPOFOL 10 MG/ML EMULSION: Performed by: ANESTHESIOLOGY

## 2024-10-01 PROCEDURE — 25810000003 LACTATED RINGERS PER 1000 ML: Performed by: ANESTHESIOLOGY

## 2024-10-01 PROCEDURE — 25010000002 LIDOCAINE PF 1% 1 % SOLUTION: Performed by: ANESTHESIOLOGY

## 2024-10-01 PROCEDURE — 86923 COMPATIBILITY TEST ELECTRIC: CPT

## 2024-10-01 PROCEDURE — 8E0W4CZ ROBOTIC ASSISTED PROCEDURE OF TRUNK REGION, PERCUTANEOUS ENDOSCOPIC APPROACH: ICD-10-PCS | Performed by: THORACIC SURGERY (CARDIOTHORACIC VASCULAR SURGERY)

## 2024-10-01 PROCEDURE — 88360 TUMOR IMMUNOHISTOCHEM/MANUAL: CPT | Performed by: THORACIC SURGERY (CARDIOTHORACIC VASCULAR SURGERY)

## 2024-10-01 PROCEDURE — C9290 INJ, BUPIVACAINE LIPOSOME: HCPCS | Performed by: THORACIC SURGERY (CARDIOTHORACIC VASCULAR SURGERY)

## 2024-10-01 PROCEDURE — 85027 COMPLETE CBC AUTOMATED: CPT | Performed by: PHYSICIAN ASSISTANT

## 2024-10-01 PROCEDURE — 86850 RBC ANTIBODY SCREEN: CPT | Performed by: PHYSICIAN ASSISTANT

## 2024-10-01 PROCEDURE — 86901 BLOOD TYPING SEROLOGIC RH(D): CPT | Performed by: PHYSICIAN ASSISTANT

## 2024-10-01 PROCEDURE — 0BTC4ZZ RESECTION OF RIGHT UPPER LUNG LOBE, PERCUTANEOUS ENDOSCOPIC APPROACH: ICD-10-PCS | Performed by: THORACIC SURGERY (CARDIOTHORACIC VASCULAR SURGERY)

## 2024-10-01 PROCEDURE — 25010000002 SUGAMMADEX 200 MG/2ML SOLUTION: Performed by: ANESTHESIOLOGY

## 2024-10-01 PROCEDURE — 25010000002 ONDANSETRON PER 1 MG: Performed by: ANESTHESIOLOGY

## 2024-10-01 PROCEDURE — 25810000003 LACTATED RINGERS PER 1000 ML: Performed by: THORACIC SURGERY (CARDIOTHORACIC VASCULAR SURGERY)

## 2024-10-01 DEVICE — SUREFORM 45 RELOAD WHITE
Type: IMPLANTABLE DEVICE | Site: LUNG | Status: FUNCTIONAL
Brand: SUREFORM

## 2024-10-01 DEVICE — SUREFORM 45 RELOAD BLUE
Type: IMPLANTABLE DEVICE | Site: LUNG | Status: FUNCTIONAL
Brand: SUREFORM

## 2024-10-01 DEVICE — SUREFORM 45 RELOAD GREEN
Type: IMPLANTABLE DEVICE | Site: LUNG | Status: FUNCTIONAL
Brand: SUREFORM

## 2024-10-01 RX ORDER — SODIUM CHLORIDE 9 MG/ML
INJECTION, SOLUTION INTRAVENOUS AS NEEDED
Status: DISCONTINUED | OUTPATIENT
Start: 2024-10-01 | End: 2024-10-01 | Stop reason: HOSPADM

## 2024-10-01 RX ORDER — PROPOFOL 10 MG/ML
VIAL (ML) INTRAVENOUS AS NEEDED
Status: DISCONTINUED | OUTPATIENT
Start: 2024-10-01 | End: 2024-10-01 | Stop reason: SURG

## 2024-10-01 RX ORDER — ROCURONIUM BROMIDE 10 MG/ML
INJECTION, SOLUTION INTRAVENOUS AS NEEDED
Status: DISCONTINUED | OUTPATIENT
Start: 2024-10-01 | End: 2024-10-01 | Stop reason: SURG

## 2024-10-01 RX ORDER — ONDANSETRON 2 MG/ML
INJECTION INTRAMUSCULAR; INTRAVENOUS AS NEEDED
Status: DISCONTINUED | OUTPATIENT
Start: 2024-10-01 | End: 2024-10-01 | Stop reason: SURG

## 2024-10-01 RX ORDER — OXYCODONE HYDROCHLORIDE 5 MG/1
5 TABLET ORAL EVERY 4 HOURS PRN
Status: DISCONTINUED | OUTPATIENT
Start: 2024-10-01 | End: 2024-10-03 | Stop reason: HOSPADM

## 2024-10-01 RX ORDER — LIDOCAINE HYDROCHLORIDE 10 MG/ML
INJECTION, SOLUTION EPIDURAL; INFILTRATION; INTRACAUDAL; PERINEURAL AS NEEDED
Status: DISCONTINUED | OUTPATIENT
Start: 2024-10-01 | End: 2024-10-01 | Stop reason: SURG

## 2024-10-01 RX ORDER — ONDANSETRON 2 MG/ML
4 INJECTION INTRAMUSCULAR; INTRAVENOUS EVERY 6 HOURS PRN
Status: DISCONTINUED | OUTPATIENT
Start: 2024-10-01 | End: 2024-10-03 | Stop reason: HOSPADM

## 2024-10-01 RX ORDER — SODIUM CHLORIDE, SODIUM LACTATE, POTASSIUM CHLORIDE, CALCIUM CHLORIDE 600; 310; 30; 20 MG/100ML; MG/100ML; MG/100ML; MG/100ML
INJECTION, SOLUTION INTRAVENOUS CONTINUOUS PRN
Status: DISCONTINUED | OUTPATIENT
Start: 2024-10-01 | End: 2024-10-01 | Stop reason: SURG

## 2024-10-01 RX ORDER — DEXMEDETOMIDINE HYDROCHLORIDE 4 UG/ML
INJECTION, SOLUTION INTRAVENOUS AS NEEDED
Status: DISCONTINUED | OUTPATIENT
Start: 2024-10-01 | End: 2024-10-01 | Stop reason: SURG

## 2024-10-01 RX ORDER — FAMOTIDINE 10 MG/ML
20 INJECTION, SOLUTION INTRAVENOUS 2 TIMES DAILY
Status: DISCONTINUED | OUTPATIENT
Start: 2024-10-01 | End: 2024-10-01

## 2024-10-01 RX ORDER — DEXAMETHASONE SODIUM PHOSPHATE 4 MG/ML
INJECTION, SOLUTION INTRA-ARTICULAR; INTRALESIONAL; INTRAMUSCULAR; INTRAVENOUS; SOFT TISSUE AS NEEDED
Status: DISCONTINUED | OUTPATIENT
Start: 2024-10-01 | End: 2024-10-01 | Stop reason: SURG

## 2024-10-01 RX ORDER — ONDANSETRON 4 MG/1
4 TABLET, ORALLY DISINTEGRATING ORAL EVERY 6 HOURS PRN
Status: DISCONTINUED | OUTPATIENT
Start: 2024-10-01 | End: 2024-10-03 | Stop reason: HOSPADM

## 2024-10-01 RX ORDER — NITROGLYCERIN 0.4 MG/1
0.4 TABLET SUBLINGUAL
Status: DISCONTINUED | OUTPATIENT
Start: 2024-10-01 | End: 2024-10-03 | Stop reason: HOSPADM

## 2024-10-01 RX ORDER — FAMOTIDINE 10 MG/ML
20 INJECTION, SOLUTION INTRAVENOUS
Status: COMPLETED | OUTPATIENT
Start: 2024-10-01 | End: 2024-10-01

## 2024-10-01 RX ORDER — FENTANYL CITRATE 50 UG/ML
INJECTION, SOLUTION INTRAMUSCULAR; INTRAVENOUS AS NEEDED
Status: DISCONTINUED | OUTPATIENT
Start: 2024-10-01 | End: 2024-10-01 | Stop reason: SURG

## 2024-10-01 RX ORDER — HYDROMORPHONE HYDROCHLORIDE 1 MG/ML
0.5 INJECTION, SOLUTION INTRAMUSCULAR; INTRAVENOUS; SUBCUTANEOUS
Status: DISCONTINUED | OUTPATIENT
Start: 2024-10-01 | End: 2024-10-01 | Stop reason: HOSPADM

## 2024-10-01 RX ORDER — FENTANYL CITRATE 50 UG/ML
50 INJECTION, SOLUTION INTRAMUSCULAR; INTRAVENOUS
Status: DISCONTINUED | OUTPATIENT
Start: 2024-10-01 | End: 2024-10-01 | Stop reason: HOSPADM

## 2024-10-01 RX ORDER — HEPARIN SODIUM 5000 [USP'U]/ML
5000 INJECTION, SOLUTION INTRAVENOUS; SUBCUTANEOUS EVERY 8 HOURS SCHEDULED
Status: DISCONTINUED | OUTPATIENT
Start: 2024-10-02 | End: 2024-10-03 | Stop reason: HOSPADM

## 2024-10-01 RX ORDER — GABAPENTIN 100 MG/1
100 CAPSULE ORAL ONCE
Status: COMPLETED | OUTPATIENT
Start: 2024-10-01 | End: 2024-10-01

## 2024-10-01 RX ORDER — MORPHINE SULFATE 2 MG/ML
2 INJECTION, SOLUTION INTRAMUSCULAR; INTRAVENOUS EVERY 4 HOURS PRN
Status: ACTIVE | OUTPATIENT
Start: 2024-10-01 | End: 2024-10-02

## 2024-10-01 RX ORDER — GABAPENTIN 100 MG/1
100 CAPSULE ORAL 3 TIMES DAILY
Status: DISCONTINUED | OUTPATIENT
Start: 2024-10-01 | End: 2024-10-03 | Stop reason: HOSPADM

## 2024-10-01 RX ORDER — SODIUM CHLORIDE, SODIUM LACTATE, POTASSIUM CHLORIDE, CALCIUM CHLORIDE 600; 310; 30; 20 MG/100ML; MG/100ML; MG/100ML; MG/100ML
9 INJECTION, SOLUTION INTRAVENOUS CONTINUOUS
Status: DISCONTINUED | OUTPATIENT
Start: 2024-10-01 | End: 2024-10-01

## 2024-10-01 RX ORDER — METHOCARBAMOL 750 MG/1
750 TABLET, FILM COATED ORAL 2 TIMES DAILY PRN
Status: DISCONTINUED | OUTPATIENT
Start: 2024-10-01 | End: 2024-10-03 | Stop reason: HOSPADM

## 2024-10-01 RX ORDER — KETOROLAC TROMETHAMINE 30 MG/ML
30 INJECTION, SOLUTION INTRAMUSCULAR; INTRAVENOUS ONCE
Status: COMPLETED | OUTPATIENT
Start: 2024-10-01 | End: 2024-10-01

## 2024-10-01 RX ORDER — DROPERIDOL 2.5 MG/ML
0.62 INJECTION, SOLUTION INTRAMUSCULAR; INTRAVENOUS ONCE AS NEEDED
Status: DISCONTINUED | OUTPATIENT
Start: 2024-10-01 | End: 2024-10-01 | Stop reason: HOSPADM

## 2024-10-01 RX ADMIN — ONDANSETRON 4 MG: 2 INJECTION INTRAMUSCULAR; INTRAVENOUS at 18:57

## 2024-10-01 RX ADMIN — FENTANYL CITRATE 100 MCG: 50 INJECTION, SOLUTION INTRAMUSCULAR; INTRAVENOUS at 16:33

## 2024-10-01 RX ADMIN — SODIUM CHLORIDE, POTASSIUM CHLORIDE, SODIUM LACTATE AND CALCIUM CHLORIDE 9 ML/HR: 600; 310; 30; 20 INJECTION, SOLUTION INTRAVENOUS at 14:03

## 2024-10-01 RX ADMIN — GABAPENTIN 100 MG: 100 CAPSULE ORAL at 14:03

## 2024-10-01 RX ADMIN — FAMOTIDINE 20 MG: 10 INJECTION, SOLUTION INTRAVENOUS at 14:03

## 2024-10-01 RX ADMIN — SODIUM CHLORIDE 2000 MG: 900 INJECTION INTRAVENOUS at 23:40

## 2024-10-01 RX ADMIN — ROCURONIUM BROMIDE 20 MG: 10 INJECTION INTRAVENOUS at 18:01

## 2024-10-01 RX ADMIN — ACETAMINOPHEN 650 MG: 325 TABLET ORAL at 23:40

## 2024-10-01 RX ADMIN — DEXMEDETOMIDINE HYDROCHLORIDE IN 0.9% SODIUM CHLORIDE 8 MCG: 4 INJECTION INTRAVENOUS at 19:20

## 2024-10-01 RX ADMIN — DEXMEDETOMIDINE HYDROCHLORIDE IN 0.9% SODIUM CHLORIDE 16 MCG: 4 INJECTION INTRAVENOUS at 17:13

## 2024-10-01 RX ADMIN — ROCURONIUM BROMIDE 80 MG: 10 INJECTION INTRAVENOUS at 16:33

## 2024-10-01 RX ADMIN — SODIUM CHLORIDE 2000 MG: 900 INJECTION INTRAVENOUS at 09:43

## 2024-10-01 RX ADMIN — PROPOFOL 25 MCG/KG/MIN: 10 INJECTION, EMULSION INTRAVENOUS at 17:24

## 2024-10-01 RX ADMIN — SODIUM CHLORIDE 2000 MG: 900 INJECTION INTRAVENOUS at 16:55

## 2024-10-01 RX ADMIN — METHOCARBAMOL 750 MG: 750 TABLET ORAL at 23:40

## 2024-10-01 RX ADMIN — DEXMEDETOMIDINE HYDROCHLORIDE IN 0.9% SODIUM CHLORIDE 4 MCG: 4 INJECTION INTRAVENOUS at 18:57

## 2024-10-01 RX ADMIN — DEXAMETHASONE SODIUM PHOSPHATE 4 MG: 4 INJECTION INTRA-ARTICULAR; INTRALESIONAL; INTRAMUSCULAR; INTRAVENOUS; SOFT TISSUE at 16:33

## 2024-10-01 RX ADMIN — LIDOCAINE HYDROCHLORIDE 50 MG: 10 INJECTION, SOLUTION EPIDURAL; INFILTRATION; INTRACAUDAL; PERINEURAL at 16:33

## 2024-10-01 RX ADMIN — LEVOTHYROXINE SODIUM 100 MCG: 0.1 TABLET ORAL at 09:37

## 2024-10-01 RX ADMIN — SODIUM CHLORIDE, POTASSIUM CHLORIDE, SODIUM LACTATE AND CALCIUM CHLORIDE: 600; 310; 30; 20 INJECTION, SOLUTION INTRAVENOUS at 16:33

## 2024-10-01 RX ADMIN — PROPOFOL 200 MG: 10 INJECTION, EMULSION INTRAVENOUS at 16:33

## 2024-10-01 RX ADMIN — KETOROLAC TROMETHAMINE 30 MG: 30 INJECTION, SOLUTION INTRAMUSCULAR; INTRAVENOUS at 14:03

## 2024-10-01 RX ADMIN — GABAPENTIN 100 MG: 100 CAPSULE ORAL at 21:16

## 2024-10-01 RX ADMIN — SUGAMMADEX 250 MG: 100 INJECTION, SOLUTION INTRAVENOUS at 19:16

## 2024-10-01 RX ADMIN — MUPIROCIN 1 APPLICATION: 20 OINTMENT TOPICAL at 14:03

## 2024-10-01 NOTE — PLAN OF CARE
Goal Outcome Evaluation:              Outcome Evaluation: Pt transferred off unit at 1300 for planned procedure.

## 2024-10-01 NOTE — ANESTHESIA PREPROCEDURE EVALUATION
Anesthesia Evaluation     Patient summary reviewed and Nursing notes reviewed   no history of anesthetic complications:   NPO Solid Status: > 8 hours  NPO Liquid Status: > 2 hours           Airway   Mallampati: II  TM distance: >3 FB  Neck ROM: full  No difficulty expected  Dental - normal exam     Pulmonary - normal exam    breath sounds clear to auscultation  (+) lung cancer,sleep apnea  Cardiovascular - normal exam    ECG reviewed  Rhythm: regular  Rate: normal    (+) hypertension      Neuro/Psych- negative ROS  GI/Hepatic/Renal/Endo    (+) obesity, hiatal hernia, GERD, liver disease fatty liver disease, diabetes mellitus type 2, thyroid problem hypothyroidism    Musculoskeletal     Abdominal    Substance History      OB/GYN          Other   arthritis,   history of cancer (Lung ca)                Anesthesia Plan    ASA 3     general and Index     intravenous induction     Anesthetic plan, risks, benefits, and alternatives have been provided, discussed and informed consent has been obtained with: patient.    Plan discussed with CRNA.    CODE STATUS:

## 2024-10-01 NOTE — OP NOTE
Call placed to the patient per [facility] referral.    V/M left for patient to call back. Phone number and hours of business provided.    This is the 1st attempt to reach the patient. Will defer referral per protocol.     DATE OF PROCEDURE: 10/1/2024     PREOPERATIVE DIAGNOSES:  1. Right upper lobe carcinoid tumor     POSTOPERATIVE DIAGNOSES:    1. Right upper lobe carcinoid tumor     PROCEDURES PERFORMED:    1. Bronchoscopy  2. Right robotic assisted thoracoscopic surgery with Xi DaVinci robot   3. Right upper lobectomy  4. Mediastinal lymph node dissection  5. Intercostal nerve blocks with local    SURGEON: Christian Rao MD       ASSISTANT: Yessy Galicia PA-C  was responsible for performing the following activities: Retraction, Closing, Placing Dressing, and Held/Positioned Camera and their skilled assistance was necessary for the success of this case.    Circulator: Adela Santiago, DELFIN; Azeb Shahid RN; Dean Vargas RN; Tomasa Turner RN  Scrub Person: Carson Michel      ANESTHESIA: General endotracheal anesthesia with Dr. Gómez Navas MD     ESTIMATED BLOOD LOSS: Less than 25 mL.       INDICATIONS: 59-year-old  female with a history of hypertension, diabetes mellitus, obstructive sleep apnea, BOB, obesity (BMI 37.42) and hypothyroidism who initially presented with chest pain.  She was found to have a right upper lobe nodule and biopsies were consistent with a neuroendocrine/carcinoid tumor.  The patient was felt to be a reasonable candidate for bronchoscopy and robotic right upper lobectomy.  This was tentatively felt to represent a [id number] Clinical stage IA2 mass and surgery is of curative intent.  The risks and benefits of surgery were discussed with the patient including pain, bleeding, infection, air leak, myocardial infarction and death. The patient understood these risks and wished to proceed with surgery.      DESCRIPTION OF PROCEDURE:  The patient was taken to the operating room and placed under general endotracheal anesthesia.  A double-lumen endotracheal tube was placed and position verified with the pediatric bronchoscope.  Examination of the bilateral bronchial tree  down to the level of the subsegmental bronchi did not reveal any evidence of endobronchial mass or blood.  There were minimal secretions.  The patient was placed in the left lateral decubitus position and all 4 extremities were padded and secured to prevent neurologic injury.  She was prepped and draped in the usual sterile fashion and a timeout was performed including the patient's name, procedure and antibiotic administration.  An incision was made at the eighth intercostal space in the posterior axillary line.  Two additional incisions were made posteriorly along the eighth intercostal space and an anterior incision at the same level.  A 12 mm assistant port was placed between trocars 2 and 3.  The robot was docked in the standard fashion.  Carbon dioxide insufflation was initiated.   The inferior pulmonary ligament was divided up to the inferior pulmonary vein.  No lymph nodes were identified within the ligament.  The subcarinal space was dissected and station 7 lymph nodes sent for permanent pathology.  Lymph nodes were harvested from stations 2R, 4R, 7, 10 and 11.  The superior pulmonary vein branch to the right upper lobe was encircled and divided using a white staple load.  The arterial branches to the upper lobe were identified, encircled and divided using white staple loads.  The upper lobe bronchus was encircled and a test clamp applied with a 45 green staple load.  Test insufflation revealed satisfactory ventilation of the middle and lower lobes.  The bronchus was divided.  The remaining fissure was divided with additional blue staple loads.  The lobe was externalized using a 15 mm Endo Catch bag and sent for margins.  All involved interspaces were anesthetized using local anesthetic within the chest under direct vision.  The chest was then irrigated with warm water and test insufflation did not reveal any evidence of air leak.  One 28 Lao channel drain was placed posterior to the hilum.  This was  secured using 0 silk suture.  A 0 Ethibond suture was placed in a mattress fashion for later thoracostomy site closure.  The lung was then inflated under direct vision and found to satisfactorily occupy the chest.  The remaining incisions were closed with a running 2-0 Vicryl suture followed by 3-0 Vicryl dermal layer and 4-0 Monocryl subcuticular stitch.  Overlying skin glue was applied to these incisions and gauze and tape to the chest tube site.  The patient was returned to the supine position, extubated in the operating room and transported to recovery in stable condition.

## 2024-10-01 NOTE — CASE MANAGEMENT/SOCIAL WORK
Continued Stay Note  [facility]     Patient Name: Maria Dolores Sandoval  MRN: [id number]  Today's Date: 10/1/2024    Admit Date: 9/30/2024    Plan: Home   Discharge Plan       Row Name 10/01/24 1211       Plan    Plan Home    Patient/Family in Agreement with Plan yes    Plan Comments Spoke with patient at bedside regarding discharge planning.  Patient denies use of HH and repots having a CPAP, Glucometer and Blood pressure machine.  She has prescription coverage and her medications are affordable.  Patient lives in a multilevel house with bedroom upstairs and five steps to enter.  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  She does plan to remain upstairs when returning home due to having two dogs who will jump on her and will require her attention if downstairs.  She was completely independent prior to admission.  No immediate discharge needs verbalized.  CM following.  Patient plan is to discharge home via car with family to transport.    Final Discharge Disposition Code 01 - home or self-care                   Discharge Codes    No documentation.                 Expected Discharge Date and Time       Expected Discharge Date Expected Discharge Time    Oct 4, 2024               Aleshia Desir RN

## 2024-10-01 NOTE — ANESTHESIA PROCEDURE NOTES
Airway  Urgency: elective    Date/Time: 10/1/2024 4:35 PM  Airway not difficult    General Information and Staff    Patient location during procedure: OR    Indications and Patient Condition  Indications for airway management: airway protection    Preoxygenated: yes  MILS not maintained throughout  Mask difficulty assessment: 0 - not attempted    Final Airway Details  Final airway type: endotracheal airway      Successful airway: ETT and EBT - double lumen left  Cuffed: yes   Successful intubation technique: direct laryngoscopy and flexible bronchoscopy  Facilitating devices/methods: intubating stylet  Endotracheal tube insertion site: oral  Blade: Erika  Blade size: 3  EBT DL size (fr): 37  Cormack-Lehane Classification: grade I - full view of glottis  Placement verified by: chest auscultation and capnometry   Bronchial cuff pressure (cm H2O): 0  Measured from: lips  ETT/EBT  to lips (cm): 20  Number of attempts at approach: 1  Assessment: lips, teeth, and gum same as pre-op and atraumatic intubation    Additional Comments  Negative epigastric sounds, Breath sound equal bilaterally with symmetric chest rise and fall

## 2024-10-01 NOTE — H&P
CTS History and Physical    Patient Care Team:  Christiano Lamb DO as PCP - General (Family Medicine)  Radha Menchaca, RN as Nurse Navigator      Chief Complaint: Lung nodule    HPI  Patient is a 59 y.o. female referred to Dr. Rao per Pulmonology Dr. Marcelo Condon/Enedina GANT for consideration surgical resection right upper lobe neuroendocrine tumor.  Tumor was incidentally noted on imaging @ ER visit 7/29/24 during chest pain evaluation.  PMH: Lifelong non-smoker,  MELISA on CPAP, type 2 DM, RLS, HTN, GERD, fatty liver disease, hypothyroid, obesity, and RUL neuroendocrine tumor.  NM PET 8/16/2024 demonstrated 1.6 cm RUL nodule with SUV 5.53.  She underwent bronchoscopy 8/30/2024.  Tissue sampling confirmed well differentiated neuroendocrine tumor.  Current symptoms include cough, chest heaviness, and fatigue.  Recent weight loss has been intentional with diet/exercise.       Review of Systems  Constitutional: Positive for weight loss (50 lb intentional wt loss over 2024). Negative for chills, decreased appetite, diaphoresis, fever, malaise/fatigue, night sweats and weight gain.   HENT:  Positive for hoarse voice (with overuse).    Eyes: Negative.  Negative for blurred vision, double vision and visual disturbance.   Cardiovascular:  Positive for chest pain (heaviness in middle of chest) and dyspnea on exertion (deconditioning). Negative for claudication, irregular heartbeat, leg swelling, near-syncope, orthopnea, palpitations, paroxysmal nocturnal dyspnea and syncope.   Respiratory:  Positive for cough (tickle from drainage). Negative for hemoptysis, shortness of breath, sputum production and wheezing.    Hematologic/Lymphatic: Negative.  Negative for adenopathy and bleeding problem. Does not bruise/bleed easily.   Skin: Negative.  Negative for color change, nail changes, poor wound healing and rash.   Musculoskeletal:  Positive for neck pain (chronic). Negative for back pain, falls and muscle  cramps.   Gastrointestinal:  Positive for diarrhea and heartburn. Negative for abdominal pain and dysphagia.   Genitourinary: Negative.  Negative for flank pain.   Neurological:  Positive for dizziness (unsteadiness) and numbness (decreased sensation in fingertips and BLE -minimal). Negative for brief paralysis, disturbances in coordination, focal weakness, headaches, light-headedness, loss of balance, paresthesias, sensory change, vertigo and weakness.   Psychiatric/Behavioral:  Negative for depression and suicidal ideas. The patient is nervous/anxious (due to recent testing and results).    Allergic/Immunologic: Negative for persistent infections.    All other systems are reviewed and are negative.    History  Past Medical History:   Diagnosis Date    Arthritis     Back pain     Cancer     lung    Depression     Diabetes mellitus     no meds currently; checks blood sugars at home occasioannly    Diarrhea     Diarrhea following gastrointestinal surgery     since cholecystectomy     Disease of thyroid gland     hypothyroidism     Fatty liver 2005    Dr. G[facility]mez [facility]    Fatty liver     GERD (gastroesophageal reflux disease)     Headache     Hiatal hernia     still present    History of anemia     due to heavy periods     History of depression     History of migraine     early adulthood after a concussion-none recently     Hypertension     IBS (irritable bowel syndrome)     possible    Insomnia     Murmur     dx cardiologist heard    Polycystic disease, ovaries     Presenile cataract     not ripe yet    Scoliosis     Sleep apnea with use of continuous positive airway pressure (CPAP)     auto range of 6-16    Wears glasses      Past Surgical History:   Procedure Laterality Date    BRONCHOSCOPY WITH ION ROBOTIC ASSIST N/A 08/30/2024    Procedure: BRONCHOSCOPY NAVIGATION WITH ENDOBRONCHIAL ULTRASOUND AND ION ROBOT;  Surgeon: Christian Condon MD;  Location: [facility] ENDOSCOPY;  Service: Robotics -  Pulmonary;  Laterality: N/A;    CHOLECYSTECTOMY  2014    COLONOSCOPY  2017    polyps remoed     D & C HYSTEROSCOPY ENDOMETRIAL ABLATION N/A 08/30/2017    Procedure: HYSTEROSCOPY DILATATION AND CURETTAGE, ENDOMETRIAL ABLATION, POSSIBLE REMOVAL OF LEIOMYOMATA;  Surgeon: Veronica Riley MD;  Location:  [facility];  Service:     D & C HYSTEROSCOPY MYOSURE N/A 08/30/2017    Procedure: DILATATION AND CURETTAGE HYSTEROSCOPY WITH MYOSURE;  Surgeon: Veronica Riley MD;  Location:  [facility];  Service:     DIAGNOSTIC LAPAROSCOPY  2007    removed uterine septum     ENDOSCOPY      HERNIA REPAIR  2014    incisional hernia - possible mesh in place    LIVER BIOPSY  2012    TOTAL LAPAROSCOPIC HYSTERECTOMY N/A 11/03/2017    Procedure: TOTAL LAPAROSCOPIC HYSTERECTOMY, BILATERAL SALPINGO OOPHORECTOMY WITH DAVINCI ROBOT;  Surgeon: Ni Faulkner MD;  Location:  [facility];  Service:     UMBILICAL HERNIA REPAIR      mesh in place    VAGINA SURGERY      removal of polyps    WISDOM TOOTH EXTRACTION       Family History   Problem Relation Age of Onset    Diabetes Mother     Heart disease Mother     Cirrhosis Father     Ovarian cancer Sister 30        half sister    Colon cancer Maternal Uncle     Breast cancer Paternal Aunt 69     Social History     Tobacco Use    Smoking status: Never    Smokeless tobacco: Never   Vaping Use    Vaping status: Never Used   Substance Use Topics    Alcohol use: No    Drug use: No     Medications Prior to Admission   Medication Sig Dispense Refill Last Dose    buPROPion XL (WELLBUTRIN XL) 150 MG 24 hr tablet Take 1 tablet by mouth Every Evening.   9/29/2024    calcium carbonate (TUMS) 500 MG chewable tablet Chew 1 tablet As Needed for Indigestion or Heartburn.   Past Week    levothyroxine (SYNTHROID, LEVOTHROID) 100 MCG tablet Take 1 tablet by mouth Daily.   9/29/2024    lisinopril (PRINIVIL,ZESTRIL) 2.5 MG tablet 1 tablet Every Night.   9/29/2024    OneTouch Ultra test strip    9/29/2024  "    Allergies:  Codeine, Adhesive tape, Chlorhexidine, and Phenyleph-dexchlorphen-codeine    Objective    Vital Signs  Temp:  [97 °F (36.1 °C)-98.6 °F (37 °C)] 97.8 °F (36.6 °C)  Heart Rate:  [62-97] 62  Resp:  [18] 18  BP: (111-130)/(74-90) 111/74    Physical Exam:  General Appearance: alert, appears stated age and cooperative  Head: normocephalic, without obvious abnormality and atraumatic  Throat: gums healthy and no oral lesions  Neck: no adenopathy, suppple, trachea midline, no thyromegaly, no carotid bruit and no JVD  Lungs: clear to auscultation, respirations regular, respirations even and respirations unlabored  Heart: regular rhythm & normal rate, normal S1, S2, no murmur, no juan j, no rub and no click  Abdomen: normal bowel sounds, no masses and soft non-tender  Extremities: moves extremities well and no edema  Pulses: Pulses palpable and equal bilaterally  Skin: no bleeding, bruising or rash  Neurologic: Mental Status orientated to person, place, time and situation          Data Review:  Results from last 7 days   Lab Units 09/27/24  0826   WBC 10*3/mm3 5.80   HEMOGLOBIN g/dL 13.9   HEMATOCRIT % 42.7   PLATELETS 10*3/mm3 263     Results from last 7 days   Lab Units 09/27/24  0826   SODIUM mmol/L 140   POTASSIUM mmol/L 3.8   CHLORIDE mmol/L 104   CO2 mmol/L 25.0   BUN mg/dL 12   CREATININE mg/dL 1.00   GLUCOSE mg/dL 89   CALCIUM mg/dL 8.5*     Coagulation: No results found for: \"INR\", \"APTT\"  Cardiac markers:     ABGs:       Invalid input(s): \"PO2\"      Imaging Results (Last 72 Hours)       ** No results found for the last 72 hours. **                Assessment:        Incidental lung nodule, greater than or equal to 8mm    Lung nodule        Plan:  Due to equipment failure in operating yesterday, patient was rescheduled to have robotic VATS RUL wedge resection/possible right upper lobectomy this afternoon with Dr. Rao.  ROSENDOO      Adrinaa Conway PA-C  10/01/24  07:37 EDT      "

## 2024-10-01 NOTE — PLAN OF CARE
Problem: Adult Inpatient Plan of Care  Goal: Plan of Care Review  Outcome: Progressing  Goal: Patient-Specific Goal (Individualized)  Outcome: Progressing  Goal: Absence of Hospital-Acquired Illness or Injury  Outcome: Progressing  Intervention: Identify and Manage Fall Risk  Recent Flowsheet Documentation  Taken 10/1/2024 0200 by Sherry Thompson RN  Safety Promotion/Fall Prevention:   safety round/check completed   fall prevention program maintained  Taken 10/1/2024 0000 by Sherry Thompson RN  Safety Promotion/Fall Prevention:   safety round/check completed   fall prevention program maintained  Taken 9/30/2024 2200 by Sherry Thompson RN  Safety Promotion/Fall Prevention:   safety round/check completed   fall prevention program maintained  Taken 9/30/2024 2000 by Sherry Thompson RN  Safety Promotion/Fall Prevention:   safety round/check completed   fall prevention program maintained  Intervention: Prevent Skin Injury  Recent Flowsheet Documentation  Taken 10/1/2024 0200 by Sherry Thompson RN  Body Position: position changed independently  Skin Protection: adhesive use limited  Taken 10/1/2024 0000 by Sherry Thompson RN  Body Position: position changed independently  Skin Protection: adhesive use limited  Taken 9/30/2024 2200 by Sherry Thompson RN  Body Position: position changed independently  Skin Protection: adhesive use limited  Taken 9/30/2024 2000 by Sherry Thompson RN  Body Position: position changed independently  Skin Protection: adhesive use limited  Intervention: Prevent and Manage VTE (Venous Thromboembolism) Risk  Recent Flowsheet Documentation  Taken 9/30/2024 2000 by Sherry Thompson RN  Activity Management: up ad astrid  Goal: Optimal Comfort and Wellbeing  Outcome: Progressing  Goal: Readiness for Transition of Care  Outcome: Progressing     Problem: Hypertension Comorbidity  Goal: Blood Pressure in Desired Range  Outcome: Progressing  Intervention: Maintain  Blood Pressure Management  Recent Flowsheet Documentation  Taken 10/1/2024 0200 by Sherry Thompson, RN  Medication Review/Management: medications reviewed  Taken 10/1/2024 0000 by Sherry Thompson RN  Medication Review/Management: medications reviewed  Taken 9/30/2024 2200 by Sherry Thompson, RN  Medication Review/Management: medications reviewed  Taken 9/30/2024 2000 by Sherry Thompson, RN  Medication Review/Management: medications reviewed     Problem: Obstructive Sleep Apnea Risk or Actual Comorbidity Management  Goal: Unobstructed Breathing During Sleep  Outcome: Progressing   Goal Outcome Evaluation:

## 2024-10-01 NOTE — ANESTHESIA PROCEDURE NOTES
Arterial Line    Pre-sedation assessment completed: 10/1/2024 2:05 PM    Patient reassessed immediately prior to procedure    Patient location during procedure: pre-op  Start time: 10/1/2024 2:05 PM  Stop Time:10/1/2024 2:10 PM       Line placed for hemodynamic monitoring.  Performed By   Anesthesiologist: Farzad Pritchett MD   Preanesthetic Checklist  Completed: patient identified, IV checked, site marked, risks and benefits discussed, surgical consent, monitors and equipment checked, pre-op evaluation and timeout performed  Arterial Line Prep    Sterile Tech: cap, gloves and sterile barriers  Prep: ChloraPrep  Patient monitoring: blood pressure monitoring, continuous pulse oximetry and EKG  Arterial Line Procedure   Laterality:right  Location:  radial artery  Catheter size: 20 G   Guidance: palpation technique  Number of attempts: 1  Successful placement: yes   Post Assessment   Dressing Type: line sutured, occlusive dressing applied, secured with tape and wrist guard applied.   Complications no  Circ/Move/Sens Assessment: normal and unchanged.   Patient Tolerance: patient tolerated the procedure well with no apparent complications

## 2024-10-02 ENCOUNTER — APPOINTMENT (OUTPATIENT)
Dept: GENERAL RADIOLOGY | Facility: HOSPITAL | Age: 59
DRG: 165 | End: 2024-10-02
Payer: COMMERCIAL

## 2024-10-02 LAB
ANION GAP SERPL CALCULATED.3IONS-SCNC: 12 MMOL/L (ref 5–15)
BASOPHILS # BLD AUTO: 0.03 10*3/MM3 (ref 0–0.2)
BASOPHILS NFR BLD AUTO: 0.2 % (ref 0–1.5)
BUN SERPL-MCNC: 13 MG/DL (ref 6–20)
BUN/CREAT SERPL: 14.8 (ref 7–25)
CALCIUM SPEC-SCNC: 8.2 MG/DL (ref 8.6–10.5)
CHLORIDE SERPL-SCNC: 104 MMOL/L (ref 98–107)
CO2 SERPL-SCNC: 22 MMOL/L (ref 22–29)
CREAT SERPL-MCNC: 0.88 MG/DL (ref 0.57–1)
DEPRECATED RDW RBC AUTO: 43.1 FL (ref 37–54)
EGFRCR SERPLBLD CKD-EPI 2021: 75.8 ML/MIN/1.73
EOSINOPHIL # BLD AUTO: 0.01 10*3/MM3 (ref 0–0.4)
EOSINOPHIL NFR BLD AUTO: 0.1 % (ref 0.3–6.2)
ERYTHROCYTE [DISTWIDTH] IN BLOOD BY AUTOMATED COUNT: 13.7 % (ref 12.3–15.4)
GLUCOSE BLDC GLUCOMTR-MCNC: 130 MG/DL (ref 70–130)
GLUCOSE BLDC GLUCOMTR-MCNC: 139 MG/DL (ref 70–130)
GLUCOSE SERPL-MCNC: 190 MG/DL (ref 65–99)
HCT VFR BLD AUTO: 40.1 % (ref 34–46.6)
HGB BLD-MCNC: 13.3 G/DL (ref 12–15.9)
IMM GRANULOCYTES # BLD AUTO: 0.05 10*3/MM3 (ref 0–0.05)
IMM GRANULOCYTES NFR BLD AUTO: 0.4 % (ref 0–0.5)
LYMPHOCYTES # BLD AUTO: 0.8 10*3/MM3 (ref 0.7–3.1)
LYMPHOCYTES NFR BLD AUTO: 6.4 % (ref 19.6–45.3)
MCH RBC QN AUTO: 28.2 PG (ref 26.6–33)
MCHC RBC AUTO-ENTMCNC: 33.2 G/DL (ref 31.5–35.7)
MCV RBC AUTO: 85 FL (ref 79–97)
MONOCYTES # BLD AUTO: 0.93 10*3/MM3 (ref 0.1–0.9)
MONOCYTES NFR BLD AUTO: 7.5 % (ref 5–12)
NEUTROPHILS NFR BLD AUTO: 10.64 10*3/MM3 (ref 1.7–7)
NEUTROPHILS NFR BLD AUTO: 85.4 % (ref 42.7–76)
NRBC BLD AUTO-RTO: 0 /100 WBC (ref 0–0.2)
PLATELET # BLD AUTO: 251 10*3/MM3 (ref 140–450)
PMV BLD AUTO: 9.2 FL (ref 6–12)
POTASSIUM SERPL-SCNC: 3.8 MMOL/L (ref 3.5–5.2)
RBC # BLD AUTO: 4.72 10*6/MM3 (ref 3.77–5.28)
SODIUM SERPL-SCNC: 138 MMOL/L (ref 136–145)
WBC NRBC COR # BLD AUTO: 12.46 10*3/MM3 (ref 3.4–10.8)

## 2024-10-02 PROCEDURE — 25010000002 HEPARIN (PORCINE) PER 1000 UNITS: Performed by: PHYSICIAN ASSISTANT

## 2024-10-02 PROCEDURE — 99222 1ST HOSP IP/OBS MODERATE 55: CPT | Performed by: NURSE PRACTITIONER

## 2024-10-02 PROCEDURE — 85025 COMPLETE CBC W/AUTO DIFF WBC: CPT | Performed by: PHYSICIAN ASSISTANT

## 2024-10-02 PROCEDURE — 80048 BASIC METABOLIC PNL TOTAL CA: CPT | Performed by: PHYSICIAN ASSISTANT

## 2024-10-02 PROCEDURE — 99024 POSTOP FOLLOW-UP VISIT: CPT | Performed by: THORACIC SURGERY (CARDIOTHORACIC VASCULAR SURGERY)

## 2024-10-02 PROCEDURE — 82948 REAGENT STRIP/BLOOD GLUCOSE: CPT

## 2024-10-02 PROCEDURE — 71045 X-RAY EXAM CHEST 1 VIEW: CPT

## 2024-10-02 PROCEDURE — 25010000002 CEFAZOLIN PER 500 MG: Performed by: PHYSICIAN ASSISTANT

## 2024-10-02 RX ORDER — IBUPROFEN 600 MG/1
1 TABLET ORAL
Status: DISCONTINUED | OUTPATIENT
Start: 2024-10-02 | End: 2024-10-03 | Stop reason: HOSPADM

## 2024-10-02 RX ORDER — NICOTINE POLACRILEX 4 MG
15 LOZENGE BUCCAL
Status: DISCONTINUED | OUTPATIENT
Start: 2024-10-02 | End: 2024-10-03 | Stop reason: HOSPADM

## 2024-10-02 RX ORDER — INSULIN LISPRO 100 [IU]/ML
2-7 INJECTION, SOLUTION INTRAVENOUS; SUBCUTANEOUS
Status: DISCONTINUED | OUTPATIENT
Start: 2024-10-02 | End: 2024-10-03 | Stop reason: HOSPADM

## 2024-10-02 RX ORDER — DEXTROSE MONOHYDRATE 25 G/50ML
25 INJECTION, SOLUTION INTRAVENOUS
Status: DISCONTINUED | OUTPATIENT
Start: 2024-10-02 | End: 2024-10-03 | Stop reason: HOSPADM

## 2024-10-02 RX ADMIN — SODIUM CHLORIDE 2000 MG: 900 INJECTION INTRAVENOUS at 09:28

## 2024-10-02 RX ADMIN — BUPROPION HYDROCHLORIDE 150 MG: 150 TABLET, EXTENDED RELEASE ORAL at 16:43

## 2024-10-02 RX ADMIN — HEPARIN SODIUM 5000 UNITS: 5000 INJECTION INTRAVENOUS; SUBCUTANEOUS at 05:42

## 2024-10-02 RX ADMIN — HEPARIN SODIUM 5000 UNITS: 5000 INJECTION INTRAVENOUS; SUBCUTANEOUS at 16:42

## 2024-10-02 RX ADMIN — HEPARIN SODIUM 5000 UNITS: 5000 INJECTION INTRAVENOUS; SUBCUTANEOUS at 21:26

## 2024-10-02 RX ADMIN — METHOCARBAMOL 750 MG: 750 TABLET ORAL at 13:48

## 2024-10-02 RX ADMIN — GABAPENTIN 100 MG: 100 CAPSULE ORAL at 09:28

## 2024-10-02 RX ADMIN — LISINOPRIL 2.5 MG: 2.5 TABLET ORAL at 09:29

## 2024-10-02 RX ADMIN — LEVOTHYROXINE SODIUM 100 MCG: 0.1 TABLET ORAL at 09:28

## 2024-10-02 RX ADMIN — ACETAMINOPHEN 650 MG: 325 TABLET ORAL at 16:48

## 2024-10-02 RX ADMIN — GABAPENTIN 100 MG: 100 CAPSULE ORAL at 16:44

## 2024-10-02 RX ADMIN — GABAPENTIN 100 MG: 100 CAPSULE ORAL at 21:26

## 2024-10-02 NOTE — ANESTHESIA POSTPROCEDURE EVALUATION
Patient: Maria Dolores Sandoval    Procedure Summary       Date: 10/01/24 [facility]    Anesthesia Start: 1625 Anesthesia Stop: 2002    Procedures:       BRONCHOSCOPY ([place])      THORASCOPIC VIDEO ASSISTED RIGHT UPPER LOBECTOMY, INTERCOSTAL NERVE BLOCKS (Right: Chest)      MEDIASTINAL LYMPH NODE DISSECTION WITH DAVINCI ROBOT (Chest) Diagnosis:       Incidental lung nodule, greater than or equal to 8mm      (Incidental lung nodule, greater than or equal to 8mm [R91.1])    Surgeons: Christian Rao MD Provider: Gómez Navas MD    Anesthesia Type: general, Sophie ASA Status: 3            Anesthesia Type: general, Sophie    Vitals  Vitals Value Taken Time   /78 10/01/24 2003   Temp 97.9 °F (36.6 °C) 10/01/24 2003   Pulse 65 10/01/24 2003   Resp     SpO2 94 % 10/01/24 2003           Post Anesthesia Care and Evaluation    Patient location during evaluation: PACU  Patient participation: complete - patient participated  Level of consciousness: awake and alert  Pain management: adequate    Airway patency: patent  Anesthetic complications: No anesthetic complications  PONV Status: none  Cardiovascular status: hemodynamically stable and acceptable  Respiratory status: nonlabored ventilation, acceptable and nasal cannula  Hydration status: acceptable

## 2024-10-02 NOTE — PROGRESS NOTES
[facility] In-Patient Progress Note     LOS: 2 days      POD # 1 s/p Robotic RUL    Subjective  No complaints. No acute events. On 2L NC.    Objective  Vital Signs  Temp:  [97.5 °F (36.4 °C)-99 °F (37.2 °C)] 99 °F (37.2 °C)  Heart Rate:  [] 92  Resp:  [14-18] 15  BP: ()/(62-88) 117/73    Physical Exam:   General Appearance: alert, appears stated age and cooperative   Lungs: clear to auscultation, respirations regular, respirations even, and respirations unlabored   Heart: regular rhythm & normal rate, normal S1, S2, no murmur, no gallop, no rub, and no click   Skin: Incision c/d/I   CT: Small air leak with cough 260 cc/12 hours  Output by Drain (mL) 10/01/24 0701 - 10/01/24 1900 10/01/24 1901 - 10/02/24 0700 10/02/24 0701 - 10/02/24 0754 Range Total   Chest Tube 1 Right Pleural  260  260        Results     Results from last 7 days   Lab Units 10/02/24  0618   WBC 10*3/mm3 12.46*   HEMOGLOBIN g/dL 13.3   HEMATOCRIT % 40.1   PLATELETS 10*3/mm3 251     Results from last 7 days   Lab Units 10/02/24  0618   SODIUM mmol/L 138   POTASSIUM mmol/L 3.8   CHLORIDE mmol/L 104   CO2 mmol/L 22.0   BUN mg/dL 13   CREATININE mg/dL 0.88   GLUCOSE mg/dL 190*   CALCIUM mg/dL 8.2*     Imaging Results (Last 24 Hours)       Procedure Component Value Units Date/Time    XR Chest 1 View [[id number]] Resulted: 10/02/24 0225     Updated: 10/02/24 0526    XR Chest 1 View [[id number]] Collected: 10/01/24 2037     Updated: 10/01/24 2044    Narrative:      XR CHEST 1 VW    Date of Exam: 10/1/2024 8:17 PM EDT    Indication: postop    Comparison: Chest radiograph 9/27/2024    Findings:  Right-sided chest tube projects over the right lung apex.    Mediastinum: Interval postoperative changes.    Lungs: Postoperative changes of right upper lobectomy and mediastinal lymph node dissection. Left greater right basal opacities.    Pleura: No convincing pneumothorax or pleural effusion.    Bones and soft tissues: No  acute osseous abnormality.        Impression:      Impression:  Interval postoperative changes with right-sided chest tube in place.    Left greater than right basal opacities which may represent atelectasis. Aspiration or pneumonia to be excluded clinically.      Electronically Signed: Ricardo Irene    10/1/2024 8:40 PM EDT    Workstation ID: [id number]            Assessment  POD # 1 s/p Robotic RUL    Plan   Continue chest tube to -10   D/C IV fluids  Daily CXR  Ambulate  Pulmonary Toilet: IS q1 hr while awake  Await final pathology    Christian Rao MD  10/02/24  07:53 EDT

## 2024-10-02 NOTE — CASE MANAGEMENT/SOCIAL WORK
Continued Stay Note  [facility]     Patient Name: Maria Dolores Sandoval  MRN: [id number]  Today's Date: 10/2/2024    Admit Date: 9/30/2024    Plan: update   Discharge Plan       Row Name 10/02/24 1532       Plan    Plan update    Patient/Family in Agreement with Plan yes    Plan Comments Spoke with patient at bedside regarding discharge plan.  Patient continues with chest tube but has been up walking the [place].  No discharge needs verbalized.  CM following.  Patient plan is to discharge home via car with family to transport.    Final Discharge Disposition Code 01 - home or self-care                   Discharge Codes    No documentation.                 Expected Discharge Date and Time       Expected Discharge Date Expected Discharge Time    Oct 4, 2024               Aleshia Desir RN

## 2024-10-02 NOTE — CONSULTS
[facility] Medicine Services  CONSULT NOTE      Patient Name: Maria Dolores Sandoval  : 1965  MRN: [id number]    Primary Care Physician: Christiano Lamb DO  Provider requesting consultation: Christian Rao MD    Subjective   Subjective     Reason for Consultation:  Med management    HPI:  Maria Dolores Sandoval is a 59 y.o. female PMH PCOS, Pre diabetes, MELISA, obesity BMI 37.42, HTN, hypothryoidism, depression, allergies presenting with right upper lobe carcinoid tumor s/p thoracoscopic surgery, right upper lobectomy with mediastinal lymph node dissection. Pt reports using metformin for PCOS and continued it when she was diagnosed with prediabetes. Later, she changed her diet and lost 50 lbs and was able to stop the metformin. A1C this admission 5.4. Pt reports episodes of hypoglycemia.       Review of Systems  Gen- No fevers, chills  CV- No chest pain, palpitations  Resp- No cough, dyspnea  GI- No N/V/D, abd pain  MS- (+) upper back pain    Otherwise complete ROS reviewed and is negative except as mentioned in the HPI.    Past Medical History:   Diagnosis Date    Arthritis     Back pain     Cancer     lung    Depression     Diabetes mellitus     no meds currently; checks blood sugars at home occasioannly    Diarrhea     Diarrhea following gastrointestinal surgery     since cholecystectomy     Disease of thyroid gland     hypothyroidism     Fatty liver     Dr. G[facility]mez [facility]    Fatty liver     GERD (gastroesophageal reflux disease)     Headache     Hiatal hernia     still present    History of anemia     due to heavy periods     History of depression     History of migraine     early adulthood after a concussion-none recently     Hypertension     IBS (irritable bowel syndrome)     possible    Insomnia     Murmur     dx cardiologist heard    Polycystic disease, ovaries     Presenile cataract     not ripe yet    Scoliosis     Sleep apnea with use of continuous positive  airway pressure (CPAP)     auto range of 6-16    Wears glasses        Past Surgical History:   Procedure Laterality Date    BRONCHOSCOPY N/A 10/1/2024    Procedure: BRONCHOSCOPY;  Surgeon: Christian Rao MD;  Location:  [facility];  Service: Robotics - DaVinci;  Laterality: N/A;    BRONCHOSCOPY WITH ION ROBOTIC ASSIST N/A 08/30/2024    Procedure: BRONCHOSCOPY NAVIGATION WITH ENDOBRONCHIAL ULTRASOUND AND ION ROBOT;  Surgeon: Christian Condon MD;  Location:  [facility];  Service: Robotics - Pulmonary;  Laterality: N/A;    CHOLECYSTECTOMY  2014    COLONOSCOPY  2017    polyps remoed     D & C HYSTEROSCOPY ENDOMETRIAL ABLATION N/A 08/30/2017    Procedure: HYSTEROSCOPY DILATATION AND CURETTAGE, ENDOMETRIAL ABLATION, POSSIBLE REMOVAL OF LEIOMYOMATA;  Surgeon: Veronica Riley MD;  Location:  [facility];  Service:     D & C HYSTEROSCOPY MYOSURE N/A 08/30/2017    Procedure: DILATATION AND CURETTAGE HYSTEROSCOPY WITH MYOSURE;  Surgeon: Veronica Riley MD;  Location:  [facility];  Service:     DIAGNOSTIC LAPAROSCOPY  2007    removed uterine septum     ENDOSCOPY      HERNIA REPAIR  2014    incisional hernia - possible mesh in place    LIVER BIOPSY  2012    LYMPH NODE DISSECTION N/A 10/1/2024    Procedure: MEDIASTINAL LYMPH NODE DISSECTION WITH DAVINCI ROBOT;  Surgeon: Christian Rao MD;  Location:  [facility];  Service: Robotics - DaVinci;  Laterality: N/A;    THORACOSCOPY WITH WEDGE RESECTION Right 10/1/2024    Procedure: THORASCOPIC VIDEO ASSISTED RIGHT UPPER LOBECTOMY, INTERCOSTAL NERVE BLOCKS;  Surgeon: Christian Rao MD;  Location:  [facility];  Service: Robotics - DaVinci;  Laterality: Right;    TOTAL LAPAROSCOPIC HYSTERECTOMY N/A 11/03/2017    Procedure: TOTAL LAPAROSCOPIC HYSTERECTOMY, BILATERAL SALPINGO OOPHORECTOMY WITH DAVINCI ROBOT;  Surgeon: Ni Faulkner MD;  Location:  [facility];  Service:     UMBILICAL HERNIA REPAIR      mesh in place    VAGINA SURGERY      removal of polyps    BOUBACAR  TOOTH EXTRACTION         Family History: family history includes Breast cancer (age of onset: 69) in her paternal aunt; Cirrhosis in her father; Colon cancer in her maternal uncle; Diabetes in her mother; Heart disease in her mother; Ovarian cancer (age of onset: 30) in her sister. Otherwise pertinent FHx was reviewed and unremarkable.     Social History:  reports that she has never smoked. She has never used smokeless tobacco. She reports that she does not drink alcohol and does not use drugs.    Medications:  Medications Prior to Admission   Medication Sig Dispense Refill Last Dose    buPROPion XL (WELLBUTRIN XL) 150 MG 24 hr tablet Take 1 tablet by mouth Every Evening.   9/29/2024    calcium carbonate (TUMS) 500 MG chewable tablet Chew 1 tablet As Needed for Indigestion or Heartburn.   Past Week    levothyroxine (SYNTHROID, LEVOTHROID) 100 MCG tablet Take 1 tablet by mouth Daily.   9/29/2024    lisinopril (PRINIVIL,ZESTRIL) 2.5 MG tablet 1 tablet Every Night.   9/29/2024    OneTouch Ultra test strip    9/29/2024       Scheduled Meds:buPROPion XL, 150 mg, Oral, Q PM  gabapentin, 100 mg, Oral, TID  heparin (porcine), 5,000 Units, Subcutaneous, Q8H  insulin lispro, 2-7 Units, Subcutaneous, 4x Daily AC & at Bedtime  levothyroxine, 100 mcg, Oral, Daily  lisinopril, 2.5 mg, Oral, Q24H      Continuous Infusions:   PRN Meds:.  acetaminophen    dextrose    dextrose    glucagon (human recombinant)    methocarbamol    Morphine    nitroglycerin    ondansetron ODT **OR** ondansetron    oxyCODONE    Allergies   Allergen Reactions    Codeine GI Intolerance     vomiting    Adhesive Tape Unknown - Low Severity     Surgical tape and bandaids adhesives   - long exposure     Chlorhexidine Unknown - Low Severity     Possible-  had reaction to bandaid after surgery dosent know if prep caused issues also or not        Objective   Objective     Vital Signs:   Temp:  [97.5 °F (36.4 °C)-99 °F (37.2 °C)] 98 °F (36.7 °C)  Heart Rate:   "[] 102  Resp:  [14-18] 17  BP: ()/(62-83) 118/72     Physical Exam  Constitutional: Awake, alert, NAD  HENT: NCAT, mucous membranes moist  Respiratory: dim RUL, clear, nonlabored Chest tube (+)   Cardiovascular: RRR, no murmurs, rubs, or gallops  Gastrointestinal: Positive bowel sounds, soft, nontender, nondistended  Musculoskeletal: No bilateral ankle edema  Psychiatric: Appropriate affect, cooperative  Neurologic: Oriented x 3, VÁSQUEZ,  speech clear  Skin: No rashes      Results Reviewed:  I have personally reviewed current lab, radiology, and data and agree.    Results from last 7 days   Lab Units 10/02/24  0618 10/01/24  0926 09/27/24  0826   WBC 10*3/mm3 12.46*   < > 5.80   HEMOGLOBIN g/dL 13.3   < > 13.9   HEMATOCRIT % 40.1   < > 42.7   PLATELETS 10*3/mm3 251   < > 263   INR   --   --  0.94    < > = values in this interval not displayed.     Results from last 7 days   Lab Units 10/02/24  0618 10/01/24  0926 09/27/24  0826   SODIUM mmol/L 138   < > 140   POTASSIUM mmol/L 3.8   < > 3.8   CHLORIDE mmol/L 104   < > 104   CO2 mmol/L 22.0   < > 25.0   BUN mg/dL 13   < > 12   CREATININE mg/dL 0.88   < > 1.00   GLUCOSE mg/dL 190*   < > 89   CALCIUM mg/dL 8.2*   < > 8.5*   ALK PHOS U/L  --   --  86   ALT (SGPT) U/L  --   --  18   AST (SGOT) U/L  --   --  29    < > = values in this interval not displayed.     Estimated Creatinine Clearance: 81.5 mL/min (by C-G formula based on SCr of 0.88 mg/dL).  Brief Urine Lab Results       None          No results found for: \"BNP\"    Microbiology Results Abnormal       None            Imaging Results (Last 24 Hours)       Procedure Component Value Units Date/Time    XR Chest 1 View [[id number]] Collected: 10/02/24 0814     Updated: 10/02/24 0822    Narrative:      XR CHEST 1 VW    Date of Exam: 10/2/2024 2:24 AM EDT    Indication: postop    Comparison: Chest radiograph 10/1/2024.    Findings:  Postsurgical changes of right upper lobectomy with unchanged position of right " chest tube. Cardiomediastinal silhouette is unchanged. Low lung volumes. Unchanged scattered airspace opacities in the right lung and left lung base. No sizable pleural   effusion. Small right pneumothorax. No distinct left pneumothorax. Osseous structures are unchanged.      Impression:      Impression:  Right upper lobectomy with small right pneumothorax and right chest tube in place.    Similar scattered airspace opacities in the right lung and left lung base, some or all of which is suspected to represent atelectasis.      Electronically Signed: Gagan Glass MD    10/2/2024 8:19 AM EDT    Workstation ID: [id number]    XR Chest 1 View [[id number]] Collected: 10/01/24 2037     Updated: 10/01/24 2044    Narrative:      XR CHEST 1 VW    Date of Exam: 10/1/2024 8:17 PM EDT    Indication: postop    Comparison: Chest radiograph 9/27/2024    Findings:  Right-sided chest tube projects over the right lung apex.    Mediastinum: Interval postoperative changes.    Lungs: Postoperative changes of right upper lobectomy and mediastinal lymph node dissection. Left greater right basal opacities.    Pleura: No convincing pneumothorax or pleural effusion.    Bones and soft tissues: No acute osseous abnormality.        Impression:      Impression:  Interval postoperative changes with right-sided chest tube in place.    Left greater than right basal opacities which may represent atelectasis. Aspiration or pneumonia to be excluded clinically.      Electronically Signed: Ricardo Irene    10/1/2024 8:40 PM EDT    Workstation ID: [id number]              Assessment & Plan   Assessment / Plan     Active Hospital Problems    Diagnosis  POA    **Incidental lung nodule, greater than or equal to 8mm [R91.1]  Unknown     Incidental 1.6 cm right upper lobe well-circumscribed nodule on chest CT 7/29/2024 also noted on chest x-ray that same day in ER.  Not present on chest x-ray dated 2017      Lung nodule [R91.1]  Yes         Hospital Course to  date:  fermin Sandoval is a 59 y.o. female PMH PCOS, Pre diabetes, MELISA, obesity BMI 37.42, HTN, hypothryoidism, depression, allergies presenting with right upper lobe carcinoid tumor s/p thoracoscopic surgery, right upper lobectomy with mediastinal lymph node dissection. Pt reports using metformin for PCOS and continued it when she was diagnosed with prediabetes. Later, she changed her diet and lost 50 lbs and was able to stop the metformin. A1C this admission 5.4. Pt reports episodes of hypoglycemia.       Assessment/Plan  Right upper lobe carcinoid tumor   - s/p thoracoscopic surgery, right upper lobectomy with mediastinal lymph node dissection.   - management per CTS    PCOS  Prediabetes  - A1C 5.4  - currently on no home meds  - AC/HS while in the hospital with SSI if needed    MELISA  - CPAP    Obesity BMI 37.42  - complicates all aspects of care    HTN  - lisinopril    Hypothyroidism  - levothyroxine    Depression  - wellbutrin      Thank you for allowing [facility] Medicine Service to provide consultative care for your patient, we will continue to follow while clinically appropriate.    Electronically signed by STALIN Vale, 10/02/24, 12:08 PM EDT.

## 2024-10-02 NOTE — PAYOR COMM NOTE
"[facility]# [id number]   Clinical Update    LUDMILA Morgan, RN  Utilization Review  Phone [phone]  Fax [phone]    [place] [address]           Maria Dolores Wooten (59 y.o. Female)       Date of Birth   1965    Social Security Number       Address   [address] [address]    Home Phone   [phone]    MRN   [id number]       Mandaen   Buddhism    Marital Status                               Admission Date   9/30/24    Admission Type   Elective    Admitting Provider   Christian Rao MD    Attending Provider   Christian Rao MD    Department, Room/Bed   [facility] 4H, S468/1       Discharge Date       Discharge Disposition       Discharge Destination                                 Attending Provider: Christian Rao MD    Allergies: Codeine, Adhesive Tape, Chlorhexidine    Isolation: None   Infection: None   Code Status: CPR    Ht: 165.1 cm (65\")   Wt: 102 kg (224 lb 13.9 oz)    Admission Cmt: None   Principal Problem: Incidental lung nodule, greater than or equal to 8mm [R91.1]                   Active Insurance as of 9/30/2024       Primary Coverage       Payor Plan Insurance Group Employer/Plan Group    [employer] BLUE CROSS AN[employer]M BLUE CROSS BLUE [employer]IELD PPO [id number]       Payor Plan Address Payor Plan Phone Number Payor Plan Fax Number Effective Dates    [address] [phone]  1/1/2017 - None Entered    [place] [address]         Subscriber Name Subscriber Birth Date Member ID       BENNY WOOTEN 10/22/1960 [id number]                     Emergency Contacts        (Rel.) Home Phone Work Phone Mobile Phone    Benny Wooten (Spouse) [phone] -- [phone]              Current Facility-Administered Medications   Medication Dose Route Frequency Provider Last Rate Last Admin    acetaminophen (TYLENOL) tablet 650 mg  650 mg Oral Q4H PRN Yessy Galicia PA-C   650 mg at 10/01/24 2340    " buPROPion XL (WELLBUTRIN XL) 24 hr tablet 150 mg  150 mg Oral Q PM Yessy Galicia PA-C   150 mg at 09/30/24 2055    dextrose (D50W) (25 g/50 mL) IV injection 25 g  25 g Intravenous Q15 Min PRN Verona Lo MD        dextrose (GLUTOSE) oral gel 15 g  15 g Oral Q15 Min PRN Verona Lo MD        gabapentin (NEURONTIN) capsule 100 mg  100 mg Oral TID Christian Rao MD   100 mg at 10/02/24 0928    glucagon (GLUCAGEN) injection 1 mg  1 mg Intramuscular Q15 Min PRN Verona Lo MD        heparin (porcine) 5000 UNIT/ML injection 5,000 Units  5,000 Units Subcutaneous Q8H Yessy Galicia PA-C   5,000 Units at 10/02/24 0542    Insulin Lispro (humaLOG) injection 2-7 Units  2-7 Units Subcutaneous 4x Daily AC & at Bedtime Verona Lo MD        levothyroxine (SYNTHROID, LEVOTHROID) tablet 100 mcg  100 mcg Oral Daily Yessy Galicia PA-C   100 mcg at 10/02/24 0928    lisinopril (PRINIVIL,ZESTRIL) tablet 2.5 mg  2.5 mg Oral Q24H Yessy Galicia PA-C   2.5 mg at 10/02/24 0929    methocarbamol (ROBAXIN) tablet 750 mg  750 mg Oral BID PRN Christian Rao MD   750 mg at 10/02/24 1348    Morphine sulfate (PF) injection 2 mg  2 mg Intravenous Q4H PRN Christian Rao MD        nitroglycerin (NITROSTAT) SL tablet 0.4 mg  0.4 mg Sublingual Q5 Min PRN Yessy Galicia PA-C        ondansetron ODT (ZOFRAN-ODT) disintegrating tablet 4 mg  4 mg Oral Q6H PRN Yessy Galicia PA-C        Or    ondansetron (ZOFRAN) injection 4 mg  4 mg Intravenous Q6H PRN Yessy Galicia PA-C        oxyCODONE (ROXICODONE) immediate release tablet 5 mg  5 mg Oral Q4H Christian Estes MD         Lab Results (all)       Procedure Component Value Units Date/Time    Basic Metabolic Panel [[id number]]  (Abnormal) Collected: 10/02/24 0618    Specimen: Blood Updated: 10/02/24 0659     Glucose 190 mg/dL      BUN 13 mg/dL      Creatinine 0.88 mg/dL      Sodium 138 mmol/L      Potassium 3.8 mmol/L      Chloride 104  mmol/L      CO2 22.0 mmol/L      Calcium 8.2 mg/dL      BUN/Creatinine Ratio 14.8     Anion Gap 12.0 mmol/L      eGFR 75.8 mL/min/1.73     Narrative:      GFR Normal >60  Chronic Kidney Disease <60  Kidney Failure <15      Tissue Pathology Exam [[id number]] Collected: 10/01/24 1741    Specimen: Tissue from Lymph Node; Tissue from Lymph Node; Tissue from Lymph Node; Tissue from Lymph Node; Tissue from Lymph Node; Tissue from Lung, Right Upper Lobe Updated: 10/02/24 0643     Case Report --     Surgical Pathology Report                         Case: [id number]                                  Authorizing Provider:  Christian Rao MD         Collected:           10/01/2024 06:55 PM          Ordering Location:     [facility]   Received:            10/01/2024 07:15 PM                                 OR                                                                           Pathologist:           Charles Pettit MD                                                            Intraop:               Charles Pettit MD                                                            Specimen:    Lung, Right Upper Lobe, RIGHT UPPER LOBE FOR FROZEN WITH MARGINS                            Intraoperative Consultation --     Frozen section: Verbal report given to Dr. Rao via speakerphone on 10/1/2024 at 19:15 EDT.    FROZEN SECTION DIAGNOSIS: Bronchovascular margin negative for malignancy.  (GJK)  Stains used for Immediate Evaluation are acceptable.              CBC & Differential [[id number]]  (Abnormal) Collected: 10/02/24 0618    Specimen: Blood Updated: 10/02/24 0632    Narrative:      The following orders were created for panel order CBC & Differential.  Procedure                               Abnormality         Status                     ---------                               -----------         ------                     CBC Auto Differential[[id number]]        Abnormal            Final result                  Please view results for these tests on the individual orders.    CBC Auto Differential [[id number]]  (Abnormal) Collected: 10/02/24 0618    Specimen: Blood Updated: 10/02/24 0632     WBC 12.46 10*3/mm3      RBC 4.72 10*6/mm3      Hemoglobin 13.3 g/dL      Hematocrit 40.1 %      MCV 85.0 fL      MCH 28.2 pg      MCHC 33.2 g/dL      RDW 13.7 %      RDW-SD 43.1 fl      MPV 9.2 fL      Platelets 251 10*3/mm3      Neutrophil % 85.4 %      Lymphocyte % 6.4 %      Monocyte % 7.5 %      Eosinophil % 0.1 %      Basophil % 0.2 %      Immature Grans % 0.4 %      Neutrophils, Absolute 10.64 10*3/mm3      Lymphocytes, Absolute 0.80 10*3/mm3      Monocytes, Absolute 0.93 10*3/mm3      Eosinophils, Absolute 0.01 10*3/mm3      Basophils, Absolute 0.03 10*3/mm3      Immature Grans, Absolute 0.05 10*3/mm3      nRBC 0.0 /100 WBC     Basic Metabolic Panel [[id number]]  (Normal) Collected: 10/01/24 0926    Specimen: Blood Updated: 10/01/24 0957     Glucose 84 mg/dL      BUN 15 mg/dL      Creatinine 0.90 mg/dL      Sodium 141 mmol/L      Potassium 4.2 mmol/L      Comment: Slight hemolysis detected by analyzer. Result may be falsely elevated.        Chloride 105 mmol/L      CO2 25.0 mmol/L      Calcium 8.9 mg/dL      BUN/Creatinine Ratio 16.7     Anion Gap 11.0 mmol/L      eGFR 73.8 mL/min/1.73     Narrative:      GFR Normal >60  Chronic Kidney Disease <60  Kidney Failure <15      CBC (No Diff) [[id number]]  (Normal) Collected: 10/01/24 0926    Specimen: Blood Updated: 10/01/24 0939     WBC 5.94 10*3/mm3      RBC 4.79 10*6/mm3      Hemoglobin 13.5 g/dL      Hematocrit 41.7 %      MCV 87.1 fL      MCH 28.2 pg      MCHC 32.4 g/dL      RDW 14.1 %      RDW-SD 45.1 fl      MPV 9.7 fL      Platelets 245 10*3/mm3     POC Glucose Once [[id number]]  (Normal) Collected: 09/30/24 1111    Specimen: Blood Updated: 09/30/24 1115     Glucose 77 mg/dL           Imaging Results (All)       Procedure Component Value Units Date/Time    XR Chest 1 View  [[id number]] Collected: 10/02/24 0814     Updated: 10/02/24 0822    Narrative:      XR CHEST 1 VW    Date of Exam: 10/2/2024 2:24 AM EDT    Indication: postop    Comparison: Chest radiograph 10/1/2024.    Findings:  Postsurgical changes of right upper lobectomy with unchanged position of right chest tube. Cardiomediastinal silhouette is unchanged. Low lung volumes. Unchanged scattered airspace opacities in the right lung and left lung base. No sizable pleural   effusion. Small right pneumothorax. No distinct left pneumothorax. Osseous structures are unchanged.      Impression:      Impression:  Right upper lobectomy with small right pneumothorax and right chest tube in place.    Similar scattered airspace opacities in the right lung and left lung base, some or all of which is suspected to represent atelectasis.      Electronically Signed: Gagan Glass MD    10/2/2024 8:19 AM EDT    Workstation ID: [id number]    XR Chest 1 View [[id number]] Collected: 10/01/24 2037     Updated: 10/01/24 2044    Narrative:      XR CHEST 1 VW    Date of Exam: 10/1/2024 8:17 PM EDT    Indication: postop    Comparison: Chest radiograph 9/27/2024    Findings:  Right-sided chest tube projects over the right lung apex.    Mediastinum: Interval postoperative changes.    Lungs: Postoperative changes of right upper lobectomy and mediastinal lymph node dissection. Left greater right basal opacities.    Pleura: No convincing pneumothorax or pleural effusion.    Bones and soft tissues: No acute osseous abnormality.        Impression:      Impression:  Interval postoperative changes with right-sided chest tube in place.    Left greater than right basal opacities which may represent atelectasis. Aspiration or pneumonia to be excluded clinically.      Electronically Signed: Ricardo Irene    10/1/2024 8:40 PM EDT    Workstation ID: [id number]             Operative/Procedure Notes (all)        Christian Rao MD at 10/01/24 1700  Version 1 of 1          DATE OF PROCEDURE: 10/1/2024     PREOPERATIVE DIAGNOSES:  1. Right upper lobe carcinoid tumor     POSTOPERATIVE DIAGNOSES:    1. Right upper lobe carcinoid tumor     PROCEDURES PERFORMED:    1. Bronchoscopy  2. Right robotic assisted thoracoscopic surgery with Xi DaVinci robot   3. Right upper lobectomy  4. Mediastinal lymph node dissection  5. Intercostal nerve blocks with local    SURGEON: Christian Rao MD       ASSISTANT: Yessy Galciia PA-C  was responsible for performing the following activities: Retraction, Closing, Placing Dressing, and Held/Positioned Camera and their skilled assistance was necessary for the success of this case.    Circulator: Adela Santiago, DELFIN; Azbe Shahid RN; Dean Vargas RN; Tomasa Turner RN  Scrub Person: Carson Michel      ANESTHESIA: General endotracheal anesthesia with Dr. Gómez Navas MD     ESTIMATED BLOOD LOSS: Less than 25 mL.       INDICATIONS: 59-year-old  female with a history of hypertension, diabetes mellitus, obstructive sleep apnea, BOB, obesity (BMI 37.42) and hypothyroidism who initially presented with chest pain.  She was found to have a right upper lobe nodule and biopsies were consistent with a neuroendocrine/carcinoid tumor.  The patient was felt to be a reasonable candidate for bronchoscopy and robotic right upper lobectomy.  This was tentatively felt to represent a [id number] Clinical stage IA2 mass and surgery is of curative intent.  The risks and benefits of surgery were discussed with the patient including pain, bleeding, infection, air leak, myocardial infarction and death. The patient understood these risks and wished to proceed with surgery.      DESCRIPTION OF PROCEDURE:  The patient was taken to the operating room and placed under general endotracheal anesthesia.  A double-lumen endotracheal tube was placed and position verified with the pediatric bronchoscope.  Examination of the bilateral bronchial tree  down to the level of the subsegmental bronchi did not reveal any evidence of endobronchial mass or blood.  There were minimal secretions.  The patient was placed in the left lateral decubitus position and all 4 extremities were padded and secured to prevent neurologic injury.  She was prepped and draped in the usual sterile fashion and a timeout was performed including the patient's name, procedure and antibiotic administration.  An incision was made at the eighth intercostal space in the posterior axillary line.  Two additional incisions were made posteriorly along the eighth intercostal space and an anterior incision at the same level.  A 12 mm assistant port was placed between trocars 2 and 3.  The robot was docked in the standard fashion.  Carbon dioxide insufflation was initiated.   The inferior pulmonary ligament was divided up to the inferior pulmonary vein.  No lymph nodes were identified within the ligament.  The subcarinal space was dissected and station 7 lymph nodes sent for permanent pathology.  Lymph nodes were harvested from stations 2R, 4R, 7, 10 and 11.  The superior pulmonary vein branch to the right upper lobe was encircled and divided using a white staple load.  The arterial branches to the upper lobe were identified, encircled and divided using white staple loads.  The upper lobe bronchus was encircled and a test clamp applied with a 45 green staple load.  Test insufflation revealed satisfactory ventilation of the middle and lower lobes.  The bronchus was divided.  The remaining fissure was divided with additional blue staple loads.  The lobe was externalized using a 15 mm Endo Catch bag and sent for margins.  All involved interspaces were anesthetized using local anesthetic within the chest under direct vision.  The chest was then irrigated with warm water and test insufflation did not reveal any evidence of air leak.  One 28 Welsh channel drain was placed posterior to the hilum.  This was  secured using 0 silk suture.  A 0 Ethibond suture was placed in a mattress fashion for later thoracostomy site closure.  The lung was then inflated under direct vision and found to satisfactorily occupy the chest.  The remaining incisions were closed with a running 2-0 Vicryl suture followed by 3-0 Vicryl dermal layer and 4-0 Monocryl subcuticular stitch.  Overlying skin glue was applied to these incisions and gauze and tape to the chest tube site.  The patient was returned to the supine position, extubated in the operating room and transported to recovery in stable condition.      Electronically signed by Christian Rao MD at 10/01/24 1938          Physician Progress Notes (all)        Kassie Figueroa APRN at 10/02/24 4470          [facility] Cardiothoracic Surgery In-Patient Progress Note     LOS: 2 days      POD # 1 s/p Robotic RUL    Subjective  No complaints. No acute events. On 2L saturations 95%.      Objective  Vital Signs  Temp:  [97.5 °F (36.4 °C)-99 °F (37.2 °C)] 99 °F (37.2 °C)  Heart Rate:  [] 92  Resp:  [14-18] 15  BP: ()/(62-88) 117/73        Physical Exam:   General Appearance: alert, appears stated age and cooperative   Lungs: clear to auscultation, respirations regular, respirations even, and respirations unlabored   Heart: regular rhythm & normal rate, normal S1, S2, no murmur, no gallop, no rub, and no click   Skin: Incision c/d/I   CT: Small air leak with cough 260 cc/12 hours  Output by Drain (mL) 10/01/24 0701 - 10/01/24 1900 10/01/24 1901 - 10/02/24 0700 10/02/24 0701 - 10/02/24 0754 Range Total   Chest Tube 1 Right Pleural  260  260        Results     Results from last 7 days   Lab Units 10/02/24  0618   WBC 10*3/mm3 12.46*   HEMOGLOBIN g/dL 13.3   HEMATOCRIT % 40.1   PLATELETS 10*3/mm3 251     Results from last 7 days   Lab Units 10/02/24  0618   SODIUM mmol/L 138   POTASSIUM mmol/L 3.8   CHLORIDE mmol/L 104   CO2 mmol/L 22.0   BUN mg/dL 13   CREATININE mg/dL 0.88    GLUCOSE mg/dL 190*   CALCIUM mg/dL 8.2*     Imaging Results (Last 24 Hours)       Procedure Component Value Units Date/Time    XR Chest 1 View [[id number]] Resulted: 10/02/24 0225     Updated: 10/02/24 0526    XR Chest 1 View [[id number]] Collected: 10/01/24 2037     Updated: 10/01/24 2044    Narrative:      XR CHEST 1 VW    Date of Exam: 10/1/2024 8:17 PM EDT    Indication: postop    Comparison: Chest radiograph 9/27/2024    Findings:  Right-sided chest tube projects over the right lung apex.    Mediastinum: Interval postoperative changes.    Lungs: Postoperative changes of right upper lobectomy and mediastinal lymph node dissection. Left greater right basal opacities.    Pleura: No convincing pneumothorax or pleural effusion.    Bones and soft tissues: No acute osseous abnormality.        Impression:      Impression:  Interval postoperative changes with right-sided chest tube in place.    Left greater than right basal opacities which may represent atelectasis. Aspiration or pneumonia to be excluded clinically.      Electronically Signed: Ricardo Irene    10/1/2024 8:40 PM EDT    Workstation ID: [id number]            Assessment  POD # 1 s/p Robotic RUL    Plan   Continue chest tube to -10   D/C IV fluids  Daily CXR  Ambulate  Pulmonary Toilet: IS q1 hr while awake  Await final pathology    STALIN Chaparro  10/02/24  07:53 EDT    Electronically signed by Kassie Figueroa APRN at 10/02/24 0937       Consult Notes (all)    No notes of this type exist for this encounter.

## 2024-10-02 NOTE — PLAN OF CARE
Goal Outcome Evaluation:              Outcome Evaluation: VSS; A/O x4; O2 > 90% on room air; Pt in bed; alarm on; call light within reach. No needs verbalized at this time.

## 2024-10-03 ENCOUNTER — APPOINTMENT (OUTPATIENT)
Dept: GENERAL RADIOLOGY | Facility: HOSPITAL | Age: 59
DRG: 165 | End: 2024-10-03
Payer: COMMERCIAL

## 2024-10-03 ENCOUNTER — READMISSION MANAGEMENT (OUTPATIENT)
Dept: CALL CENTER | Facility: HOSPITAL | Age: 59
End: 2024-10-03
Payer: COMMERCIAL

## 2024-10-03 VITALS
BODY MASS INDEX: 37.47 KG/M2 | HEIGHT: 65 IN | WEIGHT: 224.87 LBS | TEMPERATURE: 98.4 F | RESPIRATION RATE: 18 BRPM | SYSTOLIC BLOOD PRESSURE: 130 MMHG | HEART RATE: 87 BPM | OXYGEN SATURATION: 93 % | DIASTOLIC BLOOD PRESSURE: 86 MMHG

## 2024-10-03 LAB
ANION GAP SERPL CALCULATED.3IONS-SCNC: 9 MMOL/L (ref 5–15)
BH BB BLOOD EXPIRATION DATE: NORMAL
BH BB BLOOD TYPE BARCODE: 6200
BH BB DISPENSE STATUS: NORMAL
BH BB PRODUCT CODE: NORMAL
BH BB UNIT NUMBER: NORMAL
BUN SERPL-MCNC: 12 MG/DL (ref 6–20)
BUN/CREAT SERPL: 14.8 (ref 7–25)
CALCIUM SPEC-SCNC: 8.1 MG/DL (ref 8.6–10.5)
CHLORIDE SERPL-SCNC: 111 MMOL/L (ref 98–107)
CO2 SERPL-SCNC: 23 MMOL/L (ref 22–29)
CREAT SERPL-MCNC: 0.81 MG/DL (ref 0.57–1)
CROSSMATCH INTERPRETATION: NORMAL
DEPRECATED RDW RBC AUTO: 45.6 FL (ref 37–54)
EGFRCR SERPLBLD CKD-EPI 2021: 83.7 ML/MIN/1.73
ERYTHROCYTE [DISTWIDTH] IN BLOOD BY AUTOMATED COUNT: 14.5 % (ref 12.3–15.4)
GLUCOSE BLDC GLUCOMTR-MCNC: 141 MG/DL (ref 70–130)
GLUCOSE BLDC GLUCOMTR-MCNC: 91 MG/DL (ref 70–130)
GLUCOSE SERPL-MCNC: 104 MG/DL (ref 65–99)
HCT VFR BLD AUTO: 40.6 % (ref 34–46.6)
HGB BLD-MCNC: 13.2 G/DL (ref 12–15.9)
MCH RBC QN AUTO: 28.1 PG (ref 26.6–33)
MCHC RBC AUTO-ENTMCNC: 32.5 G/DL (ref 31.5–35.7)
MCV RBC AUTO: 86.6 FL (ref 79–97)
PLATELET # BLD AUTO: 258 10*3/MM3 (ref 140–450)
PMV BLD AUTO: 9.7 FL (ref 6–12)
POTASSIUM SERPL-SCNC: 3.9 MMOL/L (ref 3.5–5.2)
RBC # BLD AUTO: 4.69 10*6/MM3 (ref 3.77–5.28)
SODIUM SERPL-SCNC: 143 MMOL/L (ref 136–145)
UNIT  ABO: NORMAL
UNIT  RH: NORMAL
WBC NRBC COR # BLD AUTO: 10.84 10*3/MM3 (ref 3.4–10.8)

## 2024-10-03 PROCEDURE — 82948 REAGENT STRIP/BLOOD GLUCOSE: CPT

## 2024-10-03 PROCEDURE — 80048 BASIC METABOLIC PNL TOTAL CA: CPT

## 2024-10-03 PROCEDURE — 71045 X-RAY EXAM CHEST 1 VIEW: CPT

## 2024-10-03 PROCEDURE — 25010000002 HEPARIN (PORCINE) PER 1000 UNITS: Performed by: PHYSICIAN ASSISTANT

## 2024-10-03 PROCEDURE — 85027 COMPLETE CBC AUTOMATED: CPT

## 2024-10-03 PROCEDURE — 99024 POSTOP FOLLOW-UP VISIT: CPT | Performed by: THORACIC SURGERY (CARDIOTHORACIC VASCULAR SURGERY)

## 2024-10-03 PROCEDURE — 99232 SBSQ HOSP IP/OBS MODERATE 35: CPT | Performed by: FAMILY MEDICINE

## 2024-10-03 RX ORDER — HYDROCODONE BITARTRATE AND ACETAMINOPHEN 7.5; 325 MG/1; MG/1
1 TABLET ORAL EVERY 6 HOURS PRN
Qty: 25 TABLET | Refills: 0 | Status: SHIPPED | OUTPATIENT
Start: 2024-10-03

## 2024-10-03 RX ADMIN — ACETAMINOPHEN 650 MG: 325 TABLET ORAL at 07:36

## 2024-10-03 RX ADMIN — HEPARIN SODIUM 5000 UNITS: 5000 INJECTION INTRAVENOUS; SUBCUTANEOUS at 05:52

## 2024-10-03 RX ADMIN — LISINOPRIL 2.5 MG: 2.5 TABLET ORAL at 08:16

## 2024-10-03 RX ADMIN — GABAPENTIN 100 MG: 100 CAPSULE ORAL at 08:17

## 2024-10-03 RX ADMIN — METHOCARBAMOL 750 MG: 750 TABLET ORAL at 09:32

## 2024-10-03 RX ADMIN — OXYCODONE HYDROCHLORIDE 5 MG: 5 TABLET ORAL at 09:32

## 2024-10-03 RX ADMIN — LEVOTHYROXINE SODIUM 100 MCG: 0.1 TABLET ORAL at 08:17

## 2024-10-03 NOTE — CASE MANAGEMENT/SOCIAL WORK
Continued Stay Note   Elie     Patient Name: Maria Dolores Sandoval  MRN: [id number]  Today's Date: 10/3/2024    Admit Date: 9/30/2024    Plan: update   Discharge Plan       Row Name 10/03/24 1258       Plan    Plan update    Patient/Family in Agreement with Plan yes    Plan Comments Per MDR, possible discharge today, Chest Tube pulled, RN reports possible orthostatic BP and will continue to monitor.  Spoke with patient who is up to chair, has eaten lunch and is feeling better.  She is ok to stay or go whatever is best.  No discharge needs verbalized.  CM following.  Patient plan is to discharge home via car with family to transport.    Final Discharge Disposition Code 01 - home or self-care                   Discharge Codes    No documentation.                 Expected Discharge Date and Time       Expected Discharge Date Expected Discharge Time    Oct 4, 2024               Aleshia Desir, RN

## 2024-10-03 NOTE — PROGRESS NOTES
[facility] Medicine Services  PROGRESS NOTE    Patient Name: Maria Dolores Sandoval  : 1965  MRN: [id number]    Date of Admission: 2024  Primary Care Physician: Christiano Lamb,     Subjective   Subjective     CC:  Med Management     HPI:  Patient is a 59-year-old female seen and examined by me this a.m., laying in bed she is resting, she has had chest tube removed and is now on room air.  She reports she feels short of breath still at times especially with exertion.  Patient also reports some mild dizziness as well this a.m. no other new acute complaints or problems, upon chart review appears patient may be discharged home soon from CT surgery      Objective   Objective     Vital Signs:   Temp:  [98 °F (36.7 °C)-99.2 °F (37.3 °C)] 99 °F (37.2 °C)  Heart Rate:  [] 87  Resp:  [17-18] 18  BP: (118-144)/(72-89) 130/86     Physical Exam:  Constitutional: No acute distress, awake, alert, currently resting in bed, on RA, frail apppearing  HENT: NCAT, mucous membranes moist  Respiratory:Decreased BS bilaterally, no rhonchi or wheezing, respiratory effort normal   Cardiovascular: RRR, no murmurs, rubs, or gallops  Gastrointestinal: Positive bowel sounds, soft, nontender, nondistended  Musculoskeletal: No bilateral ankle edema  Psychiatric: Appropriate affect, cooperative  Neurologic: Oriented x 3, strength symmetric in all extremities, Cranial Nerves grossly intact to confrontation, speech clear  Skin: No rashes      Results Reviewed:  LAB RESULTS:      Lab 10/03/24  0605 10/02/24  0618 10/01/24  0926 24  0826   WBC 10.84* 12.46* 5.94 5.80   HEMOGLOBIN 13.2 13.3 13.5 13.9   HEMATOCRIT 40.6 40.1 41.7 42.7   PLATELETS 258 251 245 263   NEUTROS ABS  --  10.64*  --  3.54   IMMATURE GRANS (ABS)  --  0.05  --  0.02   LYMPHS ABS  --  0.80  --  1.34   MONOS ABS  --  0.93*  --  0.55   EOS ABS  --  0.01  --  0.29   MCV 86.6 85.0 87.1 86.1   PROTIME  --   --   --  12.6          Lab 10/03/24  0605 10/02/24  0618 10/01/24  0926 09/27/24  0826   SODIUM 143 138 141 140   POTASSIUM 3.9 3.8 4.2 3.8   CHLORIDE 111* 104 105 104   CO2 23.0 22.0 25.0 25.0   ANION GAP 9.0 12.0 11.0 11.0   BUN 12 13 15 12   CREATININE 0.81 0.88 0.90 1.00   EGFR 83.7 75.8 73.8 65.0   GLUCOSE 104* 190* 84 89   CALCIUM 8.1* 8.2* 8.9 8.5*   HEMOGLOBIN A1C  --   --   --  5.40         Lab 09/27/24  0826   TOTAL PROTEIN 6.8   ALBUMIN 4.2   ALT (SGPT) 18   AST (SGOT) 29   BILIRUBIN 0.4   INDIRECT BILIRUBIN 0.2   BILIRUBIN DIRECT 0.2   ALK PHOS 86         Lab 09/27/24  0826   PROTIME 12.6   INR 0.94             Lab 10/01/24  0926 09/27/24  0826   ABO TYPING A A   RH TYPING Positive Positive   ANTIBODY SCREEN Negative Negative         Brief Urine Lab Results       None            Microbiology Results Abnormal       None            XR Chest 1 View    Result Date: 10/3/2024  XR CHEST 1 VW Date of Exam: 10/3/2024 3:52 AM EDT Indication: postop Comparison: Chest radiograph 10/2/2024. Findings: Postsurgical changes of right upper lobectomy. Unchanged position of right chest tube terminating over the right apex. Cardiomediastinal silhouette is unchanged. Low lung volumes with left greater than right bibasilar atelectasis. Slightly decreased small airspace opacity in the right mid/upper lung suggestive of improving atelectasis. Slightly decreased, trace right pneumothorax. No left pneumothorax. Osseous structures are unchanged.     Impression: Impression: Right upper lobectomy with decreased/trace right pneumothorax and right chest tube in place. Electronically Signed: Gagan Glass MD  10/3/2024 7:59 AM EDT  Workstation ID: [id number]    XR Chest 1 View    Result Date: 10/2/2024  XR CHEST 1 VW Date of Exam: 10/2/2024 2:24 AM EDT Indication: postop Comparison: Chest radiograph 10/1/2024. Findings: Postsurgical changes of right upper lobectomy with unchanged position of right chest tube. Cardiomediastinal silhouette is unchanged. Low  lung volumes. Unchanged scattered airspace opacities in the right lung and left lung base. No sizable pleural effusion. Small right pneumothorax. No distinct left pneumothorax. Osseous structures are unchanged.     Impression: Impression: Right upper lobectomy with small right pneumothorax and right chest tube in place. Similar scattered airspace opacities in the right lung and left lung base, some or all of which is suspected to represent atelectasis. Electronically Signed: Gagan Glass MD  10/2/2024 8:19 AM EDT  Workstation ID: [id number]    XR Chest 1 View    Result Date: 10/1/2024  XR CHEST 1 VW Date of Exam: 10/1/2024 8:17 PM EDT Indication: postop Comparison: Chest radiograph 9/27/2024 Findings: Right-sided chest tube projects over the right lung apex. Mediastinum: Interval postoperative changes. Lungs: Postoperative changes of right upper lobectomy and mediastinal lymph node dissection. Left greater right basal opacities. Pleura: No convincing pneumothorax or pleural effusion. Bones and soft tissues: No acute osseous abnormality.     Impression: Impression: Interval postoperative changes with right-sided chest tube in place. Left greater than right basal opacities which may represent atelectasis. Aspiration or pneumonia to be excluded clinically. Electronically Signed: Ricardo Irene  10/1/2024 8:40 PM EDT  Workstation ID: [id number]    Arterial Line    Result Date: 10/1/2024  Farzad Pritchett MD     10/1/2024  2:13 PM Arterial Line Pre-sedation assessment completed: 10/1/2024 2:05 PM Patient reassessed immediately prior to procedure Patient location during procedure: pre-op Start time: 10/1/2024 2:05 PM Stop Time:10/1/2024 2:10 PM  Line placed for hemodynamic monitoring. Performed By Anesthesiologist: Farzad Pritchett MD Preanesthetic Checklist Completed: patient identified, IV checked, site marked, risks and benefits discussed, surgical consent, monitors and equipment checked, pre-op evaluation and timeout  performed Arterial Line Prep  Sterile Tech: cap, gloves and sterile barriers Prep: ChloraPrep Patient monitoring: blood pressure monitoring, continuous pulse oximetry and EKG Arterial Line Procedure Laterality:right Location:  radial artery Catheter size: 20 G Guidance: palpation technique Number of attempts: 1 Successful placement: yes Post Assessment Dressing Type: line sutured, occlusive dressing applied, secured with tape and wrist guard applied. Complications no Circ/Move/Sens Assessment: normal and unchanged. Patient Tolerance: patient tolerated the procedure well with no apparent complications          Current medications:  Scheduled Meds:buPROPion XL, 150 mg, Oral, Q PM  gabapentin, 100 mg, Oral, TID  heparin (porcine), 5,000 Units, Subcutaneous, Q8H  insulin lispro, 2-7 Units, Subcutaneous, 4x Daily AC & at Bedtime  levothyroxine, 100 mcg, Oral, Daily  lisinopril, 2.5 mg, Oral, Q24H      Continuous Infusions:   PRN Meds:.  acetaminophen    dextrose    dextrose    glucagon (human recombinant)    methocarbamol    nitroglycerin    ondansetron ODT **OR** ondansetron    oxyCODONE    Assessment & Plan   Assessment & Plan     Active Hospital Problems    Diagnosis  POA    **Lung nodule s/p Robotic right upper lobectomy 10/1/24 [R91.1]  Unknown    Lung nodule [R91.1]  Yes      Resolved Hospital Problems   No resolved problems to display.        Brief Hospital Course to date:  Maria Dolores Sandoval is a 59 y.o. female with PMH PCOS, Pre diabetes, MELISA, obesity BMI 37.42, HTN, hypothryoidism, depression, allergies presenting with right upper lobe carcinoid tumor s/p thoracoscopic surgery, right upper lobectomy with mediastinal lymph node dissection. Pt reports using metformin for PCOS and continued it when she was diagnosed with prediabetes. Later, she changed her diet and lost 50 lbs and was able to stop the metformin. A1C this admission 5.4. Pt reports episodes of hypoglycemia. Patient seen on the AM of 10/3, resting in  bed, chest tube has been removed, plans for possible d/c to home soon per CT surgery notes. Patient encouraged to use IS and to take home, still feels SOA at times with exertion. No other acute changes at this time.         Assessment/Plan  Right upper lobe carcinoid tumor   - s/p thoracoscopic surgery, right upper lobectomy with mediastinal lymph node dissection.   - management per CTS  - CT removed on the AM of 10/3, plans for repeat CXR later today, possible d/c home soon per CTS notes   - Patient with some mild dizziness on the AM of 10/3, continue to follow with nursing staff, follow orthostatic blood pressures      PCOS  Prediabetes  - A1C 5.4  - currently on no home meds  - AC/HS while in the hospital with SSI if needed     MELISA  - CPAP     Obesity BMI 37.42  - complicates all aspects of care     HTN  - lisinopril     Hypothyroidism  - levothyroxine     Depression  - wellbutrin    Expected Discharge Location and Transportation: Home   Expected Discharge   Expected Discharge Date: 10/4/2024; Expected Discharge Time:      VTE Prophylaxis:  Pharmacologic & mechanical VTE prophylaxis orders are present.         AM-PAC 6 Clicks Score (PT): 23 (10/03/24 7250)    CODE STATUS:   Code Status and Medical Interventions: CPR (Attempt to Resuscitate); Full Support   Ordered at: 10/01/24 2058     Code Status (Patient has no pulse and is not breathing):    CPR (Attempt to Resuscitate)     Medical Interventions (Patient has pulse or is breathing):    Full Support       AMBER Klein, DO  10/03/24

## 2024-10-03 NOTE — CASE MANAGEMENT/SOCIAL WORK
Case Management Discharge Note      Final Note: Patient has orders for discharge. Spoke with patient at bedside who reports her spouse will come to transport. No needs verbalized. Patient plan is to discharge home today via car with family to transport.         Selected Continued Care - Admitted Since 9/30/2024       Destination    No services have been selected for the patient.                Durable Medical Equipment    No services have been selected for the patient.                Dialysis/Infusion    No services have been selected for the patient.                Home Medical Care    No services have been selected for the patient.                Therapy    No services have been selected for the patient.                Community Resources    No services have been selected for the patient.                Community & DME    No services have been selected for the patient.                         Final Discharge Disposition Code: 01 - home or self-care

## 2024-10-03 NOTE — PAYOR COMM NOTE
"Auth# [id number]     LUDMILA Morgan, RN  Utilization Review  Phone [phone]  Fax [phone]    [place] [address]         Maria Dolores Wooten (59 y.o. Female)       Date of Birth   1965    Social Security Number       Address   [address] [address]    Home Phone   [phone]    MRN   [id number]       Druze   Taoist    Marital Status                               Admission Date   9/30/24    Admission Type   Elective    Admitting Provider   Christian Rao MD    Attending Provider   Christian Rao MD    Department, Room/Bed   [facility] 4H, S468/1       Discharge Date       Discharge Disposition       Discharge Destination                                 Attending Provider: Christian Rao MD    Allergies: Codeine, Adhesive Tape, Chlorhexidine    Isolation: None   Infection: None   Code Status: CPR    Ht: 165.1 cm (65\")   Wt: 102 kg (224 lb 13.9 oz)    Admission Cmt: None   Principal Problem: Lung nodule s/p Robotic right upper lobectomy 10/1/24 [R91.1]                   Active Insurance as of 9/30/2024       Primary Coverage       Payor Plan Insurance Group Employer/Plan Group    [employer] BLUE CROSS [employer] BLUE CROSS BLUE [employer]IELD PPO [id number]       Payor Plan Address Payor Plan Phone Number Payor Plan Fax Number Effective Dates    [address] [phone]  1/1/2017 - None Entered    [place] [address]         Subscriber Name Subscriber Birth Date Member ID       BENNY WOOTEN 10/22/1960 [id number]                     Emergency Contacts        (Rel.) Home Phone Work Phone Mobile Phone    Benny Wooten (Spouse) [phone] -- [phone]              Current Facility-Administered Medications   Medication Dose Route Frequency Provider Last Rate Last Admin    acetaminophen (TYLENOL) tablet 650 mg  650 mg Oral Q4H PRN Yessy Galicia PA-C   650 mg at 10/03/24 0736    buPROPion XL " (WELLBUTRIN XL) 24 hr tablet 150 mg  150 mg Oral Q PM Yessy Galicia PA-C   150 mg at 10/02/24 1643    dextrose (D50W) (25 g/50 mL) IV injection 25 g  25 g Intravenous Q15 Min PRN Verona Lo MD        dextrose (GLUTOSE) oral gel 15 g  15 g Oral Q15 Min PRN Verona Lo MD        gabapentin (NEURONTIN) capsule 100 mg  100 mg Oral TID Christian Rao MD   100 mg at 10/03/24 0817    glucagon (GLUCAGEN) injection 1 mg  1 mg Intramuscular Q15 Min PRN Verona Lo MD        heparin (porcine) 5000 UNIT/ML injection 5,000 Units  5,000 Units Subcutaneous Q8H Yessy Galicia PA-C   5,000 Units at 10/03/24 0552    Insulin Lispro (humaLOG) injection 2-7 Units  2-7 Units Subcutaneous 4x Daily AC & at Bedtime Verona Lo MD        levothyroxine (SYNTHROID, LEVOTHROID) tablet 100 mcg  100 mcg Oral Daily Yessy Galicia PA-C   100 mcg at 10/03/24 0817    lisinopril (PRINIVIL,ZESTRIL) tablet 2.5 mg  2.5 mg Oral Q24H Yessy Galicia PA-C   2.5 mg at 10/03/24 0816    methocarbamol (ROBAXIN) tablet 750 mg  750 mg Oral BID PRN Christian Rao MD   750 mg at 10/03/24 0932    nitroglycerin (NITROSTAT) SL tablet 0.4 mg  0.4 mg Sublingual Q5 Min PRN Yessy Galicia PA-C        ondansetron ODT (ZOFRAN-ODT) disintegrating tablet 4 mg  4 mg Oral Q6H PRN Yessy Galicia PA-C        Or    ondansetron (ZOFRAN) injection 4 mg  4 mg Intravenous Q6H PRN Yessy Galicia PA-C        oxyCODONE (ROXICODONE) immediate release tablet 5 mg  5 mg Oral Q4H PRN Christian Rao MD   5 mg at 10/03/24 0932     Lab Results (last 48 hours)       Procedure Component Value Units Date/Time    POC Glucose Once [[id number]]  (Abnormal) Collected: 10/03/24 1145    Specimen: Blood Updated: 10/03/24 1146     Glucose 141 mg/dL     POC Glucose Once [[id number]]  (Normal) Collected: 10/03/24 0745    Specimen: Blood Updated: 10/03/24 0746     Glucose 91 mg/dL     Basic Metabolic Panel [[id number]]  (Abnormal) Collected:  10/03/24 0605    Specimen: Blood Updated: 10/03/24 0651     Glucose 104 mg/dL      BUN 12 mg/dL      Creatinine 0.81 mg/dL      Sodium 143 mmol/L      Potassium 3.9 mmol/L      Comment: Slight hemolysis detected by analyzer. Result may be falsely elevated.        Chloride 111 mmol/L      CO2 23.0 mmol/L      Calcium 8.1 mg/dL      BUN/Creatinine Ratio 14.8     Anion Gap 9.0 mmol/L      eGFR 83.7 mL/min/1.73     Narrative:      GFR Normal >60  Chronic Kidney Disease <60  Kidney Failure <15      CBC (No Diff) [[id number]]  (Abnormal) Collected: 10/03/24 0605    Specimen: Blood Updated: 10/03/24 0640     WBC 10.84 10*3/mm3      RBC 4.69 10*6/mm3      Hemoglobin 13.2 g/dL      Hematocrit 40.6 %      MCV 86.6 fL      MCH 28.1 pg      MCHC 32.5 g/dL      RDW 14.5 %      RDW-SD 45.6 fl      MPV 9.7 fL      Platelets 258 10*3/mm3     POC Glucose Once [[id number]]  (Abnormal) Collected: 10/02/24 2031    Specimen: Blood Updated: 10/02/24 2042     Glucose 139 mg/dL     POC Glucose Once [[id number]]  (Normal) Collected: 10/02/24 1656    Specimen: Blood Updated: 10/02/24 1658     Glucose 130 mg/dL     Basic Metabolic Panel [[id number]]  (Abnormal) Collected: 10/02/24 0618    Specimen: Blood Updated: 10/02/24 0659     Glucose 190 mg/dL      BUN 13 mg/dL      Creatinine 0.88 mg/dL      Sodium 138 mmol/L      Potassium 3.8 mmol/L      Chloride 104 mmol/L      CO2 22.0 mmol/L      Calcium 8.2 mg/dL      BUN/Creatinine Ratio 14.8     Anion Gap 12.0 mmol/L      eGFR 75.8 mL/min/1.73     Narrative:      GFR Normal >60  Chronic Kidney Disease <60  Kidney Failure <15      Tissue Pathology Exam [[id number]] Collected: 10/01/24 1741    Specimen: Tissue from Lymph Node; Tissue from Lymph Node; Tissue from Lymph Node; Tissue from Lymph Node; Tissue from Lymph Node; Tissue from Lung, Right Upper Lobe Updated: 10/02/24 0643     Case Report --     Surgical Pathology Report                         Case: [id number]                                   Authorizing Provider:  Christian Rao MD         Collected:           10/01/2024 06:55 PM          Ordering Location:     Psychiatric   Received:            10/01/2024 07:15 PM                                 OR                                                                           Pathologist:           Charles Pettit MD                                                            Intraop:               Charles Pettit MD                                                            Specimen:    Lung, Right Upper Lobe, RIGHT UPPER LOBE FOR FROZEN WITH MARGINS                            Intraoperative Consultation --     Frozen section: Verbal report given to Dr. Rao via speakerphone on 10/1/2024 at 19:15 EDT.    FROZEN SECTION DIAGNOSIS: Bronchovascular margin negative for malignancy.  (GJK)  Stains used for Immediate Evaluation are acceptable.              CBC & Differential [[id number]]  (Abnormal) Collected: 10/02/24 0618    Specimen: Blood Updated: 10/02/24 0632    Narrative:      The following orders were created for panel order CBC & Differential.  Procedure                               Abnormality         Status                     ---------                               -----------         ------                     CBC Auto Differential[[id number]]        Abnormal            Final result                 Please view results for these tests on the individual orders.    CBC Auto Differential [[id number]]  (Abnormal) Collected: 10/02/24 0618    Specimen: Blood Updated: 10/02/24 0632     WBC 12.46 10*3/mm3      RBC 4.72 10*6/mm3      Hemoglobin 13.3 g/dL      Hematocrit 40.1 %      MCV 85.0 fL      MCH 28.2 pg      MCHC 33.2 g/dL      RDW 13.7 %      RDW-SD 43.1 fl      MPV 9.2 fL      Platelets 251 10*3/mm3      Neutrophil % 85.4 %      Lymphocyte % 6.4 %      Monocyte % 7.5 %      Eosinophil % 0.1 %      Basophil % 0.2 %      Immature Grans % 0.4 %      Neutrophils, Absolute 10.64 10*3/mm3       Lymphocytes, Absolute 0.80 10*3/mm3      Monocytes, Absolute 0.93 10*3/mm3      Eosinophils, Absolute 0.01 10*3/mm3      Basophils, Absolute 0.03 10*3/mm3      Immature Grans, Absolute 0.05 10*3/mm3      nRBC 0.0 /100 WBC           Imaging Results (Last 48 Hours)       Procedure Component Value Units Date/Time    XR Chest 1 View [[id number]] Resulted: 10/03/24 1342     Updated: 10/03/24 1324    XR Chest 1 View [[id number]] Collected: 10/03/24 0756     Updated: 10/03/24 0802    Narrative:      XR CHEST 1 VW    Date of Exam: 10/3/2024 3:52 AM EDT    Indication: postop    Comparison: Chest radiograph 10/2/2024.    Findings:  Postsurgical changes of right upper lobectomy. Unchanged position of right chest tube terminating over the right apex. Cardiomediastinal silhouette is unchanged. Low lung volumes with left greater than right bibasilar atelectasis. Slightly decreased   small airspace opacity in the right mid/upper lung suggestive of improving atelectasis. Slightly decreased, trace right pneumothorax. No left pneumothorax. Osseous structures are unchanged.      Impression:      Impression:  Right upper lobectomy with decreased/trace right pneumothorax and right chest tube in place.      Electronically Signed: Gagan Glass MD    10/3/2024 7:59 AM EDT    Workstation ID: [id number]    XR Chest 1 View [[id number]] Collected: 10/02/24 0814     Updated: 10/02/24 0822    Narrative:      XR CHEST 1 VW    Date of Exam: 10/2/2024 2:24 AM EDT    Indication: postop    Comparison: Chest radiograph 10/1/2024.    Findings:  Postsurgical changes of right upper lobectomy with unchanged position of right chest tube. Cardiomediastinal silhouette is unchanged. Low lung volumes. Unchanged scattered airspace opacities in the right lung and left lung base. No sizable pleural   effusion. Small right pneumothorax. No distinct left pneumothorax. Osseous structures are unchanged.      Impression:      Impression:  Right upper lobectomy with small  right pneumothorax and right chest tube in place.    Similar scattered airspace opacities in the right lung and left lung base, some or all of which is suspected to represent atelectasis.      Electronically Signed: Gagan Glass MD    10/2/2024 8:19 AM EDT    Workstation ID: [id number]    XR Chest 1 View [[id number]] Collected: 10/01/24 2037     Updated: 10/01/24 2044    Narrative:      XR CHEST 1 VW    Date of Exam: 10/1/2024 8:17 PM EDT    Indication: postop    Comparison: Chest radiograph 2024    Findings:  Right-sided chest tube projects over the right lung apex.    Mediastinum: Interval postoperative changes.    Lungs: Postoperative changes of right upper lobectomy and mediastinal lymph node dissection. Left greater right basal opacities.    Pleura: No convincing pneumothorax or pleural effusion.    Bones and soft tissues: No acute osseous abnormality.        Impression:      Impression:  Interval postoperative changes with right-sided chest tube in place.    Left greater than right basal opacities which may represent atelectasis. Aspiration or pneumonia to be excluded clinically.      Electronically Signed: Ricardo Irene    10/1/2024 8:40 PM EDT    Workstation ID: [id number]             Physician Progress Notes (last 48 hours)        NATALI Klein DO at 10/03/24 0950              [facility] Medicine Services  PROGRESS NOTE    Patient Name: Maria Dolores Sandoval  : 1965  MRN: [id number]    Date of Admission: 2024  Primary Care Physician: Christiano Lamb DO    Subjective   Subjective     CC:  Med Management     HPI:  Patient is a 59-year-old female seen and examined by me this a.m., laying in bed she is resting, she has had chest tube removed and is now on room air.  She reports she feels short of breath still at times especially with exertion.  Patient also reports some mild dizziness as well this a.m. no other new acute complaints or problems, upon chart review  appears patient may be discharged home soon from CT surgery      Objective   Objective     Vital Signs:   Temp:  [98 °F (36.7 °C)-99.2 °F (37.3 °C)] 99 °F (37.2 °C)  Heart Rate:  [] 87  Resp:  [17-18] 18  BP: (118-144)/(72-89) 130/86     Physical Exam:  Constitutional: No acute distress, awake, alert, currently resting in bed, on RA, frail apppearing  HENT: NCAT, mucous membranes moist  Respiratory:Decreased BS bilaterally, no rhonchi or wheezing, respiratory effort normal   Cardiovascular: RRR, no murmurs, rubs, or gallops  Gastrointestinal: Positive bowel sounds, soft, nontender, nondistended  Musculoskeletal: No bilateral ankle edema  Psychiatric: Appropriate affect, cooperative  Neurologic: Oriented x 3, strength symmetric in all extremities, Cranial Nerves grossly intact to confrontation, speech clear  Skin: No rashes      Results Reviewed:  LAB RESULTS:      Lab 10/03/24  0605 10/02/24  0618 10/01/24  0926 09/27/24  0826   WBC 10.84* 12.46* 5.94 5.80   HEMOGLOBIN 13.2 13.3 13.5 13.9   HEMATOCRIT 40.6 40.1 41.7 42.7   PLATELETS 258 251 245 263   NEUTROS ABS  --  10.64*  --  3.54   IMMATURE GRANS (ABS)  --  0.05  --  0.02   LYMPHS ABS  --  0.80  --  1.34   MONOS ABS  --  0.93*  --  0.55   EOS ABS  --  0.01  --  0.29   MCV 86.6 85.0 87.1 86.1   PROTIME  --   --   --  12.6         Lab 10/03/24  0605 10/02/24  0618 10/01/24  0926 09/27/24  0826   SODIUM 143 138 141 140   POTASSIUM 3.9 3.8 4.2 3.8   CHLORIDE 111* 104 105 104   CO2 23.0 22.0 25.0 25.0   ANION GAP 9.0 12.0 11.0 11.0   BUN 12 13 15 12   CREATININE 0.81 0.88 0.90 1.00   EGFR 83.7 75.8 73.8 65.0   GLUCOSE 104* 190* 84 89   CALCIUM 8.1* 8.2* 8.9 8.5*   HEMOGLOBIN A1C  --   --   --  5.40         Lab 09/27/24  0826   TOTAL PROTEIN 6.8   ALBUMIN 4.2   ALT (SGPT) 18   AST (SGOT) 29   BILIRUBIN 0.4   INDIRECT BILIRUBIN 0.2   BILIRUBIN DIRECT 0.2   ALK PHOS 86         Lab 09/27/24  0826   PROTIME 12.6   INR 0.94             Lab 10/01/24  0926  09/27/24  0826   ABO TYPING A A   RH TYPING Positive Positive   ANTIBODY SCREEN Negative Negative         Brief Urine Lab Results       None            Microbiology Results Abnormal       None            XR Chest 1 View    Result Date: 10/3/2024  XR CHEST 1 VW Date of Exam: 10/3/2024 3:52 AM EDT Indication: postop Comparison: Chest radiograph 10/2/2024. Findings: Postsurgical changes of right upper lobectomy. Unchanged position of right chest tube terminating over the right apex. Cardiomediastinal silhouette is unchanged. Low lung volumes with left greater than right bibasilar atelectasis. Slightly decreased small airspace opacity in the right mid/upper lung suggestive of improving atelectasis. Slightly decreased, trace right pneumothorax. No left pneumothorax. Osseous structures are unchanged.     Impression: Impression: Right upper lobectomy with decreased/trace right pneumothorax and right chest tube in place. Electronically Signed: Gagan Glass MD  10/3/2024 7:59 AM EDT  Workstation ID: [id number]    XR Chest 1 View    Result Date: 10/2/2024  XR CHEST 1 VW Date of Exam: 10/2/2024 2:24 AM EDT Indication: postop Comparison: Chest radiograph 10/1/2024. Findings: Postsurgical changes of right upper lobectomy with unchanged position of right chest tube. Cardiomediastinal silhouette is unchanged. Low lung volumes. Unchanged scattered airspace opacities in the right lung and left lung base. No sizable pleural effusion. Small right pneumothorax. No distinct left pneumothorax. Osseous structures are unchanged.     Impression: Impression: Right upper lobectomy with small right pneumothorax and right chest tube in place. Similar scattered airspace opacities in the right lung and left lung base, some or all of which is suspected to represent atelectasis. Electronically Signed: Gagan Glass MD  10/2/2024 8:19 AM EDT  Workstation ID: [id number]    XR Chest 1 View    Result Date: 10/1/2024  XR CHEST 1 VW Date of Exam: 10/1/2024  8:17 PM EDT Indication: postop Comparison: Chest radiograph 9/27/2024 Findings: Right-sided chest tube projects over the right lung apex. Mediastinum: Interval postoperative changes. Lungs: Postoperative changes of right upper lobectomy and mediastinal lymph node dissection. Left greater right basal opacities. Pleura: No convincing pneumothorax or pleural effusion. Bones and soft tissues: No acute osseous abnormality.     Impression: Impression: Interval postoperative changes with right-sided chest tube in place. Left greater than right basal opacities which may represent atelectasis. Aspiration or pneumonia to be excluded clinically. Electronically Signed: Ricardo Irene  10/1/2024 8:40 PM EDT  Workstation ID: [id number]    Arterial Line    Result Date: 10/1/2024  Farzad Pritchett MD     10/1/2024  2:13 PM Arterial Line Pre-sedation assessment completed: 10/1/2024 2:05 PM Patient reassessed immediately prior to procedure Patient location during procedure: pre-op Start time: 10/1/2024 2:05 PM Stop Time:10/1/2024 2:10 PM  Line placed for hemodynamic monitoring. Performed By Anesthesiologist: Farzad Pritchett MD Preanesthetic Checklist Completed: patient identified, IV checked, site marked, risks and benefits discussed, surgical consent, monitors and equipment checked, pre-op evaluation and timeout performed Arterial Line Prep  Sterile Tech: cap, gloves and sterile barriers Prep: ChloraPrep Patient monitoring: blood pressure monitoring, continuous pulse oximetry and EKG Arterial Line Procedure Laterality:right Location:  radial artery Catheter size: 20 G Guidance: palpation technique Number of attempts: 1 Successful placement: yes Post Assessment Dressing Type: line sutured, occlusive dressing applied, secured with tape and wrist guard applied. Complications no Circ/Move/Sens Assessment: normal and unchanged. Patient Tolerance: patient tolerated the procedure well with no apparent complications          Current  medications:  Scheduled Meds:buPROPion XL, 150 mg, Oral, Q PM  gabapentin, 100 mg, Oral, TID  heparin (porcine), 5,000 Units, Subcutaneous, Q8H  insulin lispro, 2-7 Units, Subcutaneous, 4x Daily AC & at Bedtime  levothyroxine, 100 mcg, Oral, Daily  lisinopril, 2.5 mg, Oral, Q24H      Continuous Infusions:   PRN Meds:.  acetaminophen    dextrose    dextrose    glucagon (human recombinant)    methocarbamol    nitroglycerin    ondansetron ODT **OR** ondansetron    oxyCODONE    Assessment & Plan   Assessment & Plan     Active Hospital Problems    Diagnosis  POA    **Lung nodule s/p Robotic right upper lobectomy 10/1/24 [R91.1]  Unknown    Lung nodule [R91.1]  Yes      Resolved Hospital Problems   No resolved problems to display.        Brief Hospital Course to date:  Maria Dolores Sandoval is a 59 y.o. female with PMH PCOS, Pre diabetes, MELISA, obesity BMI 37.42, HTN, hypothryoidism, depression, allergies presenting with right upper lobe carcinoid tumor s/p thoracoscopic surgery, right upper lobectomy with mediastinal lymph node dissection. Pt reports using metformin for PCOS and continued it when she was diagnosed with prediabetes. Later, she changed her diet and lost 50 lbs and was able to stop the metformin. A1C this admission 5.4. Pt reports episodes of hypoglycemia. Patient seen on the AM of 10/3, resting in bed, chest tube has been removed, plans for possible d/c to home soon per CT surgery notes. Patient encouraged to use IS and to take home, still feels SOA at times with exertion. No other acute changes at this time.         Assessment/Plan  Right upper lobe carcinoid tumor   - s/p thoracoscopic surgery, right upper lobectomy with mediastinal lymph node dissection.   - management per CTS  - CT removed on the AM of 10/3, plans for repeat CXR later today, possible d/c home soon per CTS notes   - Patient with some mild dizziness on the AM of 10/3, continue to follow with nursing staff, follow orthostatic blood pressures       PCOS  Prediabetes  - A1C 5.4  - currently on no home meds  - AC/HS while in the hospital with SSI if needed     MELISA  - CPAP     Obesity BMI 37.42  - complicates all aspects of care     HTN  - lisinopril     Hypothyroidism  - levothyroxine     Depression  - wellbutrin    Expected Discharge Location and Transportation: Home   Expected Discharge   Expected Discharge Date: 10/4/2024; Expected Discharge Time:      VTE Prophylaxis:  Pharmacologic & mechanical VTE prophylaxis orders are present.         AM-PAC 6 Clicks Score (PT): 23 (10/03/24 0736)    CODE STATUS:   Code Status and Medical Interventions: CPR (Attempt to Resuscitate); Full Support   Ordered at: 10/01/24 2058     Code Status (Patient has no pulse and is not breathing):    CPR (Attempt to Resuscitate)     Medical Interventions (Patient has pulse or is breathing):    Full Support       AMBER Klein DO  10/03/24        Electronically signed by NATALI Klein DO at 10/03/24 1209       Kassie Figueroa APRN at 10/03/24 0800          [facility] Cardiothoracic Surgery In-Patient Progress Note     LOS: 3 days      POD # 2 s/p Robotic RUL    Subjective  No complaints. Sitting up in chair.    Objective  Vital Signs  Temp:  [98 °F (36.7 °C)-99.2 °F (37.3 °C)] 99 °F (37.2 °C)  Heart Rate:  [] 94  Resp:  [17-18] 18  BP: (118-144)/(72-89) 128/89    Physical Exam:   General Appearance: alert, appears stated age and cooperative   Lungs: clear to auscultation, respirations regular, respirations even, and respirations unlabored   Heart: regular rhythm & normal rate, normal S1, S2, no murmur, no gallop, no rub, and no click   Skin: Incision c/d/I   CT: 230 cc/24 hrs, no air leak  Output by Drain (mL) 10/02/24 0701 - 10/02/24 1900 10/02/24 1901 - 10/03/24 0700 10/03/24 0701 - 10/03/24 0800 Range Total   Chest Tube 1 Right Pleural 170 60  230        Results     Results from last 7 days   Lab Units 10/03/24  0605   WBC 10*3/mm3 10.84*   HEMOGLOBIN g/dL 13.2    HEMATOCRIT % 40.6   PLATELETS 10*3/mm3 258     Results from last 7 days   Lab Units 10/03/24  0605   SODIUM mmol/L 143   POTASSIUM mmol/L 3.9   CHLORIDE mmol/L 111*   CO2 mmol/L 23.0   BUN mg/dL 12   CREATININE mg/dL 0.81   GLUCOSE mg/dL 104*   CALCIUM mg/dL 8.1*     Imaging Results (Last 24 Hours)       Procedure Component Value Units Date/Time    XR Chest 1 View [[id number]] Resulted: 10/03/24 0352     Updated: 10/03/24 0440    XR Chest 1 View [[id number]] Collected: 10/02/24 0814     Updated: 10/02/24 0822    Narrative:      XR CHEST 1 VW    Date of Exam: 10/2/2024 2:24 AM EDT    Indication: postop    Comparison: Chest radiograph 10/1/2024.    Findings:  Postsurgical changes of right upper lobectomy with unchanged position of right chest tube. Cardiomediastinal silhouette is unchanged. Low lung volumes. Unchanged scattered airspace opacities in the right lung and left lung base. No sizable pleural   effusion. Small right pneumothorax. No distinct left pneumothorax. Osseous structures are unchanged.      Impression:      Impression:  Right upper lobectomy with small right pneumothorax and right chest tube in place.    Similar scattered airspace opacities in the right lung and left lung base, some or all of which is suspected to represent atelectasis.      Electronically Signed: Gagan Glass MD    10/2/2024 8:19 AM EDT    Workstation ID: [id number]            Assessment  POD # 2 s/p Robotic RUL    Plan   D/C chest tubes, D/C home if postpull CXR satisfactory  Daily CXR  Ambulate  Pulmonary Toilet: IS q1 hr while awake  Await final pathology    STALIN Chaparro  10/03/24  08:00 EDT    Electronically signed by Kassie Figueroa APRN at 10/03/24 0932       Christian Rao MD at 10/02/24 0753          [facility] Cardiothoracic Surgery In-Patient Progress Note     LOS: 2 days      POD # 1 s/p Robotic RUL    Subjective  No complaints. No acute events. On 2L NC.    Objective  Vital Signs  Temp:  [97.5 °F (36.4  °C)-99 °F (37.2 °C)] 99 °F (37.2 °C)  Heart Rate:  [] 92  Resp:  [14-18] 15  BP: ()/(62-88) 117/73    Physical Exam:   General Appearance: alert, appears stated age and cooperative   Lungs: clear to auscultation, respirations regular, respirations even, and respirations unlabored   Heart: regular rhythm & normal rate, normal S1, S2, no murmur, no gallop, no rub, and no click   Skin: Incision c/d/I   CT: Small air leak with cough 260 cc/12 hours  Output by Drain (mL) 10/01/24 0701 - 10/01/24 1900 10/01/24 1901 - 10/02/24 0700 10/02/24 0701 - 10/02/24 0754 Range Total   Chest Tube 1 Right Pleural  260  260        Results     Results from last 7 days   Lab Units 10/02/24  0618   WBC 10*3/mm3 12.46*   HEMOGLOBIN g/dL 13.3   HEMATOCRIT % 40.1   PLATELETS 10*3/mm3 251     Results from last 7 days   Lab Units 10/02/24  0618   SODIUM mmol/L 138   POTASSIUM mmol/L 3.8   CHLORIDE mmol/L 104   CO2 mmol/L 22.0   BUN mg/dL 13   CREATININE mg/dL 0.88   GLUCOSE mg/dL 190*   CALCIUM mg/dL 8.2*     Imaging Results (Last 24 Hours)       Procedure Component Value Units Date/Time    XR Chest 1 View [[id number]] Resulted: 10/02/24 0225     Updated: 10/02/24 0526    XR Chest 1 View [[id number]] Collected: 10/01/24 2037     Updated: 10/01/24 2044    Narrative:      XR CHEST 1 VW    Date of Exam: 10/1/2024 8:17 PM EDT    Indication: postop    Comparison: Chest radiograph 9/27/2024    Findings:  Right-sided chest tube projects over the right lung apex.    Mediastinum: Interval postoperative changes.    Lungs: Postoperative changes of right upper lobectomy and mediastinal lymph node dissection. Left greater right basal opacities.    Pleura: No convincing pneumothorax or pleural effusion.    Bones and soft tissues: No acute osseous abnormality.        Impression:      Impression:  Interval postoperative changes with right-sided chest tube in place.    Left greater than right basal opacities which may represent atelectasis.  Aspiration or pneumonia to be excluded clinically.      Electronically Signed: Ricardo Irene    10/1/2024 8:40 PM EDT    Workstation ID: [id number]            Assessment  POD # 1 s/p Robotic RUL    Plan   Continue chest tube to -10   D/C IV fluids  Daily CXR  Ambulate  Pulmonary Toilet: IS q1 hr while awake  Await final pathology    Christian Rao MD  10/02/24  07:53 EDT    Electronically signed by Christian Rao MD at 10/02/24 1747          Consult Notes (last 48 hours)        Tory Condon APRN at 10/02/24 1208        Consult Orders    1. Inpatient Hospitalist Consult [[id number]] ordered by Kassie Figueroa APRN at 10/02/24 0974                      [facility] Medicine Services  CONSULT NOTE      Patient Name: Maria Dolores Sandoval  : 1965  MRN: [id number]    Primary Care Physician: Christiano Lamb DO  Provider requesting consultation: Christian Rao MD    Subjective   Subjective     Reason for Consultation:  Med management    HPI:  Maria Dolores Sandoval is a 59 y.o. female PMH PCOS, Pre diabetes, MELISA, obesity BMI 37.42, HTN, hypothryoidism, depression, allergies presenting with right upper lobe carcinoid tumor s/p thoracoscopic surgery, right upper lobectomy with mediastinal lymph node dissection. Pt reports using metformin for PCOS and continued it when she was diagnosed with prediabetes. Later, she changed her diet and lost 50 lbs and was able to stop the metformin. A1C this admission 5.4. Pt reports episodes of hypoglycemia.       Review of Systems  Gen- No fevers, chills  CV- No chest pain, palpitations  Resp- No cough, dyspnea  GI- No N/V/D, abd pain  MS- (+) upper back pain    Otherwise complete ROS reviewed and is negative except as mentioned in the HPI.    Past Medical History:   Diagnosis Date    Arthritis     Back pain     Cancer     lung    Depression     Diabetes mellitus     no meds currently; checks blood sugars at home occasioannly    Diarrhea     Diarrhea  following gastrointestinal surgery     since cholecystectomy     Disease of thyroid gland     hypothyroidism     Fatty liver 2005    Dr. Gómez -[facility]    Fatty liver     GERD (gastroesophageal reflux disease)     Headache     Hiatal hernia     still present    History of anemia     due to heavy periods     History of depression     History of migraine     early adulthood after a concussion-none recently     Hypertension     IBS (irritable bowel syndrome)     possible    Insomnia     Murmur     dx cardiologist heard    Polycystic disease, ovaries     Presenile cataract     not ripe yet    Scoliosis     Sleep apnea with use of continuous positive airway pressure (CPAP)     auto range of 6-16    Wears glasses        Past Surgical History:   Procedure Laterality Date    BRONCHOSCOPY N/A 10/1/2024    Procedure: BRONCHOSCOPY;  Surgeon: Christian Rao MD;  Location:  [facility];  Service: Robotics - DaVinci;  Laterality: N/A;    BRONCHOSCOPY WITH ION ROBOTIC ASSIST N/A 08/30/2024    Procedure: BRONCHOSCOPY NAVIGATION WITH ENDOBRONCHIAL ULTRASOUND AND ION ROBOT;  Surgeon: Christian Condon MD;  Location:  [facility];  Service: Robotics - Pulmonary;  Laterality: N/A;    CHOLECYSTECTOMY  2014    COLONOSCOPY  2017    polyps remoed     D & C HYSTEROSCOPY ENDOMETRIAL ABLATION N/A 08/30/2017    Procedure: HYSTEROSCOPY DILATATION AND CURETTAGE, ENDOMETRIAL ABLATION, POSSIBLE REMOVAL OF LEIOMYOMATA;  Surgeon: Veronica Riely MD;  Location:  [facility];  Service:     D & C HYSTEROSCOPY MYOSURE N/A 08/30/2017    Procedure: DILATATION AND CURETTAGE HYSTEROSCOPY WITH MYOSURE;  Surgeon: Veronica Riley MD;  Location:  [facility];  Service:     DIAGNOSTIC LAPAROSCOPY  2007    removed uterine septum     ENDOSCOPY      HERNIA REPAIR  2014    incisional hernia - possible mesh in place    LIVER BIOPSY  2012    LYMPH NODE DISSECTION N/A 10/1/2024    Procedure: MEDIASTINAL LYMPH NODE DISSECTION WITH DAVINCI  ROBOT;  Surgeon: Christian Rao MD;  Location:  [facility];  Service: Robotics - DaVinci;  Laterality: N/A;    THORACOSCOPY WITH WEDGE RESECTION Right 10/1/2024    Procedure: THORASCOPIC VIDEO ASSISTED RIGHT UPPER LOBECTOMY, INTERCOSTAL NERVE BLOCKS;  Surgeon: Christian Rao MD;  Location:  [facility];  Service: Robotics - DaVinci;  Laterality: Right;    TOTAL LAPAROSCOPIC HYSTERECTOMY N/A 11/03/2017    Procedure: TOTAL LAPAROSCOPIC HYSTERECTOMY, BILATERAL SALPINGO OOPHORECTOMY WITH DAVINCI ROBOT;  Surgeon: Ni Faulkner MD;  Location:  [facility];  Service:     UMBILICAL HERNIA REPAIR      mesh in place    VAGINA SURGERY      removal of polyps    WISDOM TOOTH EXTRACTION         Family History: family history includes Breast cancer (age of onset: 69) in her paternal aunt; Cirrhosis in her father; Colon cancer in her maternal uncle; Diabetes in her mother; Heart disease in her mother; Ovarian cancer (age of onset: 30) in her sister. Otherwise pertinent FHx was reviewed and unremarkable.     Social History:  reports that she has never smoked. She has never used smokeless tobacco. She reports that she does not drink alcohol and does not use drugs.    Medications:  Medications Prior to Admission   Medication Sig Dispense Refill Last Dose    buPROPion XL (WELLBUTRIN XL) 150 MG 24 hr tablet Take 1 tablet by mouth Every Evening.   9/29/2024    calcium carbonate (TUMS) 500 MG chewable tablet Chew 1 tablet As Needed for Indigestion or Heartburn.   Past Week    levothyroxine (SYNTHROID, LEVOTHROID) 100 MCG tablet Take 1 tablet by mouth Daily.   9/29/2024    lisinopril (PRINIVIL,ZESTRIL) 2.5 MG tablet 1 tablet Every Night.   9/29/2024    OneTouch Ultra test strip    9/29/2024       Scheduled Meds:buPROPion XL, 150 mg, Oral, Q PM  gabapentin, 100 mg, Oral, TID  heparin (porcine), 5,000 Units, Subcutaneous, Q8H  insulin lispro, 2-7 Units, Subcutaneous, 4x Daily AC & at Bedtime  levothyroxine, 100 mcg, Oral, Daily  lisinopril,  2.5 mg, Oral, Q24H      Continuous Infusions:   PRN Meds:.  acetaminophen    dextrose    dextrose    glucagon (human recombinant)    methocarbamol    Morphine    nitroglycerin    ondansetron ODT **OR** ondansetron    oxyCODONE    Allergies   Allergen Reactions    Codeine GI Intolerance     vomiting    Adhesive Tape Unknown - Low Severity     Surgical tape and bandaids adhesives   - long exposure     Chlorhexidine Unknown - Low Severity     Possible-  had reaction to bandaid after surgery dosent know if prep caused issues also or not        Objective   Objective     Vital Signs:   Temp:  [97.5 °F (36.4 °C)-99 °F (37.2 °C)] 98 °F (36.7 °C)  Heart Rate:  [] 102  Resp:  [14-18] 17  BP: ()/(62-83) 118/72     Physical Exam  Constitutional: Awake, alert, NAD  HENT: NCAT, mucous membranes moist  Respiratory: dim RUL, clear, nonlabored Chest tube (+)   Cardiovascular: RRR, no murmurs, rubs, or gallops  Gastrointestinal: Positive bowel sounds, soft, nontender, nondistended  Musculoskeletal: No bilateral ankle edema  Psychiatric: Appropriate affect, cooperative  Neurologic: Oriented x 3, VÁSQUEZ,  speech clear  Skin: No rashes      Results Reviewed:  I have personally reviewed current lab, radiology, and data and agree.    Results from last 7 days   Lab Units 10/02/24  0618 10/01/24  0926 09/27/24  0826   WBC 10*3/mm3 12.46*   < > 5.80   HEMOGLOBIN g/dL 13.3   < > 13.9   HEMATOCRIT % 40.1   < > 42.7   PLATELETS 10*3/mm3 251   < > 263   INR   --   --  0.94    < > = values in this interval not displayed.     Results from last 7 days   Lab Units 10/02/24  0618 10/01/24  0926 09/27/24  0826   SODIUM mmol/L 138   < > 140   POTASSIUM mmol/L 3.8   < > 3.8   CHLORIDE mmol/L 104   < > 104   CO2 mmol/L 22.0   < > 25.0   BUN mg/dL 13   < > 12   CREATININE mg/dL 0.88   < > 1.00   GLUCOSE mg/dL 190*   < > 89   CALCIUM mg/dL 8.2*   < > 8.5*   ALK PHOS U/L  --   --  86   ALT (SGPT) U/L  --   --  18   AST (SGOT) U/L  --   --  29    <  "> = values in this interval not displayed.     Estimated Creatinine Clearance: 81.5 mL/min (by C-G formula based on SCr of 0.88 mg/dL).  Brief Urine Lab Results       None          No results found for: \"BNP\"    Microbiology Results Abnormal       None            Imaging Results (Last 24 Hours)       Procedure Component Value Units Date/Time    XR Chest 1 View [[id number]] Collected: 10/02/24 0814     Updated: 10/02/24 0822    Narrative:      XR CHEST 1 VW    Date of Exam: 10/2/2024 2:24 AM EDT    Indication: postop    Comparison: Chest radiograph 10/1/2024.    Findings:  Postsurgical changes of right upper lobectomy with unchanged position of right chest tube. Cardiomediastinal silhouette is unchanged. Low lung volumes. Unchanged scattered airspace opacities in the right lung and left lung base. No sizable pleural   effusion. Small right pneumothorax. No distinct left pneumothorax. Osseous structures are unchanged.      Impression:      Impression:  Right upper lobectomy with small right pneumothorax and right chest tube in place.    Similar scattered airspace opacities in the right lung and left lung base, some or all of which is suspected to represent atelectasis.      Electronically Signed: Gagan Glass MD    10/2/2024 8:19 AM EDT    Workstation ID: [id number]    XR Chest 1 View [[id number]] Collected: 10/01/24 2037     Updated: 10/01/24 2044    Narrative:      XR CHEST 1 VW    Date of Exam: 10/1/2024 8:17 PM EDT    Indication: postop    Comparison: Chest radiograph 9/27/2024    Findings:  Right-sided chest tube projects over the right lung apex.    Mediastinum: Interval postoperative changes.    Lungs: Postoperative changes of right upper lobectomy and mediastinal lymph node dissection. Left greater right basal opacities.    Pleura: No convincing pneumothorax or pleural effusion.    Bones and soft tissues: No acute osseous abnormality.        Impression:      Impression:  Interval postoperative changes with " right-sided chest tube in place.    Left greater than right basal opacities which may represent atelectasis. Aspiration or pneumonia to be excluded clinically.      Electronically Signed: Ricardo Irene    10/1/2024 8:40 PM EDT    Workstation ID: [id number]              Assessment & Plan   Assessment / Plan     Active Hospital Problems    Diagnosis  POA    **Incidental lung nodule, greater than or equal to 8mm [R91.1]  Unknown     Incidental 1.6 cm right upper lobe well-circumscribed nodule on chest CT 7/29/2024 also noted on chest x-ray that same day in ER.  Not present on chest x-ray dated 2017      Lung nodule [R91.1]  Yes         Hospital Course to date:  fermin Sandoval is a 59 y.o. female PMH PCOS, Pre diabetes, MELISA, obesity BMI 37.42, HTN, hypothryoidism, depression, allergies presenting with right upper lobe carcinoid tumor s/p thoracoscopic surgery, right upper lobectomy with mediastinal lymph node dissection. Pt reports using metformin for PCOS and continued it when she was diagnosed with prediabetes. Later, she changed her diet and lost 50 lbs and was able to stop the metformin. A1C this admission 5.4. Pt reports episodes of hypoglycemia.       Assessment/Plan  Right upper lobe carcinoid tumor   - s/p thoracoscopic surgery, right upper lobectomy with mediastinal lymph node dissection.   - management per CTS    PCOS  Prediabetes  - A1C 5.4  - currently on no home meds  - AC/HS while in the hospital with SSI if needed    MELISA  - CPAP    Obesity BMI 37.42  - complicates all aspects of care    HTN  - lisinopril    Hypothyroidism  - levothyroxine    Depression  - wellbutrin      Thank you for allowing [facility] Medicine Service to provide consultative care for your patient, we will continue to follow while clinically appropriate.    Electronically signed by STALIN Vale, 10/02/24, 12:08 PM EDT.            Electronically signed by Tory Condon APRN at 10/02/24 7359

## 2024-10-03 NOTE — PROGRESS NOTES
[facility] Cardiothoracic Surgery In-Patient Progress Note     LOS: 3 days      POD # 2 s/p Robotic RUL    Subjective  No complaints. Sitting up in chair.    Objective  Vital Signs  Temp:  [98 °F (36.7 °C)-99.2 °F (37.3 °C)] 99 °F (37.2 °C)  Heart Rate:  [] 94  Resp:  [17-18] 18  BP: (118-144)/(72-89) 128/89    Physical Exam:   General Appearance: alert, appears stated age and cooperative   Lungs: clear to auscultation, respirations regular, respirations even, and respirations unlabored   Heart: regular rhythm & normal rate, normal S1, S2, no murmur, no gallop, no rub, and no click   Skin: Incision c/d/I   CT: 230 cc/24 hrs, no air leak with cough  Output by Drain (mL) 10/02/24 0701 - 10/02/24 1900 10/02/24 1901 - 10/03/24 0700 10/03/24 0701 - 10/03/24 0800 Range Total   Chest Tube 1 Right Pleural 170 60  230        Results     Results from last 7 days   Lab Units 10/03/24  0605   WBC 10*3/mm3 10.84*   HEMOGLOBIN g/dL 13.2   HEMATOCRIT % 40.6   PLATELETS 10*3/mm3 258     Results from last 7 days   Lab Units 10/03/24  0605   SODIUM mmol/L 143   POTASSIUM mmol/L 3.9   CHLORIDE mmol/L 111*   CO2 mmol/L 23.0   BUN mg/dL 12   CREATININE mg/dL 0.81   GLUCOSE mg/dL 104*   CALCIUM mg/dL 8.1*     Imaging Results (Last 24 Hours)       Procedure Component Value Units Date/Time    XR Chest 1 View [[id number]] Resulted: 10/03/24 0352     Updated: 10/03/24 0440    XR Chest 1 View [[id number]] Collected: 10/02/24 0814     Updated: 10/02/24 0822    Narrative:      XR CHEST 1 VW    Date of Exam: 10/2/2024 2:24 AM EDT    Indication: postop    Comparison: Chest radiograph 10/1/2024.    Findings:  Postsurgical changes of right upper lobectomy with unchanged position of right chest tube. Cardiomediastinal silhouette is unchanged. Low lung volumes. Unchanged scattered airspace opacities in the right lung and left lung base. No sizable pleural   effusion. Small right pneumothorax. No distinct left pneumothorax. Osseous structures  are unchanged.      Impression:      Impression:  Right upper lobectomy with small right pneumothorax and right chest tube in place.    Similar scattered airspace opacities in the right lung and left lung base, some or all of which is suspected to represent atelectasis.      Electronically Signed: Gagan Glass MD    10/2/2024 8:19 AM EDT    Workstation ID: [id number]            Assessment  POD # 2 s/p Robotic RUL    Plan   D/C chest tubes, D/C home if postpull CXR satisfactory  Daily CXR  Ambulate  Pulmonary Toilet: IS q1 hr while awake  Await final pathology    Christian Rao MD  10/03/24  08:00 EDT

## 2024-10-03 NOTE — PLAN OF CARE
Problem: Adult Inpatient Plan of Care  Goal: Plan of Care Review  Outcome: Progressing  Goal: Patient-Specific Goal (Individualized)  Outcome: Progressing  Goal: Absence of Hospital-Acquired Illness or Injury  Outcome: Progressing  Intervention: Identify and Manage Fall Risk  Recent Flowsheet Documentation  Taken 10/3/2024 0400 by Ara Basurto RN  Safety Promotion/Fall Prevention:   assistive device/personal items within reach   clutter free environment maintained   fall prevention program maintained   safety round/check completed  Taken 10/3/2024 0200 by Ara Basurto RN  Safety Promotion/Fall Prevention:   assistive device/personal items within reach   clutter free environment maintained   fall prevention program maintained   safety round/check completed  Taken 10/3/2024 0000 by Ara Basurto RN  Safety Promotion/Fall Prevention:   assistive device/personal items within reach   clutter free environment maintained   fall prevention program maintained   safety round/check completed  Taken 10/2/2024 2200 by Ara Basurto RN  Safety Promotion/Fall Prevention:   clutter free environment maintained   fall prevention program maintained   safety round/check completed   nonskid shoes/slippers when out of bed  Taken 10/2/2024 2000 by Ara Basurto RN  Safety Promotion/Fall Prevention: safety round/check completed  Intervention: Prevent Skin Injury  Recent Flowsheet Documentation  Taken 10/3/2024 0400 by Ara Basurto RN  Body Position: position changed independently  Taken 10/3/2024 0200 by Ara Basurto RN  Body Position: position changed independently  Taken 10/3/2024 0000 by Ara Basurto RN  Body Position: position changed independently  Taken 10/2/2024 2200 by Ara Basurto RN  Body Position:   head facing, left   position changed independently  Taken 10/2/2024 2000 by Ara Basurto RN  Body Position: sitting up in bed  Skin Protection: tubing/devices free from skin  contact  Intervention: Prevent and Manage VTE (Venous Thromboembolism) Risk  Recent Flowsheet Documentation  Taken 10/3/2024 0000 by Ara Basurto RN  Activity Management: activity encouraged  Taken 10/2/2024 2200 by Ara Basurto RN  Activity Management: activity encouraged  Taken 10/2/2024 2000 by Ara Basurto RN  Activity Management: activity encouraged  VTE Prevention/Management: sequential compression devices on  Range of Motion: active ROM (range of motion) encouraged  Intervention: Prevent Infection  Recent Flowsheet Documentation  Taken 10/3/2024 0400 by Aar Basurto RN  Infection Prevention:   cohorting utilized   equipment surfaces disinfected   hand hygiene promoted   rest/sleep promoted   single patient room provided  Taken 10/3/2024 0200 by Ara Basurto RN  Infection Prevention:   cohorting utilized   equipment surfaces disinfected   hand hygiene promoted   rest/sleep promoted   single patient room provided  Taken 10/3/2024 0000 by Ara Basurto RN  Infection Prevention:   cohorting utilized   equipment surfaces disinfected   hand hygiene promoted   personal protective equipment utilized   single patient room provided  Goal: Optimal Comfort and Wellbeing  Outcome: Progressing  Intervention: Monitor Pain and Promote Comfort  Recent Flowsheet Documentation  Taken 10/3/2024 0400 by Ara Basurto RN  Pain Management Interventions: no interventions per patient request  Taken 10/3/2024 0000 by Ara Basurto RN  Pain Management Interventions: no interventions per patient request  Taken 10/2/2024 2200 by Ara Basurto RN  Pain Management Interventions: no interventions per patient request  Taken 10/2/2024 2000 by Ara Basurto RN  Pain Management Interventions: no interventions per patient request  Goal: Readiness for Transition of Care  Outcome: Progressing     Problem: Hypertension Comorbidity  Goal: Blood Pressure in Desired Range  Outcome: Progressing     Problem:  Obstructive Sleep Apnea Risk or Actual Comorbidity Management  Goal: Unobstructed Breathing During Sleep  Outcome: Progressing  Intervention: Monitor and Manage Obstructive Sleep Apnea  Recent Flowsheet Documentation  Taken 10/2/2024 2000 by Ara Basurot RN  NPPV/CPAP Maintenance: home PAP equipment/settings used     Problem: Fall Injury Risk  Goal: Absence of Fall and Fall-Related Injury  Outcome: Progressing  Intervention: Promote Injury-Free Environment  Recent Flowsheet Documentation  Taken 10/3/2024 0400 by Ara Basurto RN  Safety Promotion/Fall Prevention:   assistive device/personal items within reach   clutter free environment maintained   fall prevention program maintained   safety round/check completed  Taken 10/3/2024 0200 by Ara Basurto RN  Safety Promotion/Fall Prevention:   assistive device/personal items within reach   clutter free environment maintained   fall prevention program maintained   safety round/check completed  Taken 10/3/2024 0000 by Ara Basurto RN  Safety Promotion/Fall Prevention:   assistive device/personal items within reach   clutter free environment maintained   fall prevention program maintained   safety round/check completed  Taken 10/2/2024 2200 by Ara Basurto RN  Safety Promotion/Fall Prevention:   clutter free environment maintained   fall prevention program maintained   safety round/check completed   nonskid shoes/slippers when out of bed  Taken 10/2/2024 2000 by Ara Basurto RN  Safety Promotion/Fall Prevention: safety round/check completed   Goal Outcome Evaluation:

## 2024-10-03 NOTE — PLAN OF CARE
Goal Outcome Evaluation: Aox4 room air.Discharge teaching was done at bedside. NO needs at this time

## 2024-10-04 LAB
FUNGUS WND CULT: NORMAL
MYCOBACTERIUM SPEC CULT: NORMAL
NIGHT BLUE STAIN TISS: NORMAL

## 2024-10-04 NOTE — OUTREACH NOTE
Prep Survey      Flowsheet Row Responses   [facility] facility patient discharged from? [facility]   Is LACE score < 7 ? No   Eligibility Readm Mgmt   Discharge diagnosis THORACOSCOPY WITH WEDGE RESECTION-LYMPH NODE DISSECTION   Does the patient have one of the following disease processes/diagnoses(primary or secondary)? Cardiothoracic surgery   Does the patient have Home health ordered? No   Is there a DME ordered? No   Prep survey completed? Yes            MEHRDAD FOX - Registered Nurse

## 2024-10-05 ENCOUNTER — NURSE TRIAGE (OUTPATIENT)
Dept: CALL CENTER | Facility: HOSPITAL | Age: 59
End: 2024-10-05
Payer: COMMERCIAL

## 2024-10-06 NOTE — TELEPHONE ENCOUNTER
Reason for Disposition   SEVERE itching (i.e., interferes with sleep, normal activities or school)    Additional Information   Negative: [1] Life-threatening reaction (anaphylaxis) in the past to similar substance (e.g., food, insect bite/sting, chemical, etc.) AND [2] < 2 hours since exposure   Negative: [1] Sudden onset of rash (within last 2 hours) AND [2] difficulty breathing or swallowing   Negative: Shock suspected (e.g., cold/pale/clammy skin, too weak to stand, low BP, rapid pulse)   Negative: Difficult to awaken or acting confused (e.g., disoriented, slurred speech)   Negative: [1] Purple or blood-colored spots or dots AND [2] fever   Negative: Sounds like a life-threatening emergency to the triager   Negative: [1] Drug rash suspected AND [2] started taking new medicine within last 2 weeks  (Exception: Antihistamine, eye drops, ear drops, decongestant or other OTC cough/cold medicines.)   Negative: [1] Chickenpox suspected AND [2] known exposure to chickenpox in past 3 weeks   Negative: Hives suspected   Negative: Insect bites suspected   Negative: [1] Measles suspected AND [2] known exposure to measles in past 3 weeks   Negative: [1] Mpox suspected (e.g., direct skin contact such as sex, recent travel to [place] or [place]) AND [2] symptoms of Mpox (e.g., rash, fever, muscle aches, or swollen lymph nodes)   Negative: [1] At risk for Mpox (men-who-have-sex-with-men) AND [2] possible exposure (e.g., multiple sex partners in past 21 days) AND [3] symptoms of Mpox (e.g., rash, fever, muscle aches, or swollen lymph nodes)   Negative: Swimmer's Itch suspected   Negative: Sunburn suspected   Negative: [1] Bright red, sunburn-like rash AND [2] current tampon use or nasal packing   Negative: [1] Bright red, sunburn-like rash AND [3] wound infection or recent surgery   Negative: [1] Bright red skin AND [2] peels off in sheets   Negative: Stiff neck (can't touch chin to chest)   Negative: Fever   Negative: Joint  "pain or swelling   Negative: Patient sounds very sick or weak to the triager   Negative: [1] Purple or blood-colored rash (spots or dots) AND [2] no fever AND [3] sounds well to triager   Negative: Large or small blisters on skin (i.e., fluid filled bubbles or sacs)   Negative: Bloody crusts on lips or sores in mouth   Negative: Face becomes swollen   Negative: [1] Headache AND [2] no fever    Answer Assessment - Initial Assessment Questions  1. APPEARANCE of RASH: \"Describe the rash.\" (e.g., spots, blisters, raised areas, skin peeling, scaly)      Red pinpoint areas to where EKG leads were, also near where chest tube was  2. SIZE: \"How big are the spots?\" (e.g., tip of pen, eraser, coin; inches, centimeters)      Started as square like size of EKG leads, has spread out some  3. LOCATION: \"Where is the rash located?\"      Abd, under right breast and along side  4. COLOR: \"What color is the rash?\" (Note: It is difficult to assess rash color in people with darker-colored skin. When this situation occurs, simply ask the caller to describe what they see.)      red  5. ONSET: \"When did the rash begin?\"      Thursday  6. FEVER: \"Do you have a fever?\" If Yes, ask: \"What is your temperature, how was it measured, and when did it start?\"      denied  7. ITCHING: \"Does the rash itch?\" If Yes, ask: \"How bad is the itch?\" (Scale 1-10; or mild, moderate, severe)      Yes, 8/10  8. CAUSE: \"What do you think is causing the rash?\"     Thinks from adhesive from EKG leads  9. MEDICINE FACTORS: \"Have you started any new medicines within the last 2 weeks?\" (e.g., antibiotics)       N/a  10. OTHER SYMPTOMS: \"Do you have any other symptoms?\" (e.g., dizziness, headache, sore throat, joint pain)        denied  11. PREGNANCY: \"Is there any chance you are pregnant?\" \"When was your last menstrual period?\"        N/a    Protocols used: Rash or Redness - Widespread-ADULT-AH    "

## 2024-10-07 LAB
CYTO UR: NORMAL
LAB AP CASE REPORT: NORMAL
LAB AP CLINICAL INFORMATION: NORMAL
LAB AP SPECIAL STAINS: NORMAL
Lab: NORMAL
PATH REPORT.FINAL DX SPEC: NORMAL
PATH REPORT.GROSS SPEC: NORMAL

## 2024-10-08 ENCOUNTER — READMISSION MANAGEMENT (OUTPATIENT)
Dept: CALL CENTER | Facility: HOSPITAL | Age: 59
End: 2024-10-08
Payer: COMMERCIAL

## 2024-10-08 NOTE — DISCHARGE SUMMARY
CTS Discharge Summary    Patient Care Team:  Christiano Lamb DO as PCP - General (Family Medicine)  Radha Menchaca, RN as Nurse Navigator  Consults:   Consults       Date and Time Order Name Status Description    10/2/2024  9:38 AM Inpatient Hospitalist Consult Completed             Date of Admission: 9/30/2024 10:25 AM  Date of Discharge:  10/3/2024    Discharge Diagnosis  Past Medical History:   Diagnosis Date    Arthritis     Back pain     Cancer     lung    Depression     Diabetes mellitus     no meds currently; checks blood sugars at home occasioannly    Diarrhea     Diarrhea following gastrointestinal surgery     since cholecystectomy     Disease of thyroid gland     hypothyroidism     Fatty liver 2005    Dr. [facility]    Fatty liver     GERD (gastroesophageal reflux disease)     Headache     Hiatal hernia     still present    History of anemia     due to heavy periods     History of depression     History of migraine     early adulthood after a concussion-none recently     Hypertension     IBS (irritable bowel syndrome)     possible    Insomnia     Murmur     dx cardiologist heard    Polycystic disease, ovaries     Presenile cataract     not ripe yet    Scoliosis     Sleep apnea with use of continuous positive airway pressure (CPAP)     auto range of 6-16    Wears glasses      Patient Active Problem List   Diagnosis    Abnormal uterine bleeding    Complex atypical endometrial hyperplasia    Obesity (BMI 30-39.9)    MELISA (obstructive sleep apnea)    Acquired hypothyroidism    Depression    Environmental allergies    Post-operative state    Lung nodule s/p Robotic right upper lobectomy 10/1/24    Lung nodule         Incidental lung nodule, greater than or equal to 8mm [R91.1]  Lung nodule [R91.1]     Procedures Performed  Procedure(s):  BRONCHOSCOPY  THORASCOPIC VIDEO ASSISTED RIGHT UPPER LOBE WEDGE RESECTION POSSIBLE RIGHT UPPER LOBECTOMY  MEDIASTINAL LYMPH NODE DISSECTION WITH  CARRIE ROBOT       Operative Pathology:  Final Diagnosis   LYMPH NODE, STATION 4R, BIOPSY:  One benign lymph node (0/1).  2.   LYMPH NODE, STATION 7, BIOPSY:  One benign lymph node (0/1).  3.   LYMPH NODES, STATION 8, BIOPSIES:  Benign lymph node fragments  4.   LYMPH NODES, STATION 2R, BIOPSIES:  Benign lymph node fragments  5.   LYMPH NODE, STATION 11, BIOPSY:  One benign lymph node (0/1).  6.   LUNG, RIGHT UPPER LOBECTOMY:  Typical carcinoid/neuroendocrine tumor, grade 1  One benign lymph node (0/1).     LUNG CARCINOMA  SPECIMEN TYPE/PROCEDURE: Lobectomy  LATERALITY (Left or Right): Right  TUMOR FOCALITY: Single focus  TUMOR SITE: Upper lobe of lung  TUMOR SIZE : 1.6 cm  HISTOLOGIC TYPE: Typical carcinoid/neuroendocrine tumor, grade 1  VISCERAL PLEURAL INVASION: Not identified  DIRECT INVASION OF ADJACENT STRUCTURES: Not identified  MARGIN: All margins negative for invasive carcinoma  LYMPHVASCULAR INVASION: Not identified  CLOSEST MARGIN (Specify): Parenchymal  DISTANCE OF INVASIVE CARCINOMA FROM CLOSEST MARGIN: 0.8 cm  REGIONAL LYMPH NODE STATUS: All regional lymph nodes negative for tumor  NUMBER OF LYMPH NODES EXAMINED: At least 6  ROSY STATIONS EXAMINED: 4R, 7, 8, 2R, 11  TREATMENT EFFECT: No known presurgical therapy  AJCC PATHOLOGIC STAGE:  (COMPLETED BY PATHOLOGIST, BASED ONLY ON TISSUE FINDINGS, MORE EXTENSIVE DISEASE MAY NOT BE KNOWN TO THE PATHOLOGIST)  pT= 1b  pN= 0   pM= not applicable  AJCC PATHOLOGIC STAGE:  IA2       History of Present Illness  Patient is a 59 y.o. female referred to Dr. Rao per Pulmonology Dr. Marcelo Condon/Enedina GANT for consideration surgical resection right upper lobe neuroendocrine tumor. Tumor was incidentally noted on imaging @ ER visit 7/29/24 during chest pain evaluation. PMH: Lifelong non-smoker, MELISA on CPAP, type 2 DM, RLS, HTN, GERD, fatty liver disease, hypothyroid, obesity, and RUL neuroendocrine tumor. NM PET 8/16/2024 demonstrated 1.6 cm RUL nodule  with SUV 5.53. She underwent bronchoscopy 8/30/2024. Tissue sampling confirmed well differentiated neuroendocrine tumor. Current symptoms include cough, chest heaviness, and fatigue. Recent weight loss has been intentional with diet/exercise.       Hospital Course  Patient was taken to the operating room on 10/1/24 for bronchoscopy, right robotic assisted thoracoscopy surgery with [employer] robot with right upper lobectomy and mediastinal lymph node dissection.  Patient was extubated in the operating room and transported to recovery in stable condition.  POD 1: Chest tube to -10  POD 2:  Chest tubes removed. Patient met discharge criteria and was discharged to home.      Discharge Medications     Discharge Medications        New Medications        Instructions Start Date   HYDROcodone-acetaminophen 7.5-325 MG per tablet  Commonly known as: Norco   1 tablet, Oral, Every 6 Hours PRN             Continue These Medications        Instructions Start Date   buPROPion  MG 24 hr tablet  Commonly known as: WELLBUTRIN XL   Take 1 tablet by mouth Every Evening.      calcium carbonate 500 MG chewable tablet  Commonly known as: TUMS   1 tablet, Oral, As Needed      levothyroxine 100 MCG tablet  Commonly known as: SYNTHROID, LEVOTHROID   100 mcg, Oral, Daily      lisinopril 2.5 MG tablet  Commonly known as: PRINIVIL,ZESTRIL   1 tablet Every Night.      OneTouch Ultra test strip  Generic drug: glucose blood                Discharge Diet:   Diet Instructions       Diet: Cardiac Diets; Healthy Heart (2-3 Na+); Regular Texture (IDDSI 7); Thin (IDDSI 0)      Discharge Diet: Cardiac Diets    Cardiac Diet: Healthy Heart (2-3 Na+)    Texture: Regular Texture (IDDSI 7)    Fluid Consistency: Thin (IDDSI 0)            Activity at Discharge:   Activity Instructions       Activity as Tolerated      Bathing Restrictions      You may shower    Type of Restriction: Bathing    Bathing Restrictions: No Tub Bath    Driving Restrictions       Type of Restriction: Driving    Driving Restrictions: No Driving Until Next Appointment    Lifting Restrictions      Type of Restriction: Lifting    Lifting Restrictions: Lifting Restriction (Indicate Limit)    Weight Limit (Pounds): 10    Length of Lifting Restriction: until seen at next follow-up appointment            Follow-up Appointments  Future Appointments   Date Time Provider Department [place]   11/13/2024  8:30 AM Ivania Henry APRN [facility]   11/15/2024  8:30 AM Rajiv Rucker MD [facility]        WOUND CARE:Keep incisions clean and dry at all times. Take a shower daily. Do NOT take a tub bath or submerge yourself in hot tubs, swimming pools, or any other body of water until seen by the cardiac surgeon in your follow-up visit. Clean  your body and incisions daily with Dial soap and water. Always use a clean washcloth. Do not scrub your incision(s). Do not re-use dressings on your incision(s). Do not use any lotions, creams, oils, powders, antibiotic ointment (i.e., Neosporin), peroxide, alcohol, or iodine UNLESS told to do by the cardiac surgeon.  DO NOT remove griselda/suture.  Dr. Rao's office will manage staples/suture.  Please call office at [phone] with any questions or concerns.    Adriana Conway PA-C  10/08/24  12:20 EDT

## 2024-10-08 NOTE — OUTREACH NOTE
CT Surgery Week 1 Survey      Flowsheet Row Responses   [facility] patient discharged from? [place]   Does the patient have one of the following disease processes/diagnoses(primary or secondary)? Cardiothoracic surgery   Week 1 attempt successful? Yes   Call start time 1216   Call end time 1230   Discharge diagnosis THORACOSCOPY WITH WEDGE RESECTION-LYMPH NODE DISSECTION   Person spoke with today (if not patient) and relationship Patient   Medication alerts for this patient Norco   Meds reviewed with patient/caregiver? Yes   Is the patient having any side effects they believe may be caused by any medication additions or changes? No   Does the patient have all medications related to this admission filled (includes all antibiotics, pain medications, cardiac medications, etc.) Yes   Is the patient taking all medications as directed (includes completed medication regime)? Yes   Comments regarding appointments CTS post op appt on 11/13/24.   Does the patient have a primary care provider?  Yes   Does the patient have an appointment scheduled with their C/T surgeon? Yes   Comments regarding PCP PCP--Dr. Christiano Lamb---appt tomorrow, 10/9/24 for hospital f/u.   Has the patient kept scheduled appointments due by today? N/A   Has home health visited the patient within 72 hours of discharge? N/A   Psychosocial issues? No   Comments Patient states she is doing well. She does have a rash from the ECG electrodes from surgery. She is taking Claritin to help ease the itchiness. She states her oxygen levels have been very good.   Did the patient receive a copy of their discharge instructions? Yes   Nursing interventions Reviewed instructions with patient   What is the patient's perception of their health status since discharge? Improving   Nursing interventions Nurse provided patient education   Is the patient/caregiver able to teach back normal signs of recovery? Pain or discomfort at incisional site   Nursing  interventions Reassured on normal signs of recovery   Is the patient /caregiver able to teach back basic post-op care? Continue use of incentive spirometry at least 1 week post discharge, Practice cough and deep breath every 4 hours while awake, Hold pillow to support chest when coughing, Drive as instructed by MD in discharge instructions, Shower daily, No tub bath, swimming, or hot tub until instructed by MD, Use a clean wash cloth and antibacterial bar or liquid soap to clean incisions, If the steri-strips are falling off, it is okay to remove them. (If applicable), Lifting as instructed by MD in discharge instructions   Is the patient/caregiver able to teach back signs and symptoms of incisional infection? Increased redness, swelling or pain at the incisonal site, Increased drainage or bleeding, Incisional warmth, Pus or odor from incision, Fever   Is the patient/caregiver able to teach back steps to recovery at home? Set small, achievable goals for return to baseline health, Rest and rebuild strength, gradually increase activity   If the patient is a current smoker, are they able to teach back resources for cessation? Not a smoker   Is the patient/caregiver able to teach back the hierarchy of who to call/visit for symptoms/problems? PCP, Specialist, Home health nurse, Urgent Care, ED, 911 Yes   Week 1 call completed? Yes   Graduated Yes   Is the patient interested in additional calls from an ambulatory ? No   Would this patient benefit from a Referral to Amb Social Work? No   Wrap up additional comments Patient doing well. Anxious for her pathology report. Advised patient to call Dr. Rao's office to inquire about test results. No other needs or concerns.   Call end time 1230              Alee CURRAN - Registered Nurse

## 2024-10-09 ENCOUNTER — TELEPHONE (OUTPATIENT)
Dept: CARDIAC SURGERY | Facility: CLINIC | Age: 59
End: 2024-10-09
Payer: COMMERCIAL

## 2024-10-09 NOTE — TELEPHONE ENCOUNTER
Patient s/p [facility] VATS with right upper lobectomy on 10/1/2024 with Dr. Rao - she was told that her pathology would be back within 1 week and was anxious to find out these results.   She is doing well from surgical standpoint - pain is bearable and O2 sats staying around 98% on RA but she does get a little winded with speaking.      She is aware of her appt 11/13/24

## 2024-10-11 LAB
FUNGUS WND CULT: NORMAL
MYCOBACTERIUM SPEC CULT: NORMAL
NIGHT BLUE STAIN TISS: NORMAL

## 2024-10-11 NOTE — TELEPHONE ENCOUNTER
Spoke with patient and offered to schedule an appointment next week to go over pathology results.  She said she has already viewed results thru [facility].  She will discuss with  and if she decided she would like to be seen sooner, she will call back.

## 2024-10-25 ENCOUNTER — CONSULT (OUTPATIENT)
Age: 59
End: 2024-10-25
Payer: COMMERCIAL

## 2024-10-25 VITALS
HEIGHT: 65 IN | BODY MASS INDEX: 37.42 KG/M2 | DIASTOLIC BLOOD PRESSURE: 91 MMHG | TEMPERATURE: 97.9 F | HEART RATE: 78 BPM | SYSTOLIC BLOOD PRESSURE: 139 MMHG | OXYGEN SATURATION: 94 %

## 2024-10-25 DIAGNOSIS — R91.1 INCIDENTAL LUNG NODULE, GREATER THAN OR EQUAL TO 8MM: Primary | ICD-10-CM

## 2024-10-25 NOTE — PROGRESS NOTES
DATE OF CONSULTATION: 10/25/2024    REFERRING PHYSICIAN: Christiano Lamb DO    Dear Christiano Phillips, DO  Thank you for asking for my medical advice on this patient. I saw her in the  [place] office on 10/25/2024    REASON FOR CONSULTATION: Lung carcinoid    PROBLEM LIST:   1.  Right upper lobe typical lung carcinoid, T1b N0 M0 stage I A2:  A.  Patient presented with growing nodule right lower lobe  B.  Diagnosed after bronchoscopy with biopsy done August 30, 2024 revealing well-differentiated regarding tumor  C.  Status post right upper lobe resection with lymph node sampling done by Dr. Rao October 1, 2024  D.  Final pathology revealed low-grade well-differentiated neuroendocrine tumor most consistent with typical carcinoid, 1.6 cm in size, clear margins and negative hilar as well as mediastinal nodes.  2.  Hypertension  3.  Hypothyroid    HISTORY OF PRESENT ILLNESS: The patient is a very pleasant 59 y.o.  female   who was in her usual state of health until July 2024.  Patient present with incidental finding of right lung nodule on chest x-ray this referral by CT scan that confirmed the abnormality.  Patient was referred to pulmonary.  She had bronchoscopy with biopsy August 2024 that confirmed well-differentiated low-grade neuroendocrine tumor.  The patient was referred to Dr. Rao who performed thoracotomy with right upper lobe resection and lymph node sampling October 1, 2024.  Final pathology revealed stage I A2 carcinoid.  She was referred to me for further recommendations.    SUBJECTIVE: When I saw the patient today she is here with her .  She is doing fairly well.  Denies any fever or chills.  No wheezing no coughing no diarrhea.    Review of Systems   Constitutional:  Positive for fatigue.   Respiratory: Negative.     Cardiovascular: Negative.    Gastrointestinal:         Heartburn    Musculoskeletal:  Positive for arthralgias.   Allergic/Immunologic: Negative.     Neurological: Negative.    Hematological: Negative.    Psychiatric/Behavioral: Negative.         Past Medical History:   Diagnosis Date    Arthritis     Back pain     Cancer     lung    Depression     Diabetes mellitus     no meds currently; checks blood sugars at home occasioannly    Diarrhea     Diarrhea following gastrointestinal surgery     since cholecystectomy     Disease of thyroid gland     hypothyroidism     Fatty liver 2005    Dr. [facility]mez [facility]    Fatty liver     GERD (gastroesophageal reflux disease)     Headache     Hiatal hernia     still present    History of anemia     due to heavy periods     History of depression     History of migraine     early adulthood after a concussion-none recently     Hypertension     IBS (irritable bowel syndrome)     possible    Insomnia     Murmur     dx cardiologist heard    Polycystic disease, ovaries     Presenile cataract     not ripe yet    Scoliosis     Sleep apnea with use of continuous positive airway pressure (CPAP)     auto range of 6-16    Wears glasses        Social History     Socioeconomic History    Marital status:     Number of children: 1   Tobacco Use    Smoking status: Never    Smokeless tobacco: Never   Vaping Use    Vaping status: Never Used   Substance and Sexual Activity    Alcohol use: No    Drug use: No    Sexual activity: Defer       Family History   Problem Relation Age of Onset    Diabetes Mother     Heart disease Mother     Cirrhosis Father     Ovarian cancer Sister 30        half sister    Colon cancer Maternal Uncle     Breast cancer Paternal Aunt 69       Past Surgical History:   Procedure Laterality Date    BRONCHOSCOPY N/A 10/1/2024    Procedure: BRONCHOSCOPY;  Surgeon: Christian Rao MD;  Location: [facility];  Service: Robotics - DaVinci;  Laterality: N/A;    BRONCHOSCOPY WITH ION ROBOTIC ASSIST N/A 08/30/2024    Procedure: BRONCHOSCOPY NAVIGATION WITH ENDOBRONCHIAL ULTRASOUND AND ION ROBOT;  Surgeon: Christian Condon  MD Santy;  Location:  [facility];  Service: Robotics - Pulmonary;  Laterality: N/A;    CHOLECYSTECTOMY  2014    COLONOSCOPY  2017    polyps remoed     D & C HYSTEROSCOPY ENDOMETRIAL ABLATION N/A 08/30/2017    Procedure: HYSTEROSCOPY DILATATION AND CURETTAGE, ENDOMETRIAL ABLATION, POSSIBLE REMOVAL OF LEIOMYOMATA;  Surgeon: Veronica Riley MD;  Location:  [facility];  Service:     D & C HYSTEROSCOPY MYOSURE N/A 08/30/2017    Procedure: DILATATION AND CURETTAGE HYSTEROSCOPY WITH MYOSURE;  Surgeon: Veronica Riley MD;  Location:  [facility];  Service:     DIAGNOSTIC LAPAROSCOPY  2007    removed uterine septum     ENDOSCOPY      HERNIA REPAIR  2014    incisional hernia - possible mesh in place    LIVER BIOPSY  2012    LYMPH NODE DISSECTION N/A 10/1/2024    Procedure: MEDIASTINAL LYMPH NODE DISSECTION WITH DAVINCI ROBOT;  Surgeon: Christian Rao MD;  Location:  [facility];  Service: Robotics - DaVinci;  Laterality: N/A;    THORACOSCOPY WITH WEDGE RESECTION Right 10/1/2024    Procedure: THORASCOPIC VIDEO ASSISTED RIGHT UPPER LOBECTOMY, INTERCOSTAL NERVE BLOCKS;  Surgeon: Christian Rao MD;  Location:  [facility];  Service: Robotics - DaVinci;  Laterality: Right;    TOTAL LAPAROSCOPIC HYSTERECTOMY N/A 11/03/2017    Procedure: TOTAL LAPAROSCOPIC HYSTERECTOMY, BILATERAL SALPINGO OOPHORECTOMY WITH DAVINCI ROBOT;  Surgeon: Ni Faulkner MD;  Location:  [facility];  Service:     UMBILICAL HERNIA REPAIR      mesh in place    VAGINA SURGERY      removal of polyps    WISDOM TOOTH EXTRACTION         Allergies   Allergen Reactions    Codeine GI Intolerance     vomiting    Adhesive Tape Unknown - Low Severity     Surgical tape and bandaids adhesives   - long exposure     Chlorhexidine Unknown - Low Severity     Possible-  had reaction to bandaid after surgery dosent know if prep caused issues also or not           Current Outpatient Medications:     buPROPion XL (WELLBUTRIN XL) 150 MG 24 hr tablet, Take 1 tablet by  mouth Every Evening., Disp: , Rfl:     calcium carbonate (TUMS) 500 MG chewable tablet, Chew 1 tablet As Needed for Indigestion or Heartburn., Disp: , Rfl:     HYDROcodone-acetaminophen (Norco) 7.5-325 MG per tablet, Take 1 tablet by mouth Every 6 (Six) Hours As Needed for Moderate Pain., Disp: 25 tablet, Rfl: 0    levothyroxine (SYNTHROID, LEVOTHROID) 100 MCG tablet, Take 1 tablet by mouth Daily., Disp: , Rfl:     lisinopril (PRINIVIL,ZESTRIL) 2.5 MG tablet, 1 tablet Every Night., Disp: , Rfl:     OneTouch Ultra test strip, , Disp: , Rfl:     PHYSICAL EXAMINATION:   LMP 10/26/2017   There were no vitals filed for this visit.                ECOG Performance Status: 1 - Symptomatic but completely ambulatory  General Appearance:  alert, cooperative, no apparent distress and appears stated age   Neurologic/Psychiatric: A&O x 3, gait steady, appropriate affect, strength 5/5 in all muscle groups   HEENT:  Normocephalic, without obvious abnormality, mucous membranes moist   Neck: Supple, symmetrical, trachea midline, no adenopathy;  No thyromegaly, masses, or tenderness   Lungs:   Clear to auscultation bilaterally; respirations regular, even, and unlabored bilaterally   Heart:  Regular rate and rhythm, no murmurs appreciated   Abdomen:   Soft, non-tender, non-distended, and no organomegaly   Lymph nodes: No cervical, supraclavicular, inguinal or axillary adenopathy noted   Extremities: Normal, atraumatic; no clubbing, cyanosis, or edema    Skin: No rashes, ulcers, or suspicious lesions noted       No visits with results within 2 Week(s) from this visit.   Latest known visit with results is:   Admission on 09/30/2024, Discharged on 10/03/2024   Component Date Value Ref Range Status    Product Code 10/01/2024 K4820B62   Final    Unit Number 10/01/2024 [id number]   Final    UNIT  ABO 10/01/2024 A   Final    UNIT  RH 10/01/2024 POS   Final    Crossmatch Interpretation 10/01/2024 Compatible   Final    Dispense Status  10/01/2024 RE   Final    Blood Expiration Date 10/01/2024 581939323403   Final    Blood Type Barcode 10/01/2024 6200   Final    Product Code 10/01/2024 B0190N51   Final    Unit Number 10/01/2024 [id number]   Final    UNIT  ABO 10/01/2024 A   Final    UNIT  RH 10/01/2024 POS   Final    Crossmatch Interpretation 10/01/2024 Compatible   Final    Dispense Status 10/01/2024 RE   Final    Blood Expiration Date 10/01/2024 791531057543   Final    Blood Type Barcode 10/01/2024 6200   Final    Glucose 09/30/2024 77  70 - 130 mg/dL Final    Product Code 10/03/2024 C2508B93   Final    Unit Number 10/03/2024 [id number]   Final    UNIT  ABO 10/03/2024 A   Final    UNIT  RH 10/03/2024 POS   Final    Crossmatch Interpretation 10/03/2024 Compatible   Final    Dispense Status 10/03/2024 RE   Final    Blood Expiration Date 10/03/2024 406156206876   Final    Blood Type Barcode 10/03/2024 6200   Final    ABO Type 10/01/2024 A   Final    RH type 10/01/2024 Positive   Final    Antibody Screen 10/01/2024 Negative   Final    T&S Expiration Date 10/01/2024 10/4/2024 11:59:59 PM   Final    WBC 10/01/2024 5.94  3.40 - 10.80 10*3/mm3 Final    RBC 10/01/2024 4.79  3.77 - 5.28 10*6/mm3 Final    Hemoglobin 10/01/2024 13.5  12.0 - 15.9 g/dL Final    Hematocrit 10/01/2024 41.7  34.0 - 46.6 % Final    MCV 10/01/2024 87.1  79.0 - 97.0 fL Final    MCH 10/01/2024 28.2  26.6 - 33.0 pg Final    MCHC 10/01/2024 32.4  31.5 - 35.7 g/dL Final    RDW 10/01/2024 14.1  12.3 - 15.4 % Final    RDW-SD 10/01/2024 45.1  37.0 - 54.0 fl Final    MPV 10/01/2024 9.7  6.0 - 12.0 fL Final    Platelets 10/01/2024 245  140 - 450 10*3/mm3 Final    Glucose 10/01/2024 84  65 - 99 mg/dL Final    BUN 10/01/2024 15  6 - 20 mg/dL Final    Creatinine 10/01/2024 0.90  0.57 - 1.00 mg/dL Final    Sodium 10/01/2024 141  136 - 145 mmol/L Final    Potassium 10/01/2024 4.2  3.5 - 5.2 mmol/L Final    Slight hemolysis detected by analyzer. Result may be falsely elevated.     Chloride 10/01/2024 105  98 - 107 mmol/L Final    CO2 10/01/2024 25.0  22.0 - 29.0 mmol/L Final    Calcium 10/01/2024 8.9  8.6 - 10.5 mg/dL Final    BUN/Creatinine Ratio 10/01/2024 16.7  7.0 - 25.0 Final    Anion Gap 10/01/2024 11.0  5.0 - 15.0 mmol/L Final    eGFR 10/01/2024 73.8  >60.0 mL/min/1.73 Final    Case Report 10/01/2024    Final                    Value:Surgical Pathology Report                         Case: [id number]                                  Authorizing Provider:  Christian Rao MD         Collected:           10/01/2024 05:41 PM          Ordering Location:     [facility]   Received:            10/02/2024 06:41 AM                                 OR                                                                           Pathologist:           Charles Pettit MD                                                            Intraop:               Charles Pettit MD                                                            Specimens:   1) - Lymph Node, STATION 4R FOR PERMANENT                                                           2) - Lymph Node, STATION 7 FOR PERMANENT                                                            3) - Lymph Node, STATION 8 FOR PERMANENT                                                            4) - Lymph Node, STATION 2R FOR PERMANENT                                                                                     5) - Lymph Node, Station 11                                                                         6) - Lung, Right Upper Lobe, RIGHT UPPER LOBE FOR FROZEN WITH MARGINS                      Clinical Information 10/01/2024    Final                    Value:This result contains rich text formatting which cannot be displayed here.    Final Diagnosis 10/01/2024    Final                    Value:This result contains rich text formatting which cannot be displayed here.    Intraoperative Consultation 10/01/2024    Final                     Value:This result contains rich text formatting which cannot be displayed here.    Gross Description 10/01/2024    Final                    Value:This result contains rich text formatting which cannot be displayed here.    Special Stains 10/01/2024    Final                    Value:This result contains rich text formatting which cannot be displayed here.    Microscopic Description 10/01/2024    Final                    Value:This result contains rich text formatting which cannot be displayed here.    Glucose 10/02/2024 190 (H)  65 - 99 mg/dL Final    BUN 10/02/2024 13  6 - 20 mg/dL Final    Creatinine 10/02/2024 0.88  0.57 - 1.00 mg/dL Final    Sodium 10/02/2024 138  136 - 145 mmol/L Final    Potassium 10/02/2024 3.8  3.5 - 5.2 mmol/L Final    Chloride 10/02/2024 104  98 - 107 mmol/L Final    CO2 10/02/2024 22.0  22.0 - 29.0 mmol/L Final    Calcium 10/02/2024 8.2 (L)  8.6 - 10.5 mg/dL Final    BUN/Creatinine Ratio 10/02/2024 14.8  7.0 - 25.0 Final    Anion Gap 10/02/2024 12.0  5.0 - 15.0 mmol/L Final    eGFR 10/02/2024 75.8  >60.0 mL/min/1.73 Final    WBC 10/02/2024 12.46 (H)  3.40 - 10.80 10*3/mm3 Final    RBC 10/02/2024 4.72  3.77 - 5.28 10*6/mm3 Final    Hemoglobin 10/02/2024 13.3  12.0 - 15.9 g/dL Final    Hematocrit 10/02/2024 40.1  34.0 - 46.6 % Final    MCV 10/02/2024 85.0  79.0 - 97.0 fL Final    MCH 10/02/2024 28.2  26.6 - 33.0 pg Final    MCHC 10/02/2024 33.2  31.5 - 35.7 g/dL Final    RDW 10/02/2024 13.7  12.3 - 15.4 % Final    RDW-SD 10/02/2024 43.1  37.0 - 54.0 fl Final    MPV 10/02/2024 9.2  6.0 - 12.0 fL Final    Platelets 10/02/2024 251  140 - 450 10*3/mm3 Final    Neutrophil % 10/02/2024 85.4 (H)  42.7 - 76.0 % Final    Lymphocyte % 10/02/2024 6.4 (L)  19.6 - 45.3 % Final    Monocyte % 10/02/2024 7.5  5.0 - 12.0 % Final    Eosinophil % 10/02/2024 0.1 (L)  0.3 - 6.2 % Final    Basophil % 10/02/2024 0.2  0.0 - 1.5 % Final    Immature Grans % 10/02/2024 0.4  0.0 - 0.5 % Final    Neutrophils, Absolute  10/02/2024 10.64 (H)  1.70 - 7.00 10*3/mm3 Final    Lymphocytes, Absolute 10/02/2024 0.80  0.70 - 3.10 10*3/mm3 Final    Monocytes, Absolute 10/02/2024 0.93 (H)  0.10 - 0.90 10*3/mm3 Final    Eosinophils, Absolute 10/02/2024 0.01  0.00 - 0.40 10*3/mm3 Final    Basophils, Absolute 10/02/2024 0.03  0.00 - 0.20 10*3/mm3 Final    Immature Grans, Absolute 10/02/2024 0.05  0.00 - 0.05 10*3/mm3 Final    nRBC 10/02/2024 0.0  0.0 - 0.2 /100 WBC Final    Glucose 10/02/2024 130  70 - 130 mg/dL Final    Glucose 10/02/2024 139 (H)  70 - 130 mg/dL Final    WBC 10/03/2024 10.84 (H)  3.40 - 10.80 10*3/mm3 Final    RBC 10/03/2024 4.69  3.77 - 5.28 10*6/mm3 Final    Hemoglobin 10/03/2024 13.2  12.0 - 15.9 g/dL Final    Hematocrit 10/03/2024 40.6  34.0 - 46.6 % Final    MCV 10/03/2024 86.6  79.0 - 97.0 fL Final    MCH 10/03/2024 28.1  26.6 - 33.0 pg Final    MCHC 10/03/2024 32.5  31.5 - 35.7 g/dL Final    RDW 10/03/2024 14.5  12.3 - 15.4 % Final    RDW-SD 10/03/2024 45.6  37.0 - 54.0 fl Final    MPV 10/03/2024 9.7  6.0 - 12.0 fL Final    Platelets 10/03/2024 258  140 - 450 10*3/mm3 Final    Glucose 10/03/2024 104 (H)  65 - 99 mg/dL Final    BUN 10/03/2024 12  6 - 20 mg/dL Final    Creatinine 10/03/2024 0.81  0.57 - 1.00 mg/dL Final    Sodium 10/03/2024 143  136 - 145 mmol/L Final    Potassium 10/03/2024 3.9  3.5 - 5.2 mmol/L Final    Slight hemolysis detected by analyzer. Result may be falsely elevated.    Chloride 10/03/2024 111 (H)  98 - 107 mmol/L Final    CO2 10/03/2024 23.0  22.0 - 29.0 mmol/L Final    Calcium 10/03/2024 8.1 (L)  8.6 - 10.5 mg/dL Final    BUN/Creatinine Ratio 10/03/2024 14.8  7.0 - 25.0 Final    Anion Gap 10/03/2024 9.0  5.0 - 15.0 mmol/L Final    eGFR 10/03/2024 83.7  >60.0 mL/min/1.73 Final    Glucose 10/03/2024 91  70 - 130 mg/dL Final    Glucose 10/03/2024 141 (H)  70 - 130 mg/dL Final        XR Chest 1 View    Result Date: 10/3/2024  Narrative: XR CHEST 1 VW Date of Exam: 10/3/2024 12:58 PM EDT Indication:  Right chest tube removal, pneumothorax. Comparison: 10/3/2024 at 0408 hours. Findings: The large bore right chest tube has been removed. The right apical pneumothorax has completely resolved. There are abnormal parenchymal densities in the right chest which are presumably related to the patient's biopsy. The lung volumes are diminished bilaterally. There is also perihilar and basilar subsegmental atelectasis. There are no moderate to large pleural effusions. The heart size is normal. The pulmonary vascular markings are normal. There are chronic age-related changes involving the bony thorax and thoracic aorta.     Impression: Impression: 1. The large bore right chest tube has been removed. The right apical pneumothorax has completely resolved. 2. Abnormal parenchymal densities in the right chest presumably representing post biopsy changes. 3. Low lung volumes with perihilar and basilar subsegmental atelectasis. Electronically Signed: Héctor Walker MD  10/3/2024 1:52 PM EDT  Workstation ID: [id number]    XR Chest 1 View    Result Date: 10/3/2024  Narrative: XR CHEST 1 VW Date of Exam: 10/3/2024 3:52 AM EDT Indication: postop Comparison: Chest radiograph 10/2/2024. Findings: Postsurgical changes of right upper lobectomy. Unchanged position of right chest tube terminating over the right apex. Cardiomediastinal silhouette is unchanged. Low lung volumes with left greater than right bibasilar atelectasis. Slightly decreased small airspace opacity in the right mid/upper lung suggestive of improving atelectasis. Slightly decreased, trace right pneumothorax. No left pneumothorax. Osseous structures are unchanged.     Impression: Impression: Right upper lobectomy with decreased/trace right pneumothorax and right chest tube in place. Electronically Signed: Gagan Glass MD  10/3/2024 7:59 AM EDT  Workstation ID: [id number]    XR Chest 1 View    Result Date: 10/2/2024  Narrative: XR CHEST 1 VW Date of Exam: 10/2/2024 2:24 AM EDT  Indication: postop Comparison: Chest radiograph 10/1/2024. Findings: Postsurgical changes of right upper lobectomy with unchanged position of right chest tube. Cardiomediastinal silhouette is unchanged. Low lung volumes. Unchanged scattered airspace opacities in the right lung and left lung base. No sizable pleural effusion. Small right pneumothorax. No distinct left pneumothorax. Osseous structures are unchanged.     Impression: Impression: Right upper lobectomy with small right pneumothorax and right chest tube in place. Similar scattered airspace opacities in the right lung and left lung base, some or all of which is suspected to represent atelectasis. Electronically Signed: Gagan Glass MD  10/2/2024 8:19 AM EDT  Workstation ID: [id number]    XR Chest 1 View    Result Date: 10/1/2024  Narrative: XR CHEST 1 VW Date of Exam: 10/1/2024 8:17 PM EDT Indication: postop Comparison: Chest radiograph 9/27/2024 Findings: Right-sided chest tube projects over the right lung apex. Mediastinum: Interval postoperative changes. Lungs: Postoperative changes of right upper lobectomy and mediastinal lymph node dissection. Left greater right basal opacities. Pleura: No convincing pneumothorax or pleural effusion. Bones and soft tissues: No acute osseous abnormality.     Impression: Impression: Interval postoperative changes with right-sided chest tube in place. Left greater than right basal opacities which may represent atelectasis. Aspiration or pneumonia to be excluded clinically. Electronically Signed: Ricardo Irene  10/1/2024 8:40 PM EDT  Workstation ID: [id number]    Arterial Line    Result Date: 10/1/2024  Narrative: Farzad Pritchett MD     10/1/2024  2:13 PM Arterial Line Pre-sedation assessment completed: 10/1/2024 2:05 PM Patient reassessed immediately prior to procedure Patient location during procedure: pre-op Start time: 10/1/2024 2:05 PM Stop Time:10/1/2024 2:10 PM  Line placed for hemodynamic monitoring. Performed By  Anesthesiologist: Farzad Pritchett MD Preanesthetic Checklist Completed: patient identified, IV checked, site marked, risks and benefits discussed, surgical consent, monitors and equipment checked, pre-op evaluation and timeout performed Arterial Line Prep  Sterile Tech: cap, gloves and sterile barriers Prep: ChloraPrep Patient monitoring: blood pressure monitoring, continuous pulse oximetry and EKG Arterial Line Procedure Laterality:right Location:  radial artery Catheter size: 20 G Guidance: palpation technique Number of attempts: 1 Successful placement: yes Post Assessment Dressing Type: line sutured, occlusive dressing applied, secured with tape and wrist guard applied. Complications no Circ/Move/Sens Assessment: normal and unchanged. Patient Tolerance: patient tolerated the procedure well with no apparent complications     Spirometry    Result Date: 9/29/2024  Narrative: Pulmonary Function Test Interpretation Maria Dolores Sandoval [phone] 09/29/24 15:56 EDT Spirometry Spirometry demonstrates no airway obstruction. Post Bronchodilator N/A Study Comparison Since the previous study, there has been no change in pulmonary function. Study date: 09/27/2024     Chest X-Ray PA & Lateral    Result Date: 9/27/2024  Narrative: XR CHEST PA AND LATERAL Date of Exam: 9/27/2024 9:02 AM EDT Indication: Pre-Op Cardiac Surgery Comparison: 8/30/2024 Findings: Heart and pulmonary vessels appear within normal limits. Lung fields are clear of acute infiltrates or effusions. There is no pneumothorax.     Impression: Impression: No acute process. Electronically Signed: Felecia Gracia MD  9/27/2024 9:14 AM EDT  Workstation ID: [id number]       DIAGNOSTIC DATA:   Extensive patient medical records including doctor notes, blood work results, pathology report and imaging reports reviewed by me and documented in the patient's chart.      ASSESSMENT: The patient is a very pleasant 59 y.o.  female  with right upper lobe lung carcinoid    PLAN:      1.  Right upper lobe lung carcinoid, T1b N0 M0 stage I A2:  A.  I had a long discussion today with the patient and her  about her  new diagnosis of lung cancer. I did go over the final pathology report in  detail with both of them. I reviewed the patient's documents including refereing provider's notes, lab results, imaging, and path report.   B.  I explained to the patient she does not require adjuvant chemotherapy.  C.  Patient was discussed in the weekly tumor board this week and approached with the multidisciplinary team.  C.  She will follow-up with me in 3 months with repeated scan if ferritin remains stable I will switch her to 4 months then by annually for 2 years then annually thereafter.    2.  Hypertension:  April she will continue Synthroid daily.    3.  Hypothyroid:  A.  She will continue Synthroid daily.    FOLLOW UP: 3 months with CT scan.    Clemente Andrews MD  10/25/2024

## 2024-11-08 NOTE — PROGRESS NOTES
[facility] Cardiothoracic Surgery Office Follow Up Note     Date of Encounter: 2024     Name: Maria Dolores Sandoval  : 1965     Referred By: No ref. provider found  PCP: Christiano Lamb DO    Chief Complaint:    Chief Complaint   Patient presents with    Post-op Follow-up     Hosp D/C Right Upper Lobectomy 10/1/24-Carcinoid       Subjective      History of Present Illness:    Maria Dolores Sandoval is a 59 y.o. female s/p robotic right upper lobectomy with mediastinal lymph node dissection 10/1/2024 per Dr. Rao to treat right upper lobe carcinoid tumor.  PMH: Lifelong non-smoker, MELISA on CPAP, type 2 DM, RLS, HTN, GERD, fatty liver disease, hypothyroid, obesity, and RUL neuroendocrine tumor T1b N0 M0 stage I A2. NM PET 2024 demonstrated 1.6 cm RUL nodule with SUV 5.53. Bronchoscopy biopsy 2024 confirmed well differentiated neuroendocrine tumor size 1.6 cm.  Surgical pathology demonstrated tumor size 1.6 cm with histologic type typical carcinoid grade 1 with clear margins and without lymph vascular invasion.  All regional lymph nodes negative for tumor.  Following uncomplicated surgical intervention, patient underwent chest tube removal POD #2 with discharged home later that day.  No readmissions or ER visits in the interim.  Patient established with medical oncology, Dr. Andrews, 10/25/2024: No adjuvant chemotherapy indicated.  Active surveillance is planned with CT imaging in 3 months per medical oncology.  Patient presents to CT surgery for scheduled 6-week postop visit, suture removal, and CXR.      Review of Systems:  Review of Systems   Constitutional: Negative for chills, decreased appetite, diaphoresis, fever, malaise/fatigue, night sweats, weight gain and weight loss.   HENT:  Negative for hoarse voice.    Eyes:  Negative for blurred vision, double vision and visual disturbance.   Cardiovascular:  Negative for chest pain, claudication, dyspnea on exertion, irregular heartbeat,  leg swelling, near-syncope, orthopnea, palpitations, paroxysmal nocturnal dyspnea and syncope.   Respiratory:  Positive for cough (when using spirometer and with activity). Negative for hemoptysis, shortness of breath, sputum production and wheezing.    Hematologic/Lymphatic: Negative for adenopathy and bleeding problem. Does not bruise/bleed easily.   Skin:  Negative for color change, nail changes, poor wound healing and rash.   Musculoskeletal:  Negative for back pain, falls and muscle cramps.   Gastrointestinal:  Negative for abdominal pain, dysphagia and heartburn.   Genitourinary:  Negative for flank pain.   Neurological:  Positive for light-headedness. Negative for brief paralysis, disturbances in coordination, dizziness, focal weakness, headaches, loss of balance, numbness, paresthesias, sensory change, vertigo and weakness.   Psychiatric/Behavioral:  Negative for depression and suicidal ideas.    Allergic/Immunologic: Negative for persistent infections.       I have reviewed the following portions of the patient's history: problem list, current medications, allergies, past surgical history, past medical history, past social history, past family history, and ROS and confirm it's accurate.    Allergies:  Allergies   Allergen Reactions    Codeine GI Intolerance     vomiting    Adhesive Tape Unknown - Low Severity     Surgical tape and bandaids adhesives   - long exposure     Chlorhexidine Unknown - Low Severity     Possible-  had reaction to bandaid after surgery dosent know if prep caused issues also or not        Medications:      Current Outpatient Medications:     buPROPion XL (WELLBUTRIN XL) 150 MG 24 hr tablet, Take 1 tablet by mouth Every Evening., Disp: , Rfl:     CREATINE PO, Take  by mouth Daily., Disp: , Rfl:     levothyroxine (SYNTHROID, LEVOTHROID) 100 MCG tablet, Take 1 tablet by mouth Daily., Disp: , Rfl:     lisinopril (PRINIVIL,ZESTRIL) 2.5 MG tablet, 1 tablet Every Night., Disp: , Rfl:      MAGNESIUM PO, Take  by mouth Daily., Disp: , Rfl:     OneTouch Ultra test strip, , Disp: , Rfl:     calcium carbonate (TUMS) 500 MG chewable tablet, Chew 1 tablet As Needed for Indigestion or Heartburn. (Patient not taking: Reported on 11/13/2024), Disp: , Rfl:     HYDROcodone-acetaminophen (Norco) 7.5-325 MG per tablet, Take 1 tablet by mouth Every 6 (Six) Hours As Needed for Moderate Pain. (Patient not taking: Reported on 11/13/2024), Disp: 25 tablet, Rfl: 0    History:   Past Medical History:   Diagnosis Date    Arthritis     Back pain     Cancer     lung    Depression     Diabetes mellitus     no meds currently; checks blood sugars at home occasioannly    Diarrhea     Diarrhea following gastrointestinal surgery     since cholecystectomy     Disease of thyroid gland     hypothyroidism     Fatty liver 2005    Dr. [facility]mez [facility]    Fatty liver     GERD (gastroesophageal reflux disease)     Headache     Hiatal hernia     still present    History of anemia     due to heavy periods     History of depression     History of migraine     early adulthood after a concussion-none recently     Hypertension     IBS (irritable bowel syndrome)     possible    Insomnia     Murmur     dx cardiologist heard    Polycystic disease, ovaries     Presenile cataract     not ripe yet    Scoliosis     Sleep apnea with use of continuous positive airway pressure (CPAP)     auto range of 6-16    Wears glasses        Past Surgical History:   Procedure Laterality Date    BRONCHOSCOPY N/A 10/1/2024    Procedure: BRONCHOSCOPY;  Surgeon: Christian Rao MD;  Location: [facility] OR;  Service: Robotics - DaVinci;  Laterality: N/A;    BRONCHOSCOPY WITH ION ROBOTIC ASSIST N/A 08/30/2024    Procedure: BRONCHOSCOPY NAVIGATION WITH ENDOBRONCHIAL ULTRASOUND AND ION ROBOT;  Surgeon: Christian Condon MD;  Location: [facility] ENDOSCOPY;  Service: Robotics - Pulmonary;  Laterality: N/A;    CHOLECYSTECTOMY  2014    COLONOSCOPY  2017    polyps  remoed     D & C HYSTEROSCOPY ENDOMETRIAL ABLATION N/A 08/30/2017    Procedure: HYSTEROSCOPY DILATATION AND CURETTAGE, ENDOMETRIAL ABLATION, POSSIBLE REMOVAL OF LEIOMYOMATA;  Surgeon: Veronica Riley MD;  Location:  [facility];  Service:     D & C HYSTEROSCOPY MYOSURE N/A 08/30/2017    Procedure: DILATATION AND CURETTAGE HYSTEROSCOPY WITH MYOSURE;  Surgeon: Veronica Riley MD;  Location:  [facility] OR;  Service:     DIAGNOSTIC LAPAROSCOPY  2007    removed uterine septum     ENDOSCOPY      HERNIA REPAIR  2014    incisional hernia - possible mesh in place    LIVER BIOPSY  2012    LYMPH NODE DISSECTION N/A 10/1/2024    Procedure: MEDIASTINAL LYMPH NODE DISSECTION WITH DAVINCI ROBOT;  Surgeon: Christian Rao MD;  Location:  [facility];  Service: Robotics - DaVinci;  Laterality: N/A;    THORACOSCOPY WITH WEDGE RESECTION Right 10/1/2024    Procedure: THORASCOPIC VIDEO ASSISTED RIGHT UPPER LOBECTOMY, INTERCOSTAL NERVE BLOCKS;  Surgeon: Christian Rao MD;  Location:  [facility];  Service: Robotics - DaVinci;  Laterality: Right;    TOTAL LAPAROSCOPIC HYSTERECTOMY N/A 11/03/2017    Procedure: TOTAL LAPAROSCOPIC HYSTERECTOMY, BILATERAL SALPINGO OOPHORECTOMY WITH DAVINCI ROBOT;  Surgeon: Ni Faulkner MD;  Location:  [facility];  Service:     UMBILICAL HERNIA REPAIR      mesh in place    VAGINA SURGERY      removal of polyps    WISDOM TOOTH EXTRACTION         Social History     Socioeconomic History    Marital status:     Number of children: 1   Tobacco Use    Smoking status: Never     Passive exposure: Never    Smokeless tobacco: Never   Vaping Use    Vaping status: Never Used   Substance and Sexual Activity    Alcohol use: No    Drug use: No    Sexual activity: Defer        Family History   Problem Relation Age of Onset    Diabetes Mother     Heart disease Mother     Cirrhosis Father     Ovarian cancer Sister 30        half sister    Colon cancer Maternal Uncle     Breast cancer Paternal Aunt 69  "      Objective   Physical Exam:  Vitals:    11/13/24 0807   BP: 138/90   BP Location: Left arm   Patient Position: Sitting   Pulse: 74   Temp: 97.6 °F (36.4 °C)   SpO2: 98%   Weight: 105 kg (232 lb)   Height: 165.1 cm (65\")  Comment: patient reported      Body mass index is 38.61 kg/m².    Physical Exam  Vitals reviewed.   Constitutional:       General: She is not in acute distress.     Appearance: She is not toxic-appearing.   HENT:      Head: Normocephalic and atraumatic.   Eyes:      General: Lids are normal.      Conjunctiva/sclera: Conjunctivae normal.      Pupils: Pupils are equal, round, and reactive to light.   Cardiovascular:      Rate and Rhythm: Normal rate and regular rhythm.      Heart sounds: S1 normal and S2 normal. No murmur heard.  Pulmonary:      Effort: Pulmonary effort is normal. No respiratory distress.      Breath sounds: Examination of the right-lower field reveals decreased breath sounds. Decreased breath sounds present. No wheezing, rhonchi or rales.      Comments: Well healed right VATS sites.  Suture removed from most anterior site without complication.    Musculoskeletal:         General: Normal range of motion.      Cervical back: Normal range of motion and neck supple.   Lymphadenopathy:      Upper Body:      Right upper body: No supraclavicular or axillary adenopathy.      Left upper body: No supraclavicular or axillary adenopathy.   Skin:     General: Skin is warm and dry.      Capillary Refill: Capillary refill takes less than 2 seconds.   Neurological:      General: No focal deficit present.      Mental Status: She is alert and oriented to person, place, and time.   Psychiatric:         Attention and Perception: Attention normal.         Mood and Affect: Mood normal.         Speech: Speech normal.         Behavior: Behavior is cooperative.         Imaging/Labs:   CXR 11/13/2024: personally reviewed  No PTX.  Improved but not resolved right hilar/ basilar atelectasis.  Radiology " review pending    Final Diagnosis   LYMPH NODE, STATION 4R, BIOPSY:  One benign lymph node (0/1).  2.   LYMPH NODE, STATION 7, BIOPSY:  One benign lymph node (0/1).  3.   LYMPH NODES, STATION 8, BIOPSIES:  Benign lymph node fragments  4.   LYMPH NODES, STATION 2R, BIOPSIES:  Benign lymph node fragments  5.   LYMPH NODE, STATION 11, BIOPSY:  One benign lymph node (0/1).  6.   LUNG, RIGHT UPPER LOBECTOMY:  Typical carcinoid/neuroendocrine tumor, grade 1  One benign lymph node (0/1).     LUNG CARCINOMA  SPECIMEN TYPE/PROCEDURE: Lobectomy  LATERALITY (Left or Right): Right  TUMOR FOCALITY: Single focus  TUMOR SITE: Upper lobe of lung  TUMOR SIZE : 1.6 cm  HISTOLOGIC TYPE: Typical carcinoid/neuroendocrine tumor, grade 1  VISCERAL PLEURAL INVASION: Not identified  DIRECT INVASION OF ADJACENT STRUCTURES: Not identified  MARGIN: All margins negative for invasive carcinoma  LYMPHVASCULAR INVASION: Not identified  CLOSEST MARGIN (Specify): Parenchymal  DISTANCE OF INVASIVE CARCINOMA FROM CLOSEST MARGIN: 0.8 cm  REGIONAL LYMPH NODE STATUS: All regional lymph nodes negative for tumor  NUMBER OF LYMPH NODES EXAMINED: At least 6  ROSY STATIONS EXAMINED: 4R, 7, 8, 2R, 11  TREATMENT EFFECT: No known presurgical therapy  AJCC PATHOLOGIC STAGE:  (COMPLETED BY PATHOLOGIST, BASED ONLY ON TISSUE FINDINGS, MORE EXTENSIVE DISEASE MAY NOT BE KNOWN TO THE PATHOLOGIST)  pT= 1b  pN= 0   pM= not applicable  AJCC PATHOLOGIC STAGE:  IA2  GJK   Electronically signed by Charles Pettit MD on 10/7/2024     CT Chest Without Contrast Diagnostic: 8/29/2024 (Personally reviewed)  Impression: 1.7 x 1.3 cm ovoid nodule in the right upper lobe. Please see PET scan report 8/16/2024. 2. Additional findings as given above.  Electronically Signed: Janes Mesa MD  8/29/2024 9:03 AM EDT               Bronchoscopy Pathology 8/30/2024  Clinical Information     Incidental lung nodule, greater than or equal to 8mm   Final Diagnosis   Lung, right upper lobe, TB  BX:  Well-differentiated neuroendocrine tumor/carcinoid tumor, see comment       Electronically signed by Milka Walton DO on 9/4/2024 at 0945      NM PET/CT SKULL BASE TO MID THIGH: 8/16/2024  (Personally reviewed and agree w/ Radiology assessment)  Findings:   HEAD AND NECK:   No abnormal FDG accumulation is seen within the head or neck. No adenopathy is seen.   CHEST: 1.6 cm noncalcified nodule in the central right upper lobe is hypermetabolic with SUV max 5.53 (image 115), and is suspicious for malignancy. No additional pulmonary nodules are identified. No pathologically enlarged or hypermetabolic lymph nodes   are evident. Mild scarring in the inferior lingular segment left upper lobe.   ABDOMEN AND PELVIS:   No abnormal radiopharmaceutical uptake is seen within the abdomen or pelvis. No soft tissue mass, adenopathy or ascites is seen. Hysterectomy. Mild uncomplicated colonic diverticulosis. Cholecystectomy. Noncontrast appearance of the liver, spleen,   pancreas, adrenals, kidneys is within normal limits. Urinary bladder and rectum are normal. Hysterectomy.   SKELETAL:  No abnormal FDG accumulation within the skeleton. No suspicious osteolytic or osteoblastic lesions identified.   IMPRESSION:  1. 1.6 cm right upper lobe nodule is hypermetabolic and suspicious for malignancy. Soft tissue sampling is suggested.   Electronically Signed: Adriana George MD 8/20/2024 12:06 PM EDT      CT CHEST W CONTRAST DIAGNOSTIC: 7/29/2024  (Personally reviewed and agree w/ Radiology assessment)  IMPRESSION:   1. 1.6 cm right upper lobe nodule. The lesion does not have overtly suspicious characteristics. Depending on patient risk factors, consider 3-month follow-up chest CT, PET/CT, or tissue diagnosis.  2. No acute process demonstrated.   Electronically Signed: José Miguel Hill  7/29/2024 3:20 PM EDT   Assessment / Plan      Assessment / Plan:  1. Lung nodule s/p Robotic right upper lobectomy 10/1/24   2. Carcinoid tumor of  right lung  - 59 y.o. female s/p robotic right upper lobectomy with mediastinal lymph node dissection 10/1/2024 per Dr. Rao to treat right upper lobe carcinoid tumor.    - PMH: Lifelong non-smoker, MELISA on CPAP, type 2 DM, RLS, HTN, GERD, fatty liver disease, hypothyroid, obesity, and RUL neuroendocrine tumor T1b N0 M0 stage I A2.   - Surgical pathology demonstrated tumor size 1.6 cm with histologic type typical carcinoid grade 1, clear margins and without lymph vascular invasion.  All regional lymph nodes negative for tumor.    - Uncomplicated surgical intervention, POD #2 DC home  - No readmissions or ER visits in the interim.    - Established with Medical Oncology, Dr. Andrews, 10/25/2024: No adjuvant chemotherapy indicated.  Active surveillance is planned with CT imaging in 3 months.  - Presents for scheduled 6-week postop visit:CXR notes improving atelectasis and volume loss, VATS sites healing well without complication, sutures removed without complication, and steady functional recovery  - Resume activities @ pre-operative levels gradually  - Walk daily  - Return to CT Surgery after first post op CT (scheduling per Oncology) for imaging review.         Follow Up:   Return in about 3 months (around 2/13/2025) for CT chest per Oncology scheduling.   Or sooner for any further concerns or worsening sign and symptoms. If unable to reach us in the office please dial 911 or go to the nearest emergency department.      STALIN Rodriguez  [facility] Cardiothoracic Surgery    Time Spent: I spent 25 minutes caring for Maria Dolores on this date of service. This time includes time spent by me in the following activities: preparing for the visit, reviewing tests, obtaining and/or reviewing a separately obtained history, performing a medically appropriate examination and/or evaluation, counseling and educating the patient/family/caregiver, documenting information in the medical record, independently interpreting results and  communicating that information with the patient/family/caregiver, and care coordination.

## 2024-11-13 ENCOUNTER — OFFICE VISIT (OUTPATIENT)
Dept: CARDIAC SURGERY | Facility: CLINIC | Age: 59
End: 2024-11-13
Payer: COMMERCIAL

## 2024-11-13 VITALS
DIASTOLIC BLOOD PRESSURE: 90 MMHG | BODY MASS INDEX: 38.65 KG/M2 | SYSTOLIC BLOOD PRESSURE: 138 MMHG | HEIGHT: 65 IN | OXYGEN SATURATION: 98 % | HEART RATE: 74 BPM | TEMPERATURE: 97.6 F | WEIGHT: 232 LBS

## 2024-11-13 DIAGNOSIS — R91.1 INCIDENTAL LUNG NODULE, GREATER THAN OR EQUAL TO 8MM: Primary | ICD-10-CM

## 2024-11-13 DIAGNOSIS — D3A.090 CARCINOID TUMOR OF RIGHT LUNG: ICD-10-CM

## 2024-11-13 PROCEDURE — 99024 POSTOP FOLLOW-UP VISIT: CPT | Performed by: NURSE PRACTITIONER

## 2024-11-15 ENCOUNTER — OFFICE VISIT (OUTPATIENT)
Dept: PULMONOLOGY | Facility: CLINIC | Age: 59
End: 2024-11-15
Payer: COMMERCIAL

## 2024-11-15 VITALS
OXYGEN SATURATION: 98 % | WEIGHT: 232.5 LBS | HEART RATE: 89 BPM | HEIGHT: 65 IN | SYSTOLIC BLOOD PRESSURE: 130 MMHG | DIASTOLIC BLOOD PRESSURE: 90 MMHG | TEMPERATURE: 98.6 F | BODY MASS INDEX: 38.74 KG/M2

## 2024-11-15 DIAGNOSIS — R91.1 INCIDENTAL LUNG NODULE, GREATER THAN OR EQUAL TO 8MM: Primary | ICD-10-CM

## 2024-11-15 DIAGNOSIS — D3A.090 CARCINOID TUMOR OF RIGHT LUNG: ICD-10-CM

## 2024-11-15 NOTE — PROGRESS NOTES
Subjective:     Chief Complaint:   Chief Complaint   Patient presents with    Lung Nodule    Follow-up       HPI:    Maria Dolores Sandoval is a 59 y.o. female here for follow-up of lung nodule/lung cancer    I saw her in initial consultation in August of this year.  She had a ER visit in July for chest pain that was felt to be GI in origin.  A CT scan of the chest while in the ER revealed a 1.6 cm well-circumscribed right upper lobe nodule.  A PET scan done after my visit revealed the nodule to be hypermetabolic.  There was no significant mediastinal uptake.  She underwent bronchoscopy with robotic biopsy revealing well-differentiated neuroendocrine tumor.  She underwent a right upper lobe lobectomy on 9/30/2024.  Pathology consistent with a T1b N0 M0 stage I A2 carcinoid.  She has seen Dr. Andrews.  No other treatments recommended and follow-up CAT scan has been planned and ordered by Dr. Andrews with follow-up there.    From her standpoint she is doing well pulmonary wise.  She notes no persistent cough, sputum production, or significant lifestyle limiting dyspnea on exertion.  Her postoperative pain is improving.  She is on no inhaled therapy or oxygen.    Current medications are:   Current Outpatient Medications:     buPROPion XL (WELLBUTRIN XL) 150 MG 24 hr tablet, Take 1 tablet by mouth Every Evening., Disp: , Rfl:     calcium carbonate (TUMS) 500 MG chewable tablet, Chew 1 tablet As Needed for Indigestion or Heartburn., Disp: , Rfl:     CREATINE PO, Take  by mouth Daily., Disp: , Rfl:     levothyroxine (SYNTHROID, LEVOTHROID) 100 MCG tablet, Take 1 tablet by mouth Daily., Disp: , Rfl:     lisinopril (PRINIVIL,ZESTRIL) 2.5 MG tablet, 1 tablet Every Night., Disp: , Rfl:     MAGNESIUM PO, Take  by mouth Daily., Disp: , Rfl:     OneTouch Ultra test strip, , Disp: , Rfl: .      The patient's relevant past medical, surgical, family and social history were reviewed and updated in Epic as appropriate.     ROS:    Review of  Systems  ROS as documented in patient questionnaire unless as noted otherwise    Objective:    Physical Exam  Vitals reviewed.   Constitutional:       Appearance: She is well-developed.   HENT:      Head: Normocephalic and atraumatic.      Mouth/Throat:      Mouth: Mucous membranes are moist.      Pharynx: Oropharynx is clear.   Neck:      Thyroid: No thyromegaly.   Cardiovascular:      Rate and Rhythm: Normal rate and regular rhythm.      Heart sounds: No murmur heard.     No friction rub. No gallop.   Pulmonary:      Effort: Pulmonary effort is normal. No respiratory distress.      Breath sounds: No wheezing or rales.   Musculoskeletal:      Cervical back: Neck supple.   Skin:     General: Skin is warm and dry.   Neurological:      Mental Status: She is alert and oriented to person, place, and time.   Psychiatric:         Behavior: Behavior normal.         Thought Content: Thought content normal.         Data:    CXR: Yesterday simply revealed postoperative volume loss on the right side.    PFT: No additional    Assessment:    Problem List Items Addressed This Visit          Pulmonary Problems    Lung nodule s/p Robotic right upper lobectomy 10/1/24 - Primary    Overview     Incidental 1.6 cm right upper lobe well-circumscribed nodule on chest CT 7/29/2024 also noted on chest x-ray that same day in ER.  Not present on chest x-ray dated 2017  Stage I A2 carcinoid at resection         Carcinoid tumor of right lung         Stage I A2 carcinoid with resection via right upper lobe lobectomy October 2024.  No adjuvant therapy planned.  Follow-up CAT scan ordered by Dr. Andrews and he will follow-up with her  No other pulmonary disease or unusual pulmonary symptoms at this point    Plan:    Follow-up with Dr. Andrews after her follow-up CAT scan is planned  I will see her on an as-needed basis in the pulmonary clinic at this point      Discussed in detail with the patient.      Signed by  Rajiv Rucker MD

## 2025-01-21 ENCOUNTER — HOSPITAL ENCOUNTER (OUTPATIENT)
Dept: CT IMAGING | Facility: HOSPITAL | Age: 60
Discharge: HOME | End: 2025-01-21
Admitting: INTERNAL MEDICINE
Payer: COMMERCIAL

## 2025-01-21 DIAGNOSIS — R91.1 INCIDENTAL LUNG NODULE, GREATER THAN OR EQUAL TO 8MM: ICD-10-CM

## 2025-01-21 PROCEDURE — 71260 CT THORAX DX C+: CPT

## 2025-01-21 PROCEDURE — 25510000001 IOPAMIDOL 61 % SOLUTION: Performed by: INTERNAL MEDICINE

## 2025-01-21 RX ORDER — IOPAMIDOL 612 MG/ML
100 INJECTION, SOLUTION INTRAVASCULAR
Status: COMPLETED | OUTPATIENT
Start: 2025-01-21 | End: 2025-01-21

## 2025-01-21 RX ADMIN — IOPAMIDOL 85 ML: 612 INJECTION, SOLUTION INTRAVENOUS at 08:59

## 2025-01-28 ENCOUNTER — OFFICE VISIT (OUTPATIENT)
Dept: ONCOLOGY | Facility: CLINIC | Age: 60
End: 2025-01-28
Payer: COMMERCIAL

## 2025-01-28 VITALS
DIASTOLIC BLOOD PRESSURE: 85 MMHG | SYSTOLIC BLOOD PRESSURE: 122 MMHG | OXYGEN SATURATION: 98 % | HEIGHT: 65 IN | RESPIRATION RATE: 20 BRPM | BODY MASS INDEX: 40.48 KG/M2 | WEIGHT: 243 LBS | TEMPERATURE: 97.4 F | HEART RATE: 82 BPM

## 2025-01-28 DIAGNOSIS — D3A.090 CARCINOID TUMOR OF RIGHT LUNG: Primary | ICD-10-CM

## 2025-01-28 PROCEDURE — 99214 OFFICE O/P EST MOD 30 MIN: CPT | Performed by: INTERNAL MEDICINE

## 2025-01-28 NOTE — PROGRESS NOTES
DATE OF VISIT: 1/28/2025    REASON FOR VISIT: Followup for lung cancer     PROBLEM LIST:  1.  Right upper lobe typical lung carcinoid, T1b N0 M0 stage I A2:  A.  Patient presented with growing nodule right lower lobe  B.  Diagnosed after bronchoscopy with biopsy done August 30, 2024 revealing well-differentiated regarding tumor  C.  Status post right upper lobe resection with lymph node sampling done by Dr. Rao October 1, 2024  D.  Final pathology revealed low-grade well-differentiated neuroendocrine tumor most consistent with typical carcinoid, 1.6 cm in size, clear margins and negative hilar as well as mediastinal nodes.  2.  Hypertension  3.  Hypothyroid    HISTORY OF PRESENT ILLNESS: The patient is a very pleasant 59 y.o. female  with past medical history significant for right lung carcinoid diagnosed August 2024.  Status post surgical resection by Dr. Rao October 2024. The  patient is here today for scheduled follow-up visit.    SUBJECTIVE: The patient is here today by herself.  All in all she is doing well.  Any fever chills or night sweats.  She is anxious about the scan results.    Past History:  Medical History: has a past medical history of Arthritis, Back pain, Cancer, Depression, Diabetes mellitus, Diarrhea, Diarrhea following gastrointestinal surgery, Disease of thyroid gland, Fatty liver (2005), Fatty liver, GERD (gastroesophageal reflux disease), Headache, Hiatal hernia, History of anemia, History of depression, History of migraine, Hypertension, IBS (irritable bowel syndrome), Insomnia, Murmur, Polycystic disease, ovaries, Presenile cataract, Scoliosis, Sleep apnea with use of continuous positive airway pressure (CPAP), and Wears glasses.   Surgical History: has a past surgical history that includes Colonoscopy (2017); Cholecystectomy (2014); Diagnostic laparoscopy (2007); Vagina surgery; [place] tooth extraction; d & c hysteroscopy endometrial ablation (N/A, 08/30/2017); d & c hysteroscopy  "myosure (N/A, 08/30/2017); Hernia repair (2014); total laparoscopic hysterectomy (N/A, 11/03/2017); Liver biopsy (2012); Umbilical hernia repair; Esophagogastroduodenoscopy; BRONCHOSCOPY WITH ION ROBOTIC ASSIST (N/A, 08/30/2024); Bronchoscopy (N/A, 10/1/2024); THORACOSCOPY WITH WEDGE RESECTION (Right, 10/1/2024); and Lymph node dissection (N/A, 10/1/2024).   Family History: family history includes Breast cancer (age of onset: 69) in her paternal aunt; Cirrhosis in her father; Colon cancer in her maternal uncle; Diabetes in her mother; Heart disease in her mother; Ovarian cancer (age of onset: 30) in her sister.   Social History: reports that she has never smoked. She has never been exposed to tobacco smoke. She has never used smokeless tobacco. She reports that she does not drink alcohol and does not use drugs.    (Not in a hospital admission)     Allergies: Codeine, Adhesive tape, and Chlorhexidine     Review of Systems   Constitutional: Negative.    Respiratory: Negative.     Psychiatric/Behavioral:  The patient is nervous/anxious.        PHYSICAL EXAMINATION:   /85 Comment: LUE  Pulse 82   Temp 97.4 °F (36.3 °C) (Temporal)   Resp 20   Ht 165.1 cm (65\")   Wt 110 kg (243 lb)   LMP 10/26/2017   SpO2 98% Comment: RA  BMI 40.44 kg/m²    Pain Score    01/28/25 0817   PainSc: 0-No pain        ECOG score: 0            ECOG Performance Status: 0 - Asymptomatic      General Appearance:      alert, cooperative, no apparent distress and appears stated age   Lungs:   Clear to auscultation bilaterally; respirations regular, even, and unlabored bilaterally   Heart:  Regular rate and rhythm, no murmurs appreciated   Abdomen:   Soft, non-tender, non-distended, and no organomegaly                 No visits with results within 2 Week(s) from this visit.   Latest known visit with results is:   Admission on 09/30/2024, Discharged on 10/03/2024   Component Date Value Ref Range Status    Product Code 10/01/2024 L9352A44   " Final    Unit Number 10/01/2024 [id number]   Final    UNIT  ABO 10/01/2024 A   Final    UNIT  RH 10/01/2024 POS   Final    Crossmatch Interpretation 10/01/2024 Compatible   Final    Dispense Status 10/01/2024 RE   Final    Blood Expiration Date 10/01/2024 821882861985   Final    Blood Type Barcode 10/01/2024 6200   Final    Product Code 10/01/2024 X6345V00   Final    Unit Number 10/01/2024 [id number]   Final    UNIT  ABO 10/01/2024 A   Final    UNIT  RH 10/01/2024 POS   Final    Crossmatch Interpretation 10/01/2024 Compatible   Final    Dispense Status 10/01/2024 RE   Final    Blood Expiration Date 10/01/2024 861590170300   Final    Blood Type Barcode 10/01/2024 6200   Final    Glucose 09/30/2024 77  70 - 130 mg/dL Final    Product Code 10/03/2024 [id number]   Final    Unit Number 10/03/2024 [id number]   Final    UNIT  ABO 10/03/2024 A   Final    UNIT  RH 10/03/2024 POS   Final    Crossmatch Interpretation 10/03/2024 Compatible   Final    Dispense Status 10/03/2024 RE   Final    Blood Expiration Date 10/03/2024 [id number]   Final    Blood Type Barcode 10/03/2024 6200   Final    ABO Type 10/01/2024 A   Final    RH type 10/01/2024 Positive   Final    Antibody Screen 10/01/2024 Negative   Final    T&S Expiration Date 10/01/2024 10/4/2024 11:59:59 PM   Final    WBC 10/01/2024 5.94  3.40 - 10.80 10*3/mm3 Final    RBC 10/01/2024 4.79  3.77 - 5.28 10*6/mm3 Final    Hemoglobin 10/01/2024 13.5  12.0 - 15.9 g/dL Final    Hematocrit 10/01/2024 41.7  34.0 - 46.6 % Final    MCV 10/01/2024 87.1  79.0 - 97.0 fL Final    MCH 10/01/2024 28.2  26.6 - 33.0 pg Final    MCHC 10/01/2024 32.4  31.5 - 35.7 g/dL Final    RDW 10/01/2024 14.1  12.3 - 15.4 % Final    RDW-SD 10/01/2024 45.1  37.0 - 54.0 fl Final    MPV 10/01/2024 9.7  6.0 - 12.0 fL Final    Platelets 10/01/2024 245  140 - 450 10*3/mm3 Final    Glucose 10/01/2024 84  65 - 99 mg/dL Final    BUN 10/01/2024 15  6 - 20 mg/dL Final    Creatinine 10/01/2024 0.90  0.57 -  1.00 mg/dL Final    Sodium 10/01/2024 141  136 - 145 mmol/L Final    Potassium 10/01/2024 4.2  3.5 - 5.2 mmol/L Final    Slight hemolysis detected by analyzer. Result may be falsely elevated.    Chloride 10/01/2024 105  98 - 107 mmol/L Final    CO2 10/01/2024 25.0  22.0 - 29.0 mmol/L Final    Calcium 10/01/2024 8.9  8.6 - 10.5 mg/dL Final    BUN/Creatinine Ratio 10/01/2024 16.7  7.0 - 25.0 Final    Anion Gap 10/01/2024 11.0  5.0 - 15.0 mmol/L Final    eGFR 10/01/2024 73.8  >60.0 mL/min/1.73 Final    Case Report 10/01/2024    Final                    Value:Surgical Pathology Report                         Case: [id number]                                  Authorizing Provider:  Christian Rao MD         Collected:           10/01/2024 05:41 PM          Ordering Location:     [facility]   Received:            10/02/2024 06:41 AM                                 OR                                                                           Pathologist:           Charles Pettit MD                                                            Intraop:               Charles Pettit MD                                                            Specimens:   1) - Lymph Node, STATION 4R FOR PERMANENT                                                           2) - Lymph Node, STATION 7 FOR PERMANENT                                                            3) - Lymph Node, STATION 8 FOR PERMANENT                                                            4) - Lymph Node, STATION 2R FOR PERMANENT                                                                                     5) - Lymph Node, Station 11                                                                         6) - Lung, Right Upper Lobe, RIGHT UPPER LOBE FOR FROZEN WITH MARGINS                      Clinical Information 10/01/2024    Final                    Value:Incidental lung nodule, greater than or equal to 8mm    Final Diagnosis 10/01/2024    Final                     Value:LYMPH NODE, STATION 4R, BIOPSY:                          One benign lymph node (0/1).                          2.   LYMPH NODE, STATION 7, BIOPSY:                          One benign lymph node (0/1).                          3.   LYMPH NODES, STATION 8, BIOPSIES:                          Benign lymph node fragments                          4.   LYMPH NODES, STATION 2R, BIOPSIES:                          Benign lymph node fragments                          5.   LYMPH NODE, STATION 11, BIOPSY:                          One benign lymph node (0/1).                          6.   LUNG, RIGHT UPPER LOBECTOMY:                          Typical carcinoid/neuroendocrine tumor, grade 1                          One benign lymph node (0/1).                                                    LUNG CARCINOMA                          SPECIMEN TYPE/PROCEDURE: Lobectomy                          LATERALITY (Left or Right): Right                          TUMOR FOCALITY: Single focus                          TUMOR SITE: Upper lobe of lung                          TUMOR SIZE : 1.6 cm                          HISTOLOGIC TYPE: Typical carcinoid/neuroendocrine tumor, grade 1                          VISCERAL PLEURAL INVASION: Not identified                          DIRECT INVASION OF ADJACENT STRUCTURES: Not identified                          MARGIN: All margins negative for invasive carcinoma                          LYMPHVASCULAR INVASION: Not identified                          CLOSEST MARGIN (Specify): Parenchymal                          DISTANCE OF INVASIVE CARCINOMA FROM CLOSEST MARGIN: 0.8 cm                          REGIONAL LYMPH NODE STATUS: All regional lymph nodes negative for tumor                          NUMBER OF LYMPH NODES EXAMINED: At least 6                          ROSY STATIONS EXAMINED: 4R, 7, 8, 2R, 11                          TREATMENT EFFECT: No known presurgical therapy                    "       AJCC PATHOLOGIC STAGE:  (COMPLETED BY PATHOLOGIST, BASED ONLY ON TISSUE                           FINDINGS, MORE EXTENSIVE DISEASE MAY NOT BE KNOWN TO THE PATHOLOGIST)                          pT= 1b                          pN= 0                           pM= not applicable                          AJCC PATHOLOGIC STAGE:  IA2                          GJK    Intraoperative Consultation 10/01/2024    Final                    Value:Frozen section: Verbal report given to Dr. Rao via speakerphone on                           10/1/2024 at 19:15 EDT.                                                    FROZEN SECTION DIAGNOSIS: Bronchovascular margin negative for malignancy.                            (TISHA)                          Stains used for Immediate Evaluation are acceptable.                                                                                                            Gross Description 10/01/2024    Final                    Value:1. Lymph Node.                          Received in formalin labeled \"station 4R for permanent\" is a tan-gray                           anthracotic possible lymph node measuring 1.0 x 0.4 x 0.3 cm.  The                           specimen is entirely as is in 1A.                                                    2. Lymph Node.                          Received in formalin labeled \"station 7 for permanent\" is a tan-gray                           possible lymph node measuring 3.1 x 1.4 x 0.7 cm.  The specimen is                           submitted entirely in 2A.                          3. Lymph Node.                          Received in formalin labeled \"station 8 for permanent\" is an aggregate of                           tan-gray possible anthracotic lymph nodes measuring 2.7 x 2.3 x 1.0 cm.                            The specimen is submitted entirely in 3A.                                                    4. Lymph Node.                          Received in " "formalin labeled \"station 2R for permanent\" is an aggregate of                           tan-gray fragmented lymph nodes measuring 3.4 x 3.6 x 1.8 cm.  Within the                           aggregate is a separate portion of tan-gray to tan-yellow lobulated soft                           tissue fragment containing possible matted lymph nodes measuring 3.3 x 3.2                           x 1.7 cm.  The specimen is submitted entirely as follows:                          4A loose aggregate of possible lymph nodes                          4B-4D matted possible lymph nodes, serially sectioned.                                                    5. Lymph Node.                          Received in formalin labeled \"station 11\" is a tan-gray possible lymph                           node measuring 1.8 x 1.1 x 0.5 cm.  Specimen is submitted entirely in 5A.                                                    6. Lung, Right Upper Lobe.                          Received fresh for frozen section diagnosis and labeled \"right upper lobe                           for frozen with margin\" is a 143.1 g lung lobectomy measuring 15.1 x 8.9 x                           3.8 cm.  The pleural surface is tan-purple and smooth.  The specimen                           contains a linear parenchymal margin measuring 12.1 cm.  The staple line                           is removed and the underlying tissue is inked black.  The specimen is                           serially section from superior to inferior to reveal a tan-white                           well-defined firm lesion measuring 1.6 x 1.2 x 1.1 cm, located 0.4 cm to                           the pleural surface, 0.8 cm to the parenchymal margin, and 2.2 cm to the                           closest bronchial and vascular margins.  The uninvolved lung parenchyma is                           red-brown and spongy.  1 possible lymph node is grossly identified                           measuring 0.7 x " 0.4 x 0.4 cm.  Representative sections of the specimen are                           submitted as follows:                          6A frozen section tissue, resubmitted, bronchial margins, en face                          6B remainder of vascular margins, en face                          6C-6F lesion submitted entirely from superior to inferior (6E-6F slice,                           bisected)                          6G1 possible lymph node, bisected. [place]                              Special Stains 10/01/2024    Final                    Value:Stains performed with adequate controls.  Erick performed on parts 1, 2,                           3, 4, 5 and, 6 support the diagnosis.                          AE1/AE3, CK8, and synaptophysin performed on part 4 support the diagnosis.                          5.  Tumor positive for CD56, synaptophysin, and chromogranin.  Ki-67                           proliferation rate less than 3%.  Tumor negative for high molecular weight                           keratin.    Microscopic Description 10/01/2024    Final                    Value:The slides are reviewed and demonstrate histopathologic features                           supporting the above rendered diagnosis.      Glucose 10/02/2024 190 (H)  65 - 99 mg/dL Final    BUN 10/02/2024 13  6 - 20 mg/dL Final    Creatinine 10/02/2024 0.88  0.57 - 1.00 mg/dL Final    Sodium 10/02/2024 138  136 - 145 mmol/L Final    Potassium 10/02/2024 3.8  3.5 - 5.2 mmol/L Final    Chloride 10/02/2024 104  98 - 107 mmol/L Final    CO2 10/02/2024 22.0  22.0 - 29.0 mmol/L Final    Calcium 10/02/2024 8.2 (L)  8.6 - 10.5 mg/dL Final    BUN/Creatinine Ratio 10/02/2024 14.8  7.0 - 25.0 Final    Anion Gap 10/02/2024 12.0  5.0 - 15.0 mmol/L Final    eGFR 10/02/2024 75.8  >60.0 mL/min/1.73 Final    WBC 10/02/2024 12.46 (H)  3.40 - 10.80 10*3/mm3 Final    RBC 10/02/2024 4.72  3.77 - 5.28 10*6/mm3 Final    Hemoglobin 10/02/2024 13.3  12.0 - 15.9 g/dL Final     Hematocrit 10/02/2024 40.1  34.0 - 46.6 % Final    MCV 10/02/2024 85.0  79.0 - 97.0 fL Final    MCH 10/02/2024 28.2  26.6 - 33.0 pg Final    MCHC 10/02/2024 33.2  31.5 - 35.7 g/dL Final    RDW 10/02/2024 13.7  12.3 - 15.4 % Final    RDW-SD 10/02/2024 43.1  37.0 - 54.0 fl Final    MPV 10/02/2024 9.2  6.0 - 12.0 fL Final    Platelets 10/02/2024 251  140 - 450 10*3/mm3 Final    Neutrophil % 10/02/2024 85.4 (H)  42.7 - 76.0 % Final    Lymphocyte % 10/02/2024 6.4 (L)  19.6 - 45.3 % Final    Monocyte % 10/02/2024 7.5  5.0 - 12.0 % Final    Eosinophil % 10/02/2024 0.1 (L)  0.3 - 6.2 % Final    Basophil % 10/02/2024 0.2  0.0 - 1.5 % Final    Immature Grans % 10/02/2024 0.4  0.0 - 0.5 % Final    Neutrophils, Absolute 10/02/2024 10.64 (H)  1.70 - 7.00 10*3/mm3 Final    Lymphocytes, Absolute 10/02/2024 0.80  0.70 - 3.10 10*3/mm3 Final    Monocytes, Absolute 10/02/2024 0.93 (H)  0.10 - 0.90 10*3/mm3 Final    Eosinophils, Absolute 10/02/2024 0.01  0.00 - 0.40 10*3/mm3 Final    Basophils, Absolute 10/02/2024 0.03  0.00 - 0.20 10*3/mm3 Final    Immature Grans, Absolute 10/02/2024 0.05  0.00 - 0.05 10*3/mm3 Final    nRBC 10/02/2024 0.0  0.0 - 0.2 /100 WBC Final    Glucose 10/02/2024 130  70 - 130 mg/dL Final    Glucose 10/02/2024 139 (H)  70 - 130 mg/dL Final    WBC 10/03/2024 10.84 (H)  3.40 - 10.80 10*3/mm3 Final    RBC 10/03/2024 4.69  3.77 - 5.28 10*6/mm3 Final    Hemoglobin 10/03/2024 13.2  12.0 - 15.9 g/dL Final    Hematocrit 10/03/2024 40.6  34.0 - 46.6 % Final    MCV 10/03/2024 86.6  79.0 - 97.0 fL Final    MCH 10/03/2024 28.1  26.6 - 33.0 pg Final    MCHC 10/03/2024 32.5  31.5 - 35.7 g/dL Final    RDW 10/03/2024 14.5  12.3 - 15.4 % Final    RDW-SD 10/03/2024 45.6  37.0 - 54.0 fl Final    MPV 10/03/2024 9.7  6.0 - 12.0 fL Final    Platelets 10/03/2024 258  140 - 450 10*3/mm3 Final    Glucose 10/03/2024 104 (H)  65 - 99 mg/dL Final    BUN 10/03/2024 12  6 - 20 mg/dL Final    Creatinine 10/03/2024 0.81  0.57 - 1.00 mg/dL  Final    Sodium 10/03/2024 143  136 - 145 mmol/L Final    Potassium 10/03/2024 3.9  3.5 - 5.2 mmol/L Final    Slight hemolysis detected by analyzer. Result may be falsely elevated.    Chloride 10/03/2024 111 (H)  98 - 107 mmol/L Final    CO2 10/03/2024 23.0  22.0 - 29.0 mmol/L Final    Calcium 10/03/2024 8.1 (L)  8.6 - 10.5 mg/dL Final    BUN/Creatinine Ratio 10/03/2024 14.8  7.0 - 25.0 Final    Anion Gap 10/03/2024 9.0  5.0 - 15.0 mmol/L Final    eGFR 10/03/2024 83.7  >60.0 mL/min/1.73 Final    Glucose 10/03/2024 91  70 - 130 mg/dL Final    Glucose 10/03/2024 141 (H)  70 - 130 mg/dL Final        CT Chest With Contrast Diagnostic    Result Date: 1/23/2025  Narrative: CT CHEST W CONTRAST DIAGNOSTIC Date of Exam: 1/21/2025 8:36 AM EST Indication: f/u scans lung carcinoid. Comparison: 8/16/2024 PET/CT scan. 8/20/2024 unenhanced chest CT scan. 7/29/2024 contrast-enhanced chest CT scan Technique: Axial CT images were obtained of the chest after the uneventful intravenous administration of 85 mL Isovue-300.  Reconstructed coronal and sagittal images were also obtained. Automated exposure control and iterative construction methods were used. Findings: Previous CT scan showed an ovoid 1.6 cm right upper lobe noncalcified nodule which was moderately hypermetabolic on PET scan. Subsequent history of robotic right upper lobectomy. Today's study shows expected minimal scarring from right upper lobectomy, and no evidence of new right lung nodule or other new right lung disease. There is no pleural effusion. There is minimal dependent atelectasis in the posterior left lower lobe, and  thin linear scarring in the lingula. No active left lung disease is appreciated. The remaining airways appear normally patent. Images of the mediastinum show no apparent mass or adenopathy of the mediastinum, pulmonary yoshi, axillary or lower cervical regions. Thoracic aorta and pulmonary arteries appear grossly normal. There is residual, focally  dense, symmetric subareolar breast tissue, stable in appearance back to 7/29/2024. Included images of the upper abdomen show fatty liver change and previous cholecystectomy. Except for small granulomatous calcification, no hepatic lesions are seen. Spleen is not enlarged. No significant abnormalities are seen of the included portions of the pancreas, adrenal glands, or upper renal poles. No upper abdominal ascites or adenopathy is appreciated. Bony structures appear to be intact.     Impression: Impression: Expected postoperative changes of previous right upper lobectomy. No evidence malignancy/metastasis or other new chest disease. Electronically Signed: Cal Stone MD  1/23/2025 12:29 PM EST  Workstation ID: [id number]       ASSESSMENT: The patient is a very pleasant 59 y.o. female  with right lung carcinoid      PLAN:    1.  Right upper lobe lung carcinoid T1b N0 M0 stage IA2:  A.  I did go over the scan results with the patient from January 23, 2025 and reassured no evidence of relapsed disease.    B.  I will continue watchful waiting.  I will switch her to every 6-month visit at this point.    2.  Hypothyroid:  A patient will continue Synthroid daily.    3.  Hypertension:  A.  She will continue lisinopril daily.    FOLLOW UP: 6 months with repeating CT scan.    Clemente Andrews MD  1/28/2025

## 2025-02-11 NOTE — PROGRESS NOTES
[facility] Cardiothoracic Surgery Office Follow Up Note     Date of Encounter: 2025     Name: Maria Dolores Sandoval  : 1965     Referred By: No ref. provider found  PCP: Christiano Lamb DO    Chief Complaint:    Chief Complaint   Patient presents with    Lung Cancer     3 month follow up with CT chest ordered by oncology        Subjective      History of Present Illness:    Maria Dolores Sandoval is a 59 y.o. female  s/p robotic right upper lobectomy with mediastinal lymph node dissection 10/1/2024 per Dr. Rao to treat right upper lobe carcinoid tumor.  PMH: Lifelong non-smoker, MELISA on CPAP, type 2 DM, RLS, HTN, GERD, fatty liver disease, hypothyroid, obesity, and RUL neuroendocrine tumor T1b N0 M0 stage I A2.  Last seen in CT surgery clinic 2024 recovering well from surgery.  Patient established with Medical Oncology, Dr. Andrews, 10/25/2024: No adjuvant chemotherapy indicated.  Active surveillance initiated with CT imaging in 3 months.  Patient followed up with Dr. Andrews on 2025.  This study was reviewed by Oncology and noted to be satisfactory.  She returns to CT surgery for the first postop CT imaging review with plans to continue surveillance with Oncology afterwards.      Review of Systems:  Review of Systems   Constitutional: Negative for chills, decreased appetite, diaphoresis, fever, malaise/fatigue, night sweats, weight gain and weight loss.   HENT:  Negative for hoarse voice.    Eyes:  Negative for blurred vision, double vision and visual disturbance.   Cardiovascular:  Negative for chest pain, claudication, dyspnea on exertion, irregular heartbeat, leg swelling, near-syncope, orthopnea, palpitations, paroxysmal nocturnal dyspnea and syncope.   Respiratory:  Negative for cough, hemoptysis, shortness of breath, sputum production and wheezing.    Hematologic/Lymphatic: Negative for adenopathy and bleeding problem. Does not bruise/bleed easily.   Skin:  Negative for color  change, nail changes, poor wound healing and rash.   Musculoskeletal:  Negative for back pain, falls and muscle cramps.   Gastrointestinal:  Negative for abdominal pain, dysphagia and heartburn.   Genitourinary:  Negative for flank pain.   Neurological:  Positive for numbness (at area where nerve block was done). Negative for brief paralysis, disturbances in coordination, dizziness, focal weakness, headaches, light-headedness, loss of balance, paresthesias, sensory change, vertigo and weakness.   Psychiatric/Behavioral:  Negative for depression and suicidal ideas.    Allergic/Immunologic: Negative for persistent infections.       I have reviewed the following portions of the patient's history: problem list, current medications, allergies, past surgical history, past medical history, past social history, past family history, and ROS and confirm it's accurate.    Allergies:  Allergies   Allergen Reactions    Codeine GI Intolerance     vomiting    Adhesive Tape Unknown - Low Severity     Surgical tape and bandaids adhesives   - long exposure     Chlorhexidine Unknown - Low Severity     Possible-  had reaction to bandaid after surgery dosent know if prep caused issues also or not        Medications:      Current Outpatient Medications:     buPROPion XL (WELLBUTRIN XL) 150 MG 24 hr tablet, Take 1 tablet by mouth Every Evening., Disp: , Rfl:     calcium carbonate (TUMS) 500 MG chewable tablet, Chew 1 tablet As Needed for Indigestion or Heartburn., Disp: , Rfl:     CREATINE PO, Take  by mouth Daily., Disp: , Rfl:     levothyroxine (SYNTHROID, LEVOTHROID) 100 MCG tablet, Take 112 mcg by mouth Daily., Disp: , Rfl:     lisinopril (PRINIVIL,ZESTRIL) 2.5 MG tablet, 1 tablet Every Night., Disp: , Rfl:     MAGNESIUM PO, Take  by mouth Daily., Disp: , Rfl:     OneTouch Ultra test strip, , Disp: , Rfl:     History:   Past Medical History:   Diagnosis Date    Arthritis     Back pain     Cancer     lung    Depression     Diabetes  mellitus     no meds currently; checks blood sugars at home occasioannly    Diarrhea     Diarrhea following gastrointestinal surgery     since cholecystectomy     Disease of thyroid gland     hypothyroidism     Fatty liver 2005    Dr. [facility]    Fatty liver     GERD (gastroesophageal reflux disease)     Headache     Hiatal hernia     still present    History of anemia     due to heavy periods     History of depression     History of migraine     early adulthood after a concussion-none recently     Hypertension     IBS (irritable bowel syndrome)     possible    Insomnia     Murmur     dx cardiologist heard    Polycystic disease, ovaries     Presenile cataract     not ripe yet    Scoliosis     Sleep apnea with use of continuous positive airway pressure (CPAP)     auto range of 6-16    Wears glasses        Past Surgical History:   Procedure Laterality Date    BRONCHOSCOPY N/A 10/1/2024    Procedure: BRONCHOSCOPY;  Surgeon: Christian Rao MD;  Location:  [facility];  Service: Robotics - DaVinci;  Laterality: N/A;    BRONCHOSCOPY WITH ION ROBOTIC ASSIST N/A 08/30/2024    Procedure: BRONCHOSCOPY NAVIGATION WITH ENDOBRONCHIAL ULTRASOUND AND ION ROBOT;  Surgeon: Christian Condon MD;  Location:  [facility];  Service: Robotics - Pulmonary;  Laterality: N/A;    CHOLECYSTECTOMY  2014    COLONOSCOPY  2017    polyps remoed     D & C HYSTEROSCOPY ENDOMETRIAL ABLATION N/A 08/30/2017    Procedure: HYSTEROSCOPY DILATATION AND CURETTAGE, ENDOMETRIAL ABLATION, POSSIBLE REMOVAL OF LEIOMYOMATA;  Surgeon: Veronica Riley MD;  Location:  [facility];  Service:     D & C HYSTEROSCOPY MYOSURE N/A 08/30/2017    Procedure: DILATATION AND CURETTAGE HYSTEROSCOPY WITH MYOSURE;  Surgeon: Veronica Riley MD;  Location:  [facility];  Service:     DIAGNOSTIC LAPAROSCOPY  2007    removed uterine septum     ENDOSCOPY      HERNIA REPAIR  2014    incisional hernia - possible mesh in place    LIVER BIOPSY  2012     "LYMPH NODE DISSECTION N/A 10/1/2024    Procedure: MEDIASTINAL LYMPH NODE DISSECTION WITH DAVINCI ROBOT;  Surgeon: Christian Rao MD;  Location:  [facility];  Service: Robotics - DaVinci;  Laterality: N/A;    THORACOSCOPY WITH WEDGE RESECTION Right 10/1/2024    Procedure: THORASCOPIC VIDEO ASSISTED RIGHT UPPER LOBECTOMY, INTERCOSTAL NERVE BLOCKS;  Surgeon: Christian Rao MD;  Location:  [facility];  Service: Robotics - DaVinci;  Laterality: Right;    TOTAL LAPAROSCOPIC HYSTERECTOMY N/A 11/03/2017    Procedure: TOTAL LAPAROSCOPIC HYSTERECTOMY, BILATERAL SALPINGO OOPHORECTOMY WITH DAVINCI ROBOT;  Surgeon: Ni Faulkner MD;  Location:  [facility];  Service:     UMBILICAL HERNIA REPAIR      mesh in place    VAGINA SURGERY      removal of polyps    WISDOM TOOTH EXTRACTION         Social History     Socioeconomic History    Marital status:     Number of children: 1   Tobacco Use    Smoking status: Never     Passive exposure: Never    Smokeless tobacco: Never   Vaping Use    Vaping status: Never Used   Substance and Sexual Activity    Alcohol use: No    Drug use: No    Sexual activity: Defer        Family History   Problem Relation Age of Onset    Diabetes Mother     Heart disease Mother     Cirrhosis Father     Ovarian cancer Sister 30        half sister    Colon cancer Maternal Uncle     Breast cancer Paternal Aunt 69       Objective   Physical Exam:  Vitals:    02/12/25 0813   BP: 124/86   BP Location: Left arm   Patient Position: Sitting   Pulse: 89   Temp: 97.8 °F (36.6 °C)   SpO2: 97%   Weight: 110 kg (243 lb 3.2 oz)   Height: 165.1 cm (65\")      Body mass index is 40.47 kg/m².    Physical Exam  Vitals reviewed.   Constitutional:       General: She is not in acute distress.     Appearance: She is not toxic-appearing.   HENT:      Head: Normocephalic and atraumatic.   Eyes:      General: Lids are normal.      Conjunctiva/sclera: Conjunctivae normal.      Pupils: Pupils are equal, round, and reactive to light. "   Neck:      Vascular: No carotid bruit.   Cardiovascular:      Rate and Rhythm: Normal rate and regular rhythm.      Heart sounds: S1 normal and S2 normal. No murmur heard.  Pulmonary:      Effort: Pulmonary effort is normal. No respiratory distress.      Breath sounds: No decreased breath sounds, wheezing, rhonchi or rales.   Musculoskeletal:         General: Normal range of motion.      Cervical back: Normal range of motion and neck supple.      Right lower leg: No edema.      Left lower leg: No edema.   Lymphadenopathy:      Cervical: No cervical adenopathy.      Upper Body:      Right upper body: No supraclavicular adenopathy.      Left upper body: No supraclavicular adenopathy.   Skin:     General: Skin is warm and dry.      Capillary Refill: Capillary refill takes less than 2 seconds.   Neurological:      General: No focal deficit present.      Mental Status: She is alert and oriented to person, place, and time.   Psychiatric:         Attention and Perception: Attention normal.         Mood and Affect: Mood normal.         Speech: Speech normal.         Behavior: Behavior normal. Behavior is cooperative.         Imaging/Labs:  CT Chest With Contrast Diagnostic: 1/23/2025 (Personally reviewed and agree w/ Radiology assessment)  Impression: Expected postoperative changes of previous right upper lobectomy. No evidence malignancy/metastasis or other new chest disease.   Electronically Signed: Cal Stone MD  1/23/2025  PM        Final Diagnosis   LYMPH NODE, STATION 4R, BIOPSY:  One benign lymph node (0/1).  2.   LYMPH NODE, STATION 7, BIOPSY:  One benign lymph node (0/1).  3.   LYMPH NODES, STATION 8, BIOPSIES:  Benign lymph node fragments  4.   LYMPH NODES, STATION 2R, BIOPSIES:  Benign lymph node fragments  5.   LYMPH NODE, STATION 11, BIOPSY:  One benign lymph node (0/1).  6.   LUNG, RIGHT UPPER LOBECTOMY:  Typical carcinoid/neuroendocrine tumor, grade 1  One benign lymph node (0/1).     LUNG  CARCINOMA  SPECIMEN TYPE/PROCEDURE: Lobectomy  LATERALITY (Left or Right): Right  TUMOR FOCALITY: Single focus  TUMOR SITE: Upper lobe of lung  TUMOR SIZE : 1.6 cm  HISTOLOGIC TYPE: Typical carcinoid/neuroendocrine tumor, grade 1  VISCERAL PLEURAL INVASION: Not identified  DIRECT INVASION OF ADJACENT STRUCTURES: Not identified  MARGIN: All margins negative for invasive carcinoma  LYMPHVASCULAR INVASION: Not identified  CLOSEST MARGIN (Specify): Parenchymal  DISTANCE OF INVASIVE CARCINOMA FROM CLOSEST MARGIN: 0.8 cm  REGIONAL LYMPH NODE STATUS: All regional lymph nodes negative for tumor  NUMBER OF LYMPH NODES EXAMINED: At least 6  ROSY STATIONS EXAMINED: 4R, 7, 8, 2R, 11  TREATMENT EFFECT: No known presurgical therapy  AJCC PATHOLOGIC STAGE:  (COMPLETED BY PATHOLOGIST, BASED ONLY ON TISSUE FINDINGS, MORE EXTENSIVE DISEASE MAY NOT BE KNOWN TO THE PATHOLOGIST)  pT= 1b  pN= 0   pM= not applicable  AJCC PATHOLOGIC STAGE:  IA2  GJK   Electronically signed by Charles Pettit MD on 10/7/2024      CT Chest Without Contrast Diagnostic: 8/29/2024   Impression: 1.7 x 1.3 cm ovoid nodule in the right upper lobe. Please see PET scan report 8/16/2024. 2. Additional findings as given above.  Electronically Signed: Janes Mesa MD  8/29/2024 9:03 AM EDT       NM PET/CT SKULL BASE TO MID THIGH: 8/16/2024  (Personally reviewed and agree w/ Radiology assessment)  Findings:   HEAD AND NECK:   No abnormal FDG accumulation is seen within the head or neck. No adenopathy is seen.   CHEST: 1.6 cm noncalcified nodule in the central right upper lobe is hypermetabolic with SUV max 5.53 (image 115), and is suspicious for malignancy. No additional pulmonary nodules are identified. No pathologically enlarged or hypermetabolic lymph nodes   are evident. Mild scarring in the inferior lingular segment left upper lobe.   ABDOMEN AND PELVIS:   No abnormal radiopharmaceutical uptake is seen within the abdomen or pelvis. No soft tissue mass, adenopathy  or ascites is seen. Hysterectomy. Mild uncomplicated colonic diverticulosis. Cholecystectomy. Noncontrast appearance of the liver, spleen,   pancreas, adrenals, kidneys is within normal limits. Urinary bladder and rectum are normal. Hysterectomy.   SKELETAL:  No abnormal FDG accumulation within the skeleton. No suspicious osteolytic or osteoblastic lesions identified.   IMPRESSION:  1. 1.6 cm right upper lobe nodule is hypermetabolic and suspicious for malignancy. Soft tissue sampling is suggested.   Electronically Signed: Adriana George MD 8/20/2024 12:06 PM EDT      CT CHEST W CONTRAST DIAGNOSTIC: 7/29/2024  (Personally reviewed and agree w/ Radiology assessment)  IMPRESSION:   1. 1.6 cm right upper lobe nodule. The lesion does not have overtly suspicious characteristics. Depending on patient risk factors, consider 3-month follow-up chest CT, PET/CT, or tissue diagnosis.  2. No acute process demonstrated.   Electronically Signed: José Miguel Hill  7/29/2024 3:20 PM EDT   Assessment / Plan      Assessment / Plan:  1. Carcinoid tumor of right lung   - 59 y.o. female  s/p robotic right upper lobectomy with mediastinal lymph node dissection 10/1/2024 per Dr. Rao to treat right upper lobe carcinoid tumor.    - PMH: Lifelong non-smoker, MELISA on CPAP, type 2 DM, RLS, HTN, GERD, fatty liver disease, hypothyroid, obesity, and RUL neuroendocrine tumor T1b N0 M0 stage I A2.    - Last seen in CT surgery clinic 11/13/2024 recovering well from surgery.    - Established with Medical Oncology, Dr. Andrews, 10/25/2024: No adjuvant chemotherapy indicated.    - Active surveillance initiated with CT imaging in 3 months.  - Seen in Oncology clinic 1/28/2025 w/ review of satisfactory post op CT chest.  No significant residual atelectasis, pleural effusion, or new pulmonary nodule/mediastinal adenopathy.  - Reviewed imaging and agree  - Patient may continue active surveillance w/ Oncology and return to CT Surgery PRN.        Follow Up:    Return PRN per Oncology request.   Or sooner for any further concerns or worsening sign and symptoms. If unable to reach us in the office please dial 911 or go to the nearest emergency department.      STALIN Rodriguez  [facility] Cardiothoracic Surgery    Time Spent: I spent 25 minutes caring for Maria Dolores on this date of service. This time includes time spent by me in the following activities: preparing for the visit, reviewing tests, obtaining and/or reviewing a separately obtained history, performing a medically appropriate examination and/or evaluation, counseling and educating the patient/family/caregiver, documenting information in the medical record, independently interpreting results and communicating that information with the patient/family/caregiver, and care coordination.

## 2025-02-12 ENCOUNTER — OFFICE VISIT (OUTPATIENT)
Dept: CARDIAC SURGERY | Facility: CLINIC | Age: 60
End: 2025-02-12
Payer: COMMERCIAL

## 2025-02-12 VITALS
WEIGHT: 243.2 LBS | SYSTOLIC BLOOD PRESSURE: 124 MMHG | OXYGEN SATURATION: 97 % | HEART RATE: 89 BPM | HEIGHT: 65 IN | TEMPERATURE: 97.8 F | DIASTOLIC BLOOD PRESSURE: 86 MMHG | BODY MASS INDEX: 40.52 KG/M2

## 2025-02-12 DIAGNOSIS — D3A.090 CARCINOID TUMOR OF RIGHT LUNG: Primary | ICD-10-CM

## 2025-02-12 PROCEDURE — 99213 OFFICE O/P EST LOW 20 MIN: CPT | Performed by: NURSE PRACTITIONER

## 2025-07-24 ENCOUNTER — HOSPITAL ENCOUNTER (OUTPATIENT)
Dept: CT IMAGING | Facility: HOSPITAL | Age: 60
Discharge: HOME | End: 2025-07-24
Admitting: INTERNAL MEDICINE
Payer: COMMERCIAL

## 2025-07-24 DIAGNOSIS — D3A.090 CARCINOID TUMOR OF RIGHT LUNG: ICD-10-CM

## 2025-07-24 PROCEDURE — 25510000001 IOPAMIDOL 61 % SOLUTION: Performed by: INTERNAL MEDICINE

## 2025-07-24 PROCEDURE — 71260 CT THORAX DX C+: CPT

## 2025-07-24 RX ORDER — IOPAMIDOL 612 MG/ML
100 INJECTION, SOLUTION INTRAVASCULAR
Status: COMPLETED | OUTPATIENT
Start: 2025-07-24 | End: 2025-07-24

## 2025-07-24 RX ADMIN — IOPAMIDOL 85 ML: 612 INJECTION, SOLUTION INTRAVENOUS at 15:07

## 2025-07-29 ENCOUNTER — OFFICE VISIT (OUTPATIENT)
Dept: ONCOLOGY | Facility: CLINIC | Age: 60
End: 2025-07-29
Payer: COMMERCIAL

## 2025-07-29 VITALS
HEART RATE: 103 BPM | OXYGEN SATURATION: 97 % | WEIGHT: 247 LBS | TEMPERATURE: 97.5 F | SYSTOLIC BLOOD PRESSURE: 146 MMHG | RESPIRATION RATE: 20 BRPM | HEIGHT: 65 IN | BODY MASS INDEX: 41.15 KG/M2 | DIASTOLIC BLOOD PRESSURE: 96 MMHG

## 2025-07-29 DIAGNOSIS — D3A.090 CARCINOID TUMOR OF RIGHT LUNG: Primary | ICD-10-CM

## 2025-07-29 PROCEDURE — 99214 OFFICE O/P EST MOD 30 MIN: CPT | Performed by: NURSE PRACTITIONER

## 2025-07-29 NOTE — PROGRESS NOTES
DATE OF VISIT: 7/29/2025    REASON FOR VISIT: Followup for lung cancer     PROBLEM LIST:  1.  Right upper lobe typical lung carcinoid, T1b N0 M0 stage I A2:  A.  Patient presented with growing nodule right lower lobe  B.  Diagnosed after bronchoscopy with biopsy done August 30, 2024 revealing well-differentiated regarding tumor  C.  Status post right upper lobe resection with lymph node sampling done by Dr. Rao October 1, 2024  D.  Final pathology revealed low-grade well-differentiated neuroendocrine tumor most consistent with typical carcinoid, 1.6 cm in size, clear margins and negative hilar as well as mediastinal nodes.  2.  Hypertension  3.  Hypothyroid    HISTORY OF PRESENT ILLNESS: The patient is a very pleasant 60 y.o. female  with past medical history significant for right lung carcinoid diagnosed August 2024.  Status post surgical resection by Dr. Rao October 2024. The  patient is here today for scheduled follow-up visit.    SUBJECTIVE: The patient is here today by herself. She has been doing fairly well. She still has some intermittent shortness of breath with exertion, but denies cough or chest pain. She does have issues with reflux related to her known hiatal hernia.    Past History:  Medical History: has a past medical history of Arthritis, Back pain, Cancer, Depression, Diabetes mellitus, Diarrhea, Diarrhea following gastrointestinal surgery, Disease of thyroid gland, Fatty liver (2005), Fatty liver, GERD (gastroesophageal reflux disease), Headache, Hiatal hernia, History of anemia, History of depression, History of migraine, Hypertension, IBS (irritable bowel syndrome), Insomnia, Murmur, Polycystic disease, ovaries, Presenile cataract, Scoliosis, Sleep apnea with use of continuous positive airway pressure (CPAP), and Wears glasses.   Surgical History: has a past surgical history that includes Colonoscopy (2017); Cholecystectomy (2014); Diagnostic laparoscopy (2007); Vagina surgery; [place] tooth  "extraction; d & c hysteroscopy endometrial ablation (N/A, 08/30/2017); d & c hysteroscopy myosure (N/A, 08/30/2017); Hernia repair (2014); total laparoscopic hysterectomy (N/A, 11/03/2017); Liver biopsy (2012); Umbilical hernia repair; Esophagogastroduodenoscopy; BRONCHOSCOPY WITH ION ROBOTIC ASSIST (N/A, 08/30/2024); Bronchoscopy (N/A, 10/1/2024); THORACOSCOPY WITH WEDGE RESECTION (Right, 10/1/2024); and Lymph node dissection (N/A, 10/1/2024).   Family History: family history includes Breast cancer (age of onset: 69) in her paternal aunt; Cirrhosis in her father; Colon cancer in her maternal uncle; Diabetes in her mother; Heart disease in her mother; Ovarian cancer (age of onset: 30) in her sister.   Social History: reports that she has never smoked. She has never been exposed to tobacco smoke. She has never used smokeless tobacco. She reports that she does not drink alcohol and does not use drugs.    (Not in a hospital admission)     Allergies: Codeine, Adhesive tape, and Chlorhexidine     Review of Systems   Respiratory:  Positive for shortness of breath.    Gastrointestinal:         Acid reflux       PHYSICAL EXAMINATION:   /96 Comment: LUE  Pulse 103   Temp 97.5 °F (36.4 °C) (Infrared)   Resp 20   Ht 165.1 cm (65\")   Wt 112 kg (247 lb)   LMP 10/26/2017   SpO2 97% Comment: RA  BMI 41.10 kg/m²    Pain Score    07/29/25 0951   PainSc: 0-No pain       ECOG score: 0        ECOG Performance Status: 0 - Asymptomatic      General Appearance:      alert, cooperative, no apparent distress and appears stated age   Lungs:   Clear to auscultation bilaterally; respirations regular, even, and unlabored bilaterally   Heart:  Regular rate and rhythm, no murmurs appreciated   Abdomen:   Soft, non-tender, non-distended, and no organomegaly                 No visits with results within 2 Week(s) from this visit.   Latest known visit with results is:   Admission on 09/30/2024, Discharged on 10/03/2024   Component Date " Value Ref Range Status    Product Code 10/01/2024 Q0054U36   Final    Unit Number 10/01/2024 [id number]   Final    UNIT  ABO 10/01/2024 A   Final    UNIT  RH 10/01/2024 POS   Final    Crossmatch Interpretation 10/01/2024 Compatible   Final    Dispense Status 10/01/2024 RE   Final    Blood Expiration Date 10/01/2024 559594859976   Final    Blood Type Barcode 10/01/2024 6200   Final    Product Code 10/01/2024 [id number]   Final    Unit Number 10/01/2024 [id number]   Final    UNIT  ABO 10/01/2024 A   Final    UNIT  RH 10/01/2024 POS   Final    Crossmatch Interpretation 10/01/2024 Compatible   Final    Dispense Status 10/01/2024 RE   Final    Blood Expiration Date 10/01/2024 990574490851   Final    Blood Type Barcode 10/01/2024 6200   Final    Glucose 09/30/2024 77  70 - 130 mg/dL Final    Product Code 10/03/2024 C8095R23   Final    Unit Number 10/03/2024 [id number]   Final    UNIT  ABO 10/03/2024 A   Final    UNIT  RH 10/03/2024 POS   Final    Crossmatch Interpretation 10/03/2024 Compatible   Final    Dispense Status 10/03/2024 RE   Final    Blood Expiration Date 10/03/2024 083318672959   Final    Blood Type Barcode 10/03/2024 6200   Final    ABO Type 10/01/2024 A   Final    RH type 10/01/2024 Positive   Final    Antibody Screen 10/01/2024 Negative   Final    T&S Expiration Date 10/01/2024 10/4/2024 11:59:59 PM   Final    WBC 10/01/2024 5.94  3.40 - 10.80 10*3/mm3 Final    RBC 10/01/2024 4.79  3.77 - 5.28 10*6/mm3 Final    Hemoglobin 10/01/2024 13.5  12.0 - 15.9 g/dL Final    Hematocrit 10/01/2024 41.7  34.0 - 46.6 % Final    MCV 10/01/2024 87.1  79.0 - 97.0 fL Final    MCH 10/01/2024 28.2  26.6 - 33.0 pg Final    MCHC 10/01/2024 32.4  31.5 - 35.7 g/dL Final    RDW 10/01/2024 14.1  12.3 - 15.4 % Final    RDW-SD 10/01/2024 45.1  37.0 - 54.0 fl Final    MPV 10/01/2024 9.7  6.0 - 12.0 fL Final    Platelets 10/01/2024 245  140 - 450 10*3/mm3 Final    Glucose 10/01/2024 84  65 - 99 mg/dL Final    BUN 10/01/2024  15  6 - 20 mg/dL Final    Creatinine 10/01/2024 0.90  0.57 - 1.00 mg/dL Final    Sodium 10/01/2024 141  136 - 145 mmol/L Final    Potassium 10/01/2024 4.2  3.5 - 5.2 mmol/L Final    Slight hemolysis detected by analyzer. Result may be falsely elevated.    Chloride 10/01/2024 105  98 - 107 mmol/L Final    CO2 10/01/2024 25.0  22.0 - 29.0 mmol/L Final    Calcium 10/01/2024 8.9  8.6 - 10.5 mg/dL Final    BUN/Creatinine Ratio 10/01/2024 16.7  7.0 - 25.0 Final    Anion Gap 10/01/2024 11.0  5.0 - 15.0 mmol/L Final    eGFR 10/01/2024 73.8  >60.0 mL/min/1.73 Final    Case Report 10/01/2024    Final                    Value:Surgical Pathology Report                         Case: [id number]                                  Authorizing Provider:  Christian Rao MD         Collected:           10/01/2024 05:41 PM          Ordering Location:     [facility]   Received:            10/02/2024 06:41 AM                                 OR                                                                           Pathologist:           Charles Pettit MD                                                            Intraop:               Charles Pettit MD                                                            Specimens:   1) - Lymph Node, STATION 4R FOR PERMANENT                                                           2) - Lymph Node, STATION 7 FOR PERMANENT                                                            3) - Lymph Node, STATION 8 FOR PERMANENT                                                            4) - Lymph Node, STATION 2R FOR PERMANENT                                                                                     5) - Lymph Node, Station 11                                                                         6) - Lung, Right Upper Lobe, RIGHT UPPER LOBE FOR FROZEN WITH MARGINS                      Clinical Information 10/01/2024    Final                    Value:Incidental lung nodule,  greater than or equal to 8mm    Final Diagnosis 10/01/2024    Final                    Value:LYMPH NODE, STATION 4R, BIOPSY:                          One benign lymph node (0/1).                          2.   LYMPH NODE, STATION 7, BIOPSY:                          One benign lymph node (0/1).                          3.   LYMPH NODES, STATION 8, BIOPSIES:                          Benign lymph node fragments                          4.   LYMPH NODES, STATION 2R, BIOPSIES:                          Benign lymph node fragments                          5.   LYMPH NODE, STATION 11, BIOPSY:                          One benign lymph node (0/1).                          6.   LUNG, RIGHT UPPER LOBECTOMY:                          Typical carcinoid/neuroendocrine tumor, grade 1                          One benign lymph node (0/1).                                                    LUNG CARCINOMA                          SPECIMEN TYPE/PROCEDURE: Lobectomy                          LATERALITY (Left or Right): Right                          TUMOR FOCALITY: Single focus                          TUMOR SITE: Upper lobe of lung                          TUMOR SIZE : 1.6 cm                          HISTOLOGIC TYPE: Typical carcinoid/neuroendocrine tumor, grade 1                          VISCERAL PLEURAL INVASION: Not identified                          DIRECT INVASION OF ADJACENT STRUCTURES: Not identified                          MARGIN: All margins negative for invasive carcinoma                          LYMPHVASCULAR INVASION: Not identified                          CLOSEST MARGIN (Specify): Parenchymal                          DISTANCE OF INVASIVE CARCINOMA FROM CLOSEST MARGIN: 0.8 cm                          REGIONAL LYMPH NODE STATUS: All regional lymph nodes negative for tumor                          NUMBER OF LYMPH NODES EXAMINED: At least 6                          ROSY STATIONS EXAMINED: 4R, 7, 8, 2R, 11                        "   TREATMENT EFFECT: No known presurgical therapy                          AJCC PATHOLOGIC STAGE:  (COMPLETED BY PATHOLOGIST, BASED ONLY ON TISSUE                           FINDINGS, MORE EXTENSIVE DISEASE MAY NOT BE KNOWN TO THE PATHOLOGIST)                          pT= 1b                          pN= 0                           pM= not applicable                          AJCC PATHOLOGIC STAGE:  IA2                          TISHA    Intraoperative Consultation 10/01/2024    Final                    Value:Frozen section: Verbal report given to Dr. Rao via speakerphone on                           10/1/2024 at 19:15 EDT.                                                    FROZEN SECTION DIAGNOSIS: Bronchovascular margin negative for malignancy.                            (TISHA)                          Stains used for Immediate Evaluation are acceptable.                                                                                                            Gross Description 10/01/2024    Final                    Value:1. Lymph Node.                          Received in formalin labeled \"station 4R for permanent\" is a tan-gray                           anthracotic possible lymph node measuring 1.0 x 0.4 x 0.3 cm.  The                           specimen is entirely as is in 1A.                                                    2. Lymph Node.                          Received in formalin labeled \"station 7 for permanent\" is a tan-gray                           possible lymph node measuring 3.1 x 1.4 x 0.7 cm.  The specimen is                           submitted entirely in 2A.                          3. Lymph Node.                          Received in formalin labeled \"station 8 for permanent\" is an aggregate of                           tan-gray possible anthracotic lymph nodes measuring 2.7 x 2.3 x 1.0 cm.                            The specimen is submitted entirely in 3A.                                          " "          4. Lymph Node.                          Received in formalin labeled \"station 2R for permanent\" is an aggregate of                           tan-gray fragmented lymph nodes measuring 3.4 x 3.6 x 1.8 cm.  Within the                           aggregate is a separate portion of tan-gray to tan-yellow lobulated soft                           tissue fragment containing possible matted lymph nodes measuring 3.3 x 3.2                           x 1.7 cm.  The specimen is submitted entirely as follows:                          4A loose aggregate of possible lymph nodes                          4B-4D matted possible lymph nodes, serially sectioned.                                                    5. Lymph Node.                          Received in formalin labeled \"station 11\" is a tan-gray possible lymph                           node measuring 1.8 x 1.1 x 0.5 cm.  Specimen is submitted entirely in 5A.                                                    6. Lung, Right Upper Lobe.                          Received fresh for frozen section diagnosis and labeled \"right upper lobe                           for frozen with margin\" is a 143.1 g lung lobectomy measuring 15.1 x 8.9 x                           3.8 cm.  The pleural surface is tan-purple and smooth.  The specimen                           contains a linear parenchymal margin measuring 12.1 cm.  The staple line                           is removed and the underlying tissue is inked black.  The specimen is                           serially section from superior to inferior to reveal a tan-white                           well-defined firm lesion measuring 1.6 x 1.2 x 1.1 cm, located 0.4 cm to                           the pleural surface, 0.8 cm to the parenchymal margin, and 2.2 cm to the                           closest bronchial and vascular margins.  The uninvolved lung parenchyma is                           red-brown and spongy.  1 possible lymph node " is grossly identified                           measuring 0.7 x 0.4 x 0.4 cm.  Representative sections of the specimen are                           submitted as follows:                          6A frozen section tissue, resubmitted, bronchial margins, en face                          6B remainder of vascular margins, en face                          6C-6F lesion submitted entirely from superior to inferior (6E-6F slice,                           bisected)                          6G1 possible lymph node, bisected. [place]                              Special Stains 10/01/2024    Final                    Value:Stains performed with adequate controls.  Erick performed on parts 1, 2,                           3, 4, 5 and, 6 support the diagnosis.                          AE1/AE3, CK8, and synaptophysin performed on part 4 support the diagnosis.                          5.  Tumor positive for CD56, synaptophysin, and chromogranin.  Ki-67                           proliferation rate less than 3%.  Tumor negative for high molecular weight                           keratin.    Microscopic Description 10/01/2024    Final                    Value:The slides are reviewed and demonstrate histopathologic features                           supporting the above rendered diagnosis.      Glucose 10/02/2024 190 (H)  65 - 99 mg/dL Final    BUN 10/02/2024 13  6 - 20 mg/dL Final    Creatinine 10/02/2024 0.88  0.57 - 1.00 mg/dL Final    Sodium 10/02/2024 138  136 - 145 mmol/L Final    Potassium 10/02/2024 3.8  3.5 - 5.2 mmol/L Final    Chloride 10/02/2024 104  98 - 107 mmol/L Final    CO2 10/02/2024 22.0  22.0 - 29.0 mmol/L Final    Calcium 10/02/2024 8.2 (L)  8.6 - 10.5 mg/dL Final    BUN/Creatinine Ratio 10/02/2024 14.8  7.0 - 25.0 Final    Anion Gap 10/02/2024 12.0  5.0 - 15.0 mmol/L Final    eGFR 10/02/2024 75.8  >60.0 mL/min/1.73 Final    WBC 10/02/2024 12.46 (H)  3.40 - 10.80 10*3/mm3 Final    RBC 10/02/2024 4.72  3.77 - 5.28  10*6/mm3 Final    Hemoglobin 10/02/2024 13.3  12.0 - 15.9 g/dL Final    Hematocrit 10/02/2024 40.1  34.0 - 46.6 % Final    MCV 10/02/2024 85.0  79.0 - 97.0 fL Final    MCH 10/02/2024 28.2  26.6 - 33.0 pg Final    MCHC 10/02/2024 33.2  31.5 - 35.7 g/dL Final    RDW 10/02/2024 13.7  12.3 - 15.4 % Final    RDW-SD 10/02/2024 43.1  37.0 - 54.0 fl Final    MPV 10/02/2024 9.2  6.0 - 12.0 fL Final    Platelets 10/02/2024 251  140 - 450 10*3/mm3 Final    Neutrophil % 10/02/2024 85.4 (H)  42.7 - 76.0 % Final    Lymphocyte % 10/02/2024 6.4 (L)  19.6 - 45.3 % Final    Monocyte % 10/02/2024 7.5  5.0 - 12.0 % Final    Eosinophil % 10/02/2024 0.1 (L)  0.3 - 6.2 % Final    Basophil % 10/02/2024 0.2  0.0 - 1.5 % Final    Immature Grans % 10/02/2024 0.4  0.0 - 0.5 % Final    Neutrophils, Absolute 10/02/2024 10.64 (H)  1.70 - 7.00 10*3/mm3 Final    Lymphocytes, Absolute 10/02/2024 0.80  0.70 - 3.10 10*3/mm3 Final    Monocytes, Absolute 10/02/2024 0.93 (H)  0.10 - 0.90 10*3/mm3 Final    Eosinophils, Absolute 10/02/2024 0.01  0.00 - 0.40 10*3/mm3 Final    Basophils, Absolute 10/02/2024 0.03  0.00 - 0.20 10*3/mm3 Final    Immature Grans, Absolute 10/02/2024 0.05  0.00 - 0.05 10*3/mm3 Final    nRBC 10/02/2024 0.0  0.0 - 0.2 /100 WBC Final    Glucose 10/02/2024 130  70 - 130 mg/dL Final    Glucose 10/02/2024 139 (H)  70 - 130 mg/dL Final    WBC 10/03/2024 10.84 (H)  3.40 - 10.80 10*3/mm3 Final    RBC 10/03/2024 4.69  3.77 - 5.28 10*6/mm3 Final    Hemoglobin 10/03/2024 13.2  12.0 - 15.9 g/dL Final    Hematocrit 10/03/2024 40.6  34.0 - 46.6 % Final    MCV 10/03/2024 86.6  79.0 - 97.0 fL Final    MCH 10/03/2024 28.1  26.6 - 33.0 pg Final    MCHC 10/03/2024 32.5  31.5 - 35.7 g/dL Final    RDW 10/03/2024 14.5  12.3 - 15.4 % Final    RDW-SD 10/03/2024 45.6  37.0 - 54.0 fl Final    MPV 10/03/2024 9.7  6.0 - 12.0 fL Final    Platelets 10/03/2024 258  140 - 450 10*3/mm3 Final    Glucose 10/03/2024 104 (H)  65 - 99 mg/dL Final    BUN 10/03/2024 12   6 - 20 mg/dL Final    Creatinine 10/03/2024 0.81  0.57 - 1.00 mg/dL Final    Sodium 10/03/2024 143  136 - 145 mmol/L Final    Potassium 10/03/2024 3.9  3.5 - 5.2 mmol/L Final    Slight hemolysis detected by analyzer. Result may be falsely elevated.    Chloride 10/03/2024 111 (H)  98 - 107 mmol/L Final    CO2 10/03/2024 23.0  22.0 - 29.0 mmol/L Final    Calcium 10/03/2024 8.1 (L)  8.6 - 10.5 mg/dL Final    BUN/Creatinine Ratio 10/03/2024 14.8  7.0 - 25.0 Final    Anion Gap 10/03/2024 9.0  5.0 - 15.0 mmol/L Final    eGFR 10/03/2024 83.7  >60.0 mL/min/1.73 Final    Glucose 10/03/2024 91  70 - 130 mg/dL Final    Glucose 10/03/2024 141 (H)  70 - 130 mg/dL Final        No results found.      ASSESSMENT: The patient is a very pleasant 60 y.o. female  with right lung carcinoid      PLAN:    1.  Right upper lobe lung carcinoid T1b N0 M0 stage IA2:  A.  I reviewed the CAT scan results from 7/24/25 with the patient. I reviewed the images myself. The official radiology report is not yet available, however I do not appreciate any evidence of new or progressive disease. We will follow up on the official report and notify her of any significant findings.   B.  I will continue watchful waiting with plan to repeat her CAT scan in 6 months. This will be ordered for prior to return.     2.  Hypothyroid:  A patient will continue Synthroid daily.    3.  Hypertension:  A.  She will continue lisinopril daily.    4. Acid reflux:  A. She is taking omeprazole daily. She is going to get back in to there PCP to discuss other options. We also discussed she may need to consider repeat EGD if her symptoms are worsening.     FOLLOW UP: 6 months with repeating CT scan.    Kathy Keller, APRN  7/29/2025

## (undated) DEVICE — OASIS DRAIN, SINGLE, INLINE & ATS COMPATIBLE: Brand: OASIS

## (undated) DEVICE — ADAPT ST INFUS ADMIN SYR 70IN

## (undated) DEVICE — GLV SURG SENSICARE MICRO PF LF 6.5 STRL

## (undated) DEVICE — DRP ADAPT ALLY UTER POSTN SYS 1P/U

## (undated) DEVICE — PROB ABL ENDOMTRL NOVASURE/G3 IMPEDENCE 1P/U

## (undated) DEVICE — ANCHOR TISSUE RETRIEVAL SYSTEM, BAG SIZE 125 ML, PORT SIZE 8 MM: Brand: ANCHOR TISSUE RETRIEVAL SYSTEM

## (undated) DEVICE — TUBING, SUCTION, 1/4" X 10', STRAIGHT: Brand: MEDLINE

## (undated) DEVICE — MANIP UTER RUMI TP 6.7MMX8CM BLU

## (undated) DEVICE — LEX D AND C: Brand: MEDLINE INDUSTRIES, INC.

## (undated) DEVICE — HYPODERMIC SAFETY NEEDLE: Brand: MONOJECT

## (undated) DEVICE — AIRWY 90MM NO9

## (undated) DEVICE — MANIP UTER RUMI 2 KOH EFFICIENT 3CM BL

## (undated) DEVICE — FLTR PLUMEPORT LAP W/CONN STRL

## (undated) DEVICE — SUT VIC 2/0 CT2 27IN J269H

## (undated) DEVICE — NDL PULM SUPERDIMENSION ARCPOINT 18G

## (undated) DEVICE — ADHS SKIN PREMIERPRO EXOFIN TOPICAL HI/VISC .5ML

## (undated) DEVICE — TRAP SPECI MUCUS 40CC LF STRL

## (undated) DEVICE — SHEET, DRAPE, SPLIT, STERILE: Brand: MEDLINE

## (undated) DEVICE — GLV SURG SENSICARE MICRO PF LF 8.5 STRL

## (undated) DEVICE — VISION PROBE ADAPTER AND SUCTION ADAPTER

## (undated) DEVICE — SOL IRR NACL 0.9PCT 1000ML

## (undated) DEVICE — ANTIBACTERIAL UNDYED BRAIDED (POLYGLACTIN 910), SYNTHETIC ABSORBABLE SURGICAL SUTURE: Brand: COATED VICRYL

## (undated) DEVICE — BLAKE CARDIO CONNECTOR 1:1: Brand: J-VAC

## (undated) DEVICE — ENDOPATH 5MM ENDOSCOPIC BLUNT TIP DISSECTORS (12 POUCHES CONTAINING 3 DISSECTORS EACH): Brand: ENDOPATH

## (undated) DEVICE — ANTIBACTERIAL UNDYED BRAIDED (POLYGLACTIN 910), SYNTHETIC ABSORBABLE SUTURE: Brand: COATED VICRYL

## (undated) DEVICE — APPL CHLORAPREP W/TINT 26ML BLU

## (undated) DEVICE — DRN WND CH RND FUL/FLUT NO/TROC 3/8IN 28F

## (undated) DEVICE — SUT MNCRYL 3/0 SH 27IN UD MCP416H

## (undated) DEVICE — GLV SURG SIGNATURE TOUCH PF LTX 7 STRL

## (undated) DEVICE — GLV SURG SENSICARE MICRO PF LF 8 STRL

## (undated) DEVICE — TOTAL TRAY, 16FR 10ML SIL FOLEY, URN: Brand: MEDLINE

## (undated) DEVICE — MEDI-VAC YANKAUER SUCTION HANDLE W/BULBOUS TIP: Brand: CARDINAL HEALTH

## (undated) DEVICE — TIP COVER ACCESSORY

## (undated) DEVICE — Device

## (undated) DEVICE — SOL LR 1000ML

## (undated) DEVICE — LAPAROVUE VISIBILITY SYSTEM LAPAROSCOPIC SOLUTIONS: Brand: LAPAROVUE

## (undated) DEVICE — VLV SXN BRONCH DISP FOR FLEX ENDOSCOPE

## (undated) DEVICE — BIOPSY NEEDLE, 19G: Brand: FLEXISION

## (undated) DEVICE — REDUCER: Brand: ENDOWRIST

## (undated) DEVICE — GLV SURG SENSICARE W/ALOE PF LF 9 STRL

## (undated) DEVICE — PK THORACOTOMY 10

## (undated) DEVICE — TISSUE RETRIEVAL SYSTEM: Brand: INZII RETRIEVAL SYSTEM

## (undated) DEVICE — APPL CHLORAPREP TINTED 26ML TEAL

## (undated) DEVICE — PATIENT RETURN ELECTRODE, SINGLE-USE, CONTACT QUALITY MONITORING, ADULT, WITH 9FT CORD, FOR PATIENTS WEIGING OVER 33LBS. (15KG): Brand: MEGADYNE

## (undated) DEVICE — SYR SLP TP 10ML DISP

## (undated) DEVICE — DEFOGGER!" ANTI FOG KIT: Brand: DEROYAL

## (undated) DEVICE — TROC BLADLES ANCHORPORT/OPTI LP 12X120MM 1P/U

## (undated) DEVICE — SENSR O2 OXIMAX FNGR A/ 18IN NONSTR

## (undated) DEVICE — DEV TISS REMOV MYOSURE REACH

## (undated) DEVICE — SYR CONTRL LUERLOK 10CC

## (undated) DEVICE — GOWN,NON-REINFORCED,SIRUS,SET IN SLV,XXL: Brand: MEDLINE

## (undated) DEVICE — BIOPSY NEEDLE, 21G: Brand: FLEXISION

## (undated) DEVICE — SAFESECURE,SECUREMENT,FOLEY CATH,STERILE: Brand: MEDLINE

## (undated) DEVICE — SWIVEL CONNECTOR

## (undated) DEVICE — GLV SURG GRN DERMASSURE LF PF 7.5

## (undated) DEVICE — SYR SLPTP 20CC

## (undated) DEVICE — SPNG GZ WOVN 4X4IN 12PLY 10/BX STRL

## (undated) DEVICE — [HIGH FLOW INSUFFLATOR,  DO NOT USE IF PACKAGE IS DAMAGED,  KEEP DRY,  KEEP AWAY FROM SUNLIGHT,  PROTECT FROM HEAT AND RADIOACTIVE SOURCES.]: Brand: PNEUMOSURE

## (undated) DEVICE — DRAPE,TOP,102X53,STERILE: Brand: MEDLINE

## (undated) DEVICE — COLUMN DRAPE

## (undated) DEVICE — SPNG LAP CIGARETTE KITTNER 5MM STRL PK/5

## (undated) DEVICE — ARM DRAPE

## (undated) DEVICE — MEDI-VAC NON-CONDUCTIVE SUCTION TUBING: Brand: CARDINAL HEALTH

## (undated) DEVICE — CATHETER: Brand: ION

## (undated) DEVICE — 2, DISPOSABLE SUCTION/IRRIGATOR WITHOUT DISPOSABLE TIP: Brand: STRYKEFLOW

## (undated) DEVICE — ANCHOR TISSUE RETRIEVAL SYSTEM, BAG SIZE 1200 ML, PORT SIZE 15 MM: Brand: ANCHOR TISSUE RETRIEVAL SYSTEM

## (undated) DEVICE — SUCTION CANISTER, 2500CC, RIGID: Brand: DEROYAL

## (undated) DEVICE — INTENDED FOR TISSUE SEPARATION, AND OTHER PROCEDURES THAT REQUIRE A SHARP SURGICAL BLADE TO PUNCTURE OR CUT.: Brand: BARD-PARKER ® STAINLESS STEEL BLADES

## (undated) DEVICE — GLV SURG BIOGELULTRATOUCH POLYISPRN PF LF SZ7 STRL

## (undated) DEVICE — GLV SURG SENSICARE MICRO PF LF 6 STRL

## (undated) DEVICE — ENDOPATH XCEL BLADELESS TROCARS WITH STABILITY SLEEVES: Brand: ENDOPATH XCEL

## (undated) DEVICE — CLTH CLENS READYCLEANSE PERI CARE PK/5

## (undated) DEVICE — CATHETER GUIDE

## (undated) DEVICE — CYSTO/BLADDER IRRIGATION SET, REGULATING CLAMP

## (undated) DEVICE — PAD,ARMBOARD,CONV,FOAM,2X8X20",12PR/CS: Brand: MEDLINE

## (undated) DEVICE — FRCP BX RADJAW4 PULM WO NDL STD1.8X2 100

## (undated) DEVICE — CATHETER,URETHRAL,REDRUBBER,STRL,14FR: Brand: MEDLINE INDUSTRIES, INC.

## (undated) DEVICE — Device: Brand: ION

## (undated) DEVICE — GLV SURG DERMASSURE GRN LF PF SZ 6.5

## (undated) DEVICE — SUREFORM 45 CURVED-TIP: Brand: SUREFORM

## (undated) DEVICE — SINGLE USE SUCTION VALVE MAJ-209: Brand: SINGLE USE SUCTION VALVE (STERILE)

## (undated) DEVICE — ST TBG AIRSEAL FLTR TRI LUM

## (undated) DEVICE — BOWL UTIL STRL 32OZ

## (undated) DEVICE — SKIN AFFIX SURG ADHESIVE 72/CS 0.55ML: Brand: MEDLINE

## (undated) DEVICE — CANNULA,ADULT,SOFT-TOUCH,7TUBE,SC: Brand: MEDLINE

## (undated) DEVICE — VISION PROBE: Brand: ION

## (undated) DEVICE — PK MAJ GYN DAVINCI 10

## (undated) DEVICE — BRUSH CYTO BRONCOSCOPE

## (undated) DEVICE — SUT MNCRYL PLS ANTIB UD 3/0 PS2 27IN

## (undated) DEVICE — DUAL LUMEN STOMACH TUBE,ANTI-REFLUX VALVE: Brand: SALEM SUMP

## (undated) DEVICE — OBT BLADLES ENDOWRIST DAVINCI/S 8MM

## (undated) DEVICE — SEAL

## (undated) DEVICE — GOWN,SIRUS,NON REINFRCD XL XLONG: Brand: MEDLINE

## (undated) DEVICE — BLADELESS OBTURATOR: Brand: WECK VISTA

## (undated) DEVICE — NDL BLNT 18G 1 1/2IN

## (undated) DEVICE — SOL ANTISTICK CAUTRY ELECTROLUBE LF